# Patient Record
Sex: FEMALE | Race: BLACK OR AFRICAN AMERICAN | Employment: FULL TIME | URBAN - METROPOLITAN AREA
[De-identification: names, ages, dates, MRNs, and addresses within clinical notes are randomized per-mention and may not be internally consistent; named-entity substitution may affect disease eponyms.]

---

## 2017-02-21 ENCOUNTER — TELEPHONE (OUTPATIENT)
Dept: FAMILY MEDICINE CLINIC | Age: 27
End: 2017-02-21

## 2017-02-21 NOTE — TELEPHONE ENCOUNTER
----- Message from Yash Jordan sent at 2/21/2017 10:50 AM EST -----  Regarding: Dr. Adriana Alfaro  H/C [phone]    Pt would like a call back with an appt for CPE and f/up Vitamin D with only Dr. Vanessa Mcfarland.

## 2017-02-21 NOTE — TELEPHONE ENCOUNTER
Returned call. Appt made and also appt tickler for April schedule of Dr. Urmila Baltazar.     Friday, March 3, 2017 04:15 PM f [facility]-MAIN OFFICE, [facility], Routine Care, 15min    f/up Vitamin D

## 2017-03-03 ENCOUNTER — OFFICE VISIT (OUTPATIENT)
Dept: FAMILY MEDICINE CLINIC | Age: 27
End: 2017-03-03

## 2017-03-03 VITALS
HEART RATE: 80 BPM | WEIGHT: 140 LBS | BODY MASS INDEX: 24.8 KG/M2 | DIASTOLIC BLOOD PRESSURE: 85 MMHG | SYSTOLIC BLOOD PRESSURE: 140 MMHG | RESPIRATION RATE: 16 BRPM | HEIGHT: 63 IN | OXYGEN SATURATION: 100 % | TEMPERATURE: 98.5 F

## 2017-03-03 DIAGNOSIS — E55.9 VITAMIN D DEFICIENCY: Primary | ICD-10-CM

## 2017-03-03 NOTE — PROGRESS NOTES
Chief Complaint   Patient presents with    Vitamin D Deficiency     follow up     1. Have you been to the ER, urgent care clinic since your last visit? Hospitalized since your last visit? No    2. Have you seen or consulted any other health care providers outside of the [address] [place] [address] since your last visit? Include any pap smears or colon screening.  No

## 2017-03-03 NOTE — PATIENT INSTRUCTIONS
Vitamin D (By mouth)   Vitamin D (VYE-ta-min D)  Maintains calcium and phosphorus in your body and makes your bones strong. This medicine is a vitamin. Brand Name(s):Verdugo Vitamin D, Verdugo Vitamin D3, Delta D3, Dialyvite Vitamin D3 Max, Drisdol, Leader Vitamin D3, Francheska Dutta , Ang Natural Vitamin D, Nature's Blend Super Strength Vitamin D3, Nature's Blend Vitamin D, Nature's Blend Vitamin D3, PharmAssure Vitamin D-3, R[employer] Aid Vitamin D-3, [place] Naturals Vitamin D3, Thera-D 2000   There may be other brand names for this medicine. When This Medicine Should Not Be Used: You should not use this medicine if you have had an allergic reaction to vitamin D. How to Use This Medicine:   Tablet, Liquid Filled Capsule  · Your doctor will tell you how much medicine to use. Do not use more than directed. · Follow the instructions on the medicine label if you are using this medicine without a prescription. · It is best to take this medicine with food or milk. · Carefully follow your doctor's instructions about any special diet. If a dose is missed:   · Take a dose as soon as you remember. If it is almost time for your next dose, wait until then and take a regular dose. Do not take extra medicine to make up for a missed dose. How to Store and Dispose of This Medicine:   · Store the medicine in a closed container at room temperature, away from heat, moisture, and direct light. · Ask your pharmacist, doctor, or health caregiver about the best way to dispose of any outdated medicine or medicine no longer needed. · Keep all medicine out of the reach of children. Never share your medicine with anyone. Drugs and Foods to Avoid:   Ask your doctor or pharmacist before using any other medicine, including over-the-counter medicines, vitamins, and herbal products. · Make sure your doctor knows about all other medicines you are using.   Warnings While Using This Medicine:   · Make sure your doctor knows if you are pregnant or breast feeding. · Make sure your doctor knows if you have kidney stones, sarcoidosis, or overactive parathyroid gland. · You should not use this medicine if you are under 25years of age. Possible Side Effects While Using This Medicine:   Call your doctor right away if you notice any of these side effects:  · Allergic reaction: Itching or hives, swelling in your face or hands, swelling or tingling in your mouth or throat, chest tightness, trouble breathing  · Change in how much or how often you urinate. If you notice these less serious side effects, talk with your doctor:   · Diarrhea. · Headache. · Weight loss. If you notice other side effects that you think are caused by this medicine, tell your doctor. Call your doctor for medical advice about side effects. You may report side effects to FDA at 2-028-FDA-1139  © 2016 [address] is for End User's use only and may not be sold, redistributed or otherwise used for commercial purposes. The above information is an  only. It is not intended as medical advice for individual conditions or treatments. Talk to your doctor, nurse or pharmacist before following any medical regimen to see if it is safe and effective for you. Learning About Vitamin D  Why is it important to get enough vitamin D? Your body needs vitamin D to absorb calcium. Calcium keeps your bones and muscles, including your heart, healthy and strong. If your muscles don't get enough calcium, they can cramp, hurt, or feel weak. You may have long-term (chronic) muscle aches and pains. If you don't get enough vitamin D throughout life, you have an increased chance of having thin and brittle bones (osteoporosis) in your later years. Children who don't get enough vitamin D may not grow as much as others their age. They also have a chance of getting a rare disease called rickets. It causes weak bones.   Vitamin D and calcium are added to many foods. And your body uses sunshine to make its own vitamin D. How much vitamin D do you need? The [place] of [employer] recommends that people ages 3 through 79 get 600 IU (international units) every day. Adults 71 and older need 800 IU every day. Blood tests for vitamin D can check your vitamin D level. But there is no standard normal range used by all laboratories. The [place] of [employer] recommends a blood level of 20 ng/mL of vitamin D for healthy bones. And most people in the [place] and [facility]) meet this goal.  How can you get more vitamin D? Foods that contain vitamin D include:  · [place], tuna, and mackerel. These are some of the best foods to eat when you need to get more vitamin D.  · Cheese, egg yolks, and beef liver. These foods have vitamin D in small amounts. · Milk, soy drinks, orange juice, yogurt, margarine, and some kinds of cereal have vitamin D added to them. Some people don't make vitamin D as well as others. They may have to take extra care in getting enough vitamin D. Things that reduce how much vitamin D your body makes include:  · Dark skin, such as many  Americans have. · Age, especially if you are older than 72. · Digestive problems, such as Crohn's or celiac disease. · Liver and kidney disease. Some people who do not get enough vitamin D may need supplements. Are there any risks from taking vitamin D?  · Too much vitamin D:  ¨ Can damage your kidneys. ¨ Can cause nausea and vomiting, constipation, and weakness. ¨ Raises the amount of calcium in your blood. If this happens, you can get confused or have an irregular heart rhythm. · Vitamin D may interact with other medicines. Tell your doctor about all of the medicines you take, including over-the-counter drugs, herbs, and pills. Tell your doctor about all of your current medical problems. Where can you learn more? Go to http://marilee-greta.info/.   Enter [id number] in the search box to learn more about \"Learning About Vitamin D.\"  Current as of: July 26, 2016  Content Version: 11.1  © 1844-1850 [employer]. Care instructions adapted under license by [employer] (which disclaims liability or warranty for this information). If you have questions about a medical condition or this instruction, always ask your healthcare professional. [place] [address] any warranty or liability for your use of this information.

## 2017-03-04 LAB — 25(OH)D3+25(OH)D2 SERPL-MCNC: 44.6 NG/ML (ref 30–100)

## 2017-04-03 ENCOUNTER — OFFICE VISIT (OUTPATIENT)
Dept: FAMILY MEDICINE CLINIC | Age: 27
End: 2017-04-03

## 2017-04-03 ENCOUNTER — HOSPITAL ENCOUNTER (OUTPATIENT)
Dept: LAB | Age: 27
Discharge: HOME | End: 2017-04-03
Payer: COMMERCIAL

## 2017-04-03 VITALS
OXYGEN SATURATION: 100 % | DIASTOLIC BLOOD PRESSURE: 81 MMHG | RESPIRATION RATE: 17 BRPM | BODY MASS INDEX: 25.16 KG/M2 | SYSTOLIC BLOOD PRESSURE: 137 MMHG | HEART RATE: 77 BPM | WEIGHT: 142 LBS | HEIGHT: 63 IN | TEMPERATURE: 98.4 F

## 2017-04-03 DIAGNOSIS — Z11.3 SCREENING FOR STD (SEXUALLY TRANSMITTED DISEASE): ICD-10-CM

## 2017-04-03 DIAGNOSIS — Z01.419 WELL WOMAN EXAM WITH ROUTINE GYNECOLOGICAL EXAM: Primary | ICD-10-CM

## 2017-04-03 PROCEDURE — 88175 CYTOPATH C/V AUTO FLUID REDO: CPT | Performed by: FAMILY MEDICINE

## 2017-04-03 NOTE — PROGRESS NOTES
Subjective:   27 y.o. female for Well Woman Check. No LMP recorded. Patient has had an implant. implanon due to be removed in Dec.   Currently with irregular menses. She also notes some breast tenderness. Unsure of contraception. She is currently in a monogamous relationship. Considering future pregnancy. Social History: single partner, contraception - Implanon. Pertinent past medical hstory: no history of HTN, DVT, CAD, DM, liver disease, migraines or smoking. Patient Active Problem List   Diagnosis Code    Vitamin D deficiency E55.9    IM (infectious mononucleosis) B27.90     Patient Active Problem List    Diagnosis Date Noted    IM (infectious mononucleosis) 01/17/2013    Vitamin D deficiency 01/09/2013     Current Outpatient Prescriptions   Medication Sig Dispense Refill    cholecalciferol (VITAMIN D3) 1,000 unit tablet Take 1 Tab by mouth daily. 90 Tab 4     Allergies   Allergen Reactions    Sulfa (Sulfonamide Antibiotics) Hives     Past Medical History:   Diagnosis Date    Headache      Past Surgical History:   Procedure Laterality Date    HX WISDOM TEETH EXTRACTION  08/14/2015     Social History   Substance Use Topics    Smoking status: Never Smoker    Smokeless tobacco: Never Used    Alcohol use Yes      Comment: occasionally        ROS:  Feeling well. No dyspnea or chest pain on exertion. No abdominal pain, change in bowel habits, black or bloody stools. No urinary tract symptoms. GYN ROS: no breast pain or new or enlarging lumps on self exam. No neurological complaints. Objective:     Visit Vitals    /81    Pulse 77    Temp 98.4 °F (36.9 °C) (Oral)    Resp 17    Ht 5' 3\" (1.6 m)    Wt 142 lb (64.4 kg)    SpO2 100%    BMI 25.15 kg/m2     The patient appears well, alert, oriented x 3, in no distress. ENT normal.  Neck supple. No adenopathy or thyromegaly. CURT. Lungs are clear, good air entry, no wheezes, rhonchi or rales.  S1 and S2 normal, no murmurs, regular rate and rhythm. Abdomen soft without tenderness, guarding, mass or organomegaly. Extremities show no edema, normal peripheral pulses. Neurological is normal, no focal findings. BREAST EXAM: not examined    PELVIC EXAM: normal external genitalia, vulva, vagina, cervix, uterus and adnexa, PAP: Pap smear done today, exam chaperoned by LPN, scant bleeding present at the cervical os. Assessment/Plan:   well woman  no contraindication to continue hormonal therapy  pap smear  additional lab tests per orders  return annually or prn    ICD-10-CM ICD-9-CM    1. Well woman exam with routine gynecological exam Z01.419 V72.31 CBC W/O DIFF      LIPID PANEL      METABOLIC PANEL, COMPREHENSIVE      TSH 3RD GENERATION      PAP, IG, RFX HPV ASCUS ([id number])   .

## 2017-04-03 NOTE — PROGRESS NOTES
Chief Complaint   Patient presents with    Well Woman     w/pap     1. Have you been to the ER, urgent care clinic since your last visit? Hospitalized since your last visit? No    2. Have you seen or consulted any other health care providers outside of the [address] [place] [address] since your last visit? Include any pap smears or colon screening.  No

## 2017-04-03 NOTE — PATIENT INSTRUCTIONS
Well Visit, Ages 25 to 48: Care Instructions  Your Care Instructions  Physical exams can help you stay healthy. Your doctor has checked your overall health and may have suggested ways to take good care of yourself. He or she also may have recommended tests. At home, you can help prevent illness with healthy eating, regular exercise, and other steps. Follow-up care is a key part of your treatment and safety. Be sure to make and go to all appointments, and call your doctor if you are having problems. It's also a good idea to know your test results and keep a list of the medicines you take. How can you care for yourself at home? · Reach and stay at a healthy weight. This will lower your risk for many problems, such as obesity, diabetes, heart disease, and high blood pressure. · Get at least 30 minutes of physical activity on most days of the week. Walking is a good choice. You also may want to do other activities, such as running, swimming, cycling, or playing tennis or team sports. Discuss any changes in your exercise program with your doctor. · Do not smoke or allow others to smoke around you. If you need help quitting, talk to your doctor about stop-smoking programs and medicines. These can increase your chances of quitting for good. · Talk to your doctor about whether you have any risk factors for sexually transmitted infections (STIs). Having one sex partner (who does not have STIs and does not have sex with anyone else) is a good way to avoid these infections. · Use birth control if you do not want to have children at this time. Talk with your doctor about the choices available and what might be best for you. · Protect your skin from too much sun. When you're outdoors from 10 a.m. to 4 p.m., stay in the shade or cover up with clothing and a hat with a wide brim. Wear sunglasses that block UV rays. Even when it's cloudy, put broad-spectrum sunscreen (SPF 30 or higher) on any exposed skin.   · See a dentist one or two times a year for checkups and to have your teeth cleaned. · Wear a seat belt in the car. · Drink alcohol in moderation, if at all. That means no more than 2 drinks a day for men and 1 drink a day for women. Follow your doctor's advice about when to have certain tests. These tests can spot problems early. For everyone  · Cholesterol. Have the fat (cholesterol) in your blood tested after age 21. Your doctor will tell you how often to have this done based on your age, family history, or other things that can increase your risk for heart disease. · Blood pressure. Have your blood pressure checked during a routine doctor visit. Your doctor will tell you how often to check your blood pressure based on your age, your blood pressure results, and other factors. · Vision. Talk with your doctor about how often to have a glaucoma test.  · Diabetes. Ask your doctor whether you should have tests for diabetes. · Colon cancer. Have a test for colon cancer at age 48. You may have one of several tests. If you are younger than 48, you may need a test earlier if you have any risk factors. Risk factors include whether you already had a precancerous polyp removed from your colon or whether your parent, brother, sister, or child has had colon cancer. For women  · Breast exam and mammogram. Talk to your doctor about when you should have a clinical breast exam and a mammogram. Medical experts differ on whether and how often women under 50 should have these tests. Your doctor can help you decide what is right for you. · Pap test and pelvic exam. Begin Pap tests at age 24. A Pap test is the best way to find cervical cancer. The test often is part of a pelvic exam. Ask how often to have this test.  · Tests for sexually transmitted infections (STIs). Ask whether you should have tests for STIs. You may be at risk if you have sex with more than one person, especially if your partners do not wear condoms.   For men  · Tests for sexually transmitted infections (STIs). Ask whether you should have tests for STIs. You may be at risk if you have sex with more than one person, especially if you do not wear a condom. · Testicular cancer exam. Ask your doctor whether you should check your testicles regularly. · Prostate exam. Talk to your doctor about whether you should have a blood test (called a PSA test) for prostate cancer. Experts differ on whether and when men should have this test. Some experts suggest it if you are older than 39 and are -American or have a father or brother who got prostate cancer when he was younger than 72. When should you call for help? Watch closely for changes in your health, and be sure to contact your doctor if you have any problems or symptoms that concern you. Where can you learn more? Go to http://marilee-greta.info/. Enter P072 in the search box to learn more about \"Well Visit, Ages 25 to 48: Care Instructions. \"  Current as of: July 19, 2016  Content Version: 11.2  © 8056-1190 [employer] Care instructions adapted under license by [employer] (which disclaims liability or warranty for this information). If you have questions about a medical condition or this instruction, always ask your healthcare professional. [place] [address] any warranty or liability for your use of this information. Learning About Natural Family Planning  What is natural family planning? Natural family planning is a way to find out which days of the month you are most likely to get pregnant. To prevent pregnancy, you do not have sex on those days. If you want to get pregnant, you have sex during those days. But a woman may use natural family planning and still not get the results she wants. This method may not work well for you if your periods are not regular.  It also may not work well if you have problems keeping track of your periods or taking your temperature at the same time each day. How well does it work as birth control? Family planning takes a lot of effort. You must be very aware of your body. And you must track many things closely. It can be very hard to do \"exactly as directed. \" This type of family planning does not work better than other birth control methods. In the first year of use:  · When natural family planning is used exactly as directed, 5 women out of 100 have an unplanned pregnancy. · When it is not used exactly as directed, 24 women out of 100 have an unplanned pregnancy. How do you find out when you are likely to get pregnant? A woman who has regular periods has about 5 to 9 fertile days each month. These are the days when she can get pregnant. To find out when you are fertile, you must know when you release an egg (ovulate). There are several ways to find out when you are fertile. To get the result you want, you may need to use some of these methods at the same time. You should check your body changes using these methods for several months before you use them to avoid pregnancy. · In the calendar, or rhythm method, you write down when you start your period. This tells you how long your cycle is. It also tells you how regular it is. With this information, you can guess which days of the month you are most likely to be fertile. This is between 9 and 17 days before your next period. This method works best if you have regular cycles. · In the basal body temperature (BBT) method, you take your temperature first thing in the morning every day. This gives you your BBT. This is your lowest temperature during the day. Your BBT goes down 1 to 2 days before ovulation. Then it goes back up 1 to 2 days after you ovulate. If you use care to track your BBT, you may be able to guess when you are fertile. · In the cervical mucus (Angus) method, you check the mucus in your vagina every day.  You write down the amount, stickiness, and color of the mucus. The mucus changes during your menstrual cycle. If you track it, you may be able to guess when you are fertile. · In hormonal monitoring, you buy a kit that lets you check the amount of a hormone in your urine. The amount of the hormone tells you if you may be ovulating. · In the combined (symptothermal) method, you use your basal body temperature, changes in your cervical mucus, and a hormone test to guess when you ovulate. You also watch for signs of ovulation. These include tender breasts, belly pain, and mood changes. Be sure to tell your doctor about any health problems you have or medicines you take. He or she can help you choose the birth control method that is right for you. What are the advantages of natural family planning? · It may fit well with your Roman Catholic or personal beliefs about birth control. · You are able to use a \"natural\" method of birth control. It doesn't use medicines, surgery, or other devices. · You are aware of your cycle and the changes it causes in your body. What are the disadvantages of natural family planning? · It does not always work very well. · It doesn't protect against sexually transmitted infections (STIs), such as herpes or HIV. If you're not sure if your sex partner might have an STI, use a condom to protect against disease. · It may not work well when you have a lot of stress, have just had a baby, or stop taking birth control pills. It also may not work well just before menopause. · You must pay attention to body changes and record all details. · You can't use the method until you've tracked body changes for a few months. Where can you learn more? Go to http://marilee-greta.info/. Enter A452 in the search box to learn more about \"Learning About [facility]. \"  Current as of: May 30, 2016  Content Version: 11.2  © 8328-6543 [employer].  Care instructions adapted under license by [employer] (which disclaims liability or warranty for this information). If you have questions about a medical condition or this instruction, always ask your healthcare professional. [address] any warranty or liability for your use of this information.

## 2017-04-03 NOTE — MR AVS SNAPSHOT
Visit Information Date & Time Provider Department Dept. Phone Encounter #  
 4/3/2017  9:00 AM Felipa Potter, [facility] [phone] [id number] Follow-up Instructions Return in about 1 year (around 4/3/2018), or if symptoms worsen or fail to improve. Upcoming Health Maintenance Date Due  
 PAP AKA CERVICAL CYTOLOGY 7/16/2016 DTaP/Tdap/Td series (6 - Td) 6/27/2022 Allergies as of 4/3/2017  Review Complete On: 4/3/2017 By: Felipa Potter MD  
  
 Severity Noted Reaction Type Reactions Sulfa (Sulfonamide Antibiotics)  07/09/2012    Hives Current Immunizations  Reviewed on 12/15/2015 Name Date DTAP Vaccine 9/13/1992, 3/28/1990, [birth year] HPV (Quad) 6/6/2014, 8/19/2013 Hep A Vaccine (Adult) 6/6/2014, 8/19/2013 Hepatitis B Vaccine 1/17/2013, 11/29/2012, 6/27/2012 IPV 9/13/1992, 3/28/1990, 3/8/1989, 1990, 1990 Influenza Vaccine 10/3/2016, 10/12/2013, 1/8/2013 MMR Vaccine 9/13/1992, 11/30/1989 TB Skin Test (PPD) Intradermal 12/15/2015 11:59 AM, 6/19/2013 11:33 AM  
 TDAP Vaccine 6/27/2012 Not reviewed this visit You Were Diagnosed With   
  
 Codes Comments Well woman exam with routine gynecological exam    -  Primary ICD-10-CM: X01.617 ICD-9-CM: V72.31 Screening for STD (sexually transmitted disease)     ICD-10-CM: Z11.3 ICD-9-CM: V74.5 Vitals BP Pulse Temp Resp Height(growth percentile) Weight(growth percentile) 137/81 77 98.4 °F (36.9 °C) (Oral) 17 5' 3\" (1.6 m) 142 lb (64.4 kg) SpO2 BMI OB Status Smoking Status 100% 25.15 kg/m2 Implant Never Smoker BMI and BSA Data Body Mass Index Body Surface Area  
 25.15 kg/m 2 1.69 m 2 Preferred Pharmacy Pharmacy Name Phone [facility] [address] [facility] [place] [phone] Your Updated Medication List  
  
   
 This list is accurate as of: 4/3/17  9:36 AM.  Always use your most recent med list.  
  
  
  
  
 cholecalciferol 1,000 unit tablet Commonly known as:  VITAMIN D3 Take 1 Tab by mouth daily. We Performed the Following CBC W/O DIFF [09850 CPT(R)] LIPID PANEL [34519 CPT(R)] METABOLIC PANEL, COMPREHENSIVE [66563 CPT(R)] [address] [id number] Custom] PAP, IG, RFX HPV ASCUS (654020) [05288 CPT(R)] TSH 3RD GENERATION [45268 CPT(R)] Follow-up Instructions Return in about 1 year (around 4/3/2018), or if symptoms worsen or fail to improve. Patient Instructions Well Visit, Ages 25 to 48: Care Instructions Your Care Instructions Physical exams can help you stay healthy. Your doctor has checked your overall health and may have suggested ways to take good care of yourself. He or she also may have recommended tests. At home, you can help prevent illness with healthy eating, regular exercise, and other steps. Follow-up care is a key part of your treatment and safety. Be sure to make and go to all appointments, and call your doctor if you are having problems. It's also a good idea to know your test results and keep a list of the medicines you take. How can you care for yourself at home? · Reach and stay at a healthy weight. This will lower your risk for many problems, such as obesity, diabetes, heart disease, and high blood pressure. · Get at least 30 minutes of physical activity on most days of the week. Walking is a good choice. You also may want to do other activities, such as running, swimming, cycling, or playing tennis or team sports. Discuss any changes in your exercise program with your doctor. · Do not smoke or allow others to smoke around you. If you need help quitting, talk to your doctor about stop-smoking programs and medicines. These can increase your chances of quitting for good. · Talk to your doctor about whether you have any risk factors for sexually transmitted infections (STIs). Having one sex partner (who does not have STIs and does not have sex with anyone else) is a good way to avoid these infections. · Use birth control if you do not want to have children at this time. Talk with your doctor about the choices available and what might be best for you. · Protect your skin from too much sun. When you're outdoors from 10 a.m. to 4 p.m., stay in the shade or cover up with clothing and a hat with a wide brim. Wear sunglasses that block UV rays. Even when it's cloudy, put broad-spectrum sunscreen (SPF 30 or higher) on any exposed skin. · See a dentist one or two times a year for checkups and to have your teeth cleaned. · Wear a seat belt in the car. · Drink alcohol in moderation, if at all. That means no more than 2 drinks a day for men and 1 drink a day for women. Follow your doctor's advice about when to have certain tests. These tests can spot problems early. For everyone · Cholesterol. Have the fat (cholesterol) in your blood tested after age 21. Your doctor will tell you how often to have this done based on your age, family history, or other things that can increase your risk for heart disease. · Blood pressure. Have your blood pressure checked during a routine doctor visit. Your doctor will tell you how often to check your blood pressure based on your age, your blood pressure results, and other factors. · Vision. Talk with your doctor about how often to have a glaucoma test. 
· Diabetes. Ask your doctor whether you should have tests for diabetes. · Colon cancer. Have a test for colon cancer at age 48. You may have one of several tests. If you are younger than 48, you may need a test earlier if you have any risk factors.  Risk factors include whether you already had a precancerous polyp removed from your colon or whether your parent, brother, sister, or child has had colon cancer. For women · Breast exam and mammogram. Talk to your doctor about when you should have a clinical breast exam and a mammogram. Medical experts differ on whether and how often women under 50 should have these tests. Your doctor can help you decide what is right for you. · Pap test and pelvic exam. Begin Pap tests at age 24. A Pap test is the best way to find cervical cancer. The test often is part of a pelvic exam. Ask how often to have this test. 
· Tests for sexually transmitted infections (STIs). Ask whether you should have tests for STIs. You may be at risk if you have sex with more than one person, especially if your partners do not wear condoms. For men · Tests for sexually transmitted infections (STIs). Ask whether you should have tests for STIs. You may be at risk if you have sex with more than one person, especially if you do not wear a condom. · Testicular cancer exam. Ask your doctor whether you should check your testicles regularly. · Prostate exam. Talk to your doctor about whether you should have a blood test (called a PSA test) for prostate cancer. Experts differ on whether and when men should have this test. Some experts suggest it if you are older than 39 and are -American or have a father or brother who got prostate cancer when he was younger than 72. When should you call for help? Watch closely for changes in your health, and be sure to contact your doctor if you have any problems or symptoms that concern you. Where can you learn more? Go to http://marilee-greta.info/. Enter P072 in the search box to learn more about \"Well Visit, Ages 25 to 48: Care Instructions. \" Current as of: July 19, 2016 Content Version: 11.2 © 1672-2069 [employer].  Care instructions adapted under license by [employer] (which disclaims liability or warranty for this information). If you have questions about a medical condition or this instruction, always ask your healthcare professional. [address] any warranty or liability for your use of this information. Learning About [facility] What is natural family planning? Natural family planning is a way to find out which days of the month you are most likely to get pregnant. To prevent pregnancy, you do not have sex on those days. If you want to get pregnant, you have sex during those days. But a woman may use natural family planning and still not get the results she wants. This method may not work well for you if your periods are not regular. It also may not work well if you have problems keeping track of your periods or taking your temperature at the same time each day. How well does it work as birth control? Family planning takes a lot of effort. You must be very aware of your body. And you must track many things closely. It can be very hard to do \"exactly as directed. \" This type of family planning does not work better than other birth control methods. In the first year of use: · When natural family planning is used exactly as directed, 5 women out of 100 have an unplanned pregnancy. · When it is not used exactly as directed, 24 women out of 100 have an unplanned pregnancy. How do you find out when you are likely to get pregnant? A woman who has regular periods has about 5 to 9 fertile days each month. These are the days when she can get pregnant. To find out when you are fertile, you must know when you release an egg (ovulate). There are several ways to find out when you are fertile. To get the result you want, you may need to use some of these methods at the same time. You should check your body changes using these methods for several months before you use them to avoid pregnancy.  
· In the calendar, or rhythm method, you write down when you start your period. This tells you how long your cycle is. It also tells you how regular it is. With this information, you can guess which days of the month you are most likely to be fertile. This is between 9 and 17 days before your next period. This method works best if you have regular cycles. · In the basal body temperature (BBT) method, you take your temperature first thing in the morning every day. This gives you your BBT. This is your lowest temperature during the day. Your BBT goes down 1 to 2 days before ovulation. Then it goes back up 1 to 2 days after you ovulate. If you use care to track your BBT, you may be able to guess when you are fertile. · In the cervical mucus (Angus) method, you check the mucus in your vagina every day. You write down the amount, stickiness, and color of the mucus. The mucus changes during your menstrual cycle. If you track it, you may be able to guess when you are fertile. · In hormonal monitoring, you buy a kit that lets you check the amount of a hormone in your urine. The amount of the hormone tells you if you may be ovulating. · In the combined (symptothermal) method, you use your basal body temperature, changes in your cervical mucus, and a hormone test to guess when you ovulate. You also watch for signs of ovulation. These include tender breasts, belly pain, and mood changes. Be sure to tell your doctor about any health problems you have or medicines you take. He or she can help you choose the birth control method that is right for you. What are the advantages of natural family planning? · It may fit well with your Yarsani or personal beliefs about birth control. · You are able to use a \"natural\" method of birth control. It doesn't use medicines, surgery, or other devices. · You are aware of your cycle and the changes it causes in your body. What are the disadvantages of natural family planning? · It does not always work very well. · It doesn't protect against sexually transmitted infections (STIs), such as herpes or HIV. If you're not sure if your sex partner might have an STI, use a condom to protect against disease. · It may not work well when you have a lot of stress, have just had a baby, or stop taking birth control pills. It also may not work well just before menopause. · You must pay attention to body changes and record all details. · You can't use the method until you've tracked body changes for a few months. Where can you learn more? Go to http://marilee-greta.info/. Enter A452 in the search box to learn more about \"Learning About [facility]. \" Current as of: May 30, 2016 Content Version: 11.2 © 3217-9140 [employer]. Care instructions adapted under license by [employer] (which disclaims liability or warranty for this information). If you have questions about a medical condition or this instruction, always ask your healthcare professional. [address] any warranty or liability for your use of this information. Introducing \[facility]\"" & HEALTH SERVICES! Dear Pratik Chang: Thank you for requesting a Tyba account. Our records indicate that you already have an active Tyba account. You can access your account anytime at [email or web address] Did you know that you can access your hospital and ER discharge instructions at any time in Tyba? You can also review all of your test results from your hospital stay or ER visit. Additional Information If you have questions, please visit the Frequently Asked Questions section of the Tyba website at [email or web address] Remember, Tyba is NOT to be used for urgent needs. For medical emergencies, dial 911. Now available from your iPhone and Android! Please provide this summary of care documentation to your next provider. Your primary care clinician is listed as Bess Alexander. If you have any questions after today's visit, please call [phone].

## 2017-04-03 NOTE — LETTER
4/7/2017 8:13 AM 
 
Ms. Arturo Rivera [id number] Dear Jimmy Keita: Please find your most recent results below. Resulted Orders CX-VAG CYTOLOGY Narrative Anette [address]  [facility]      [address]         [address] [facility], [facility] [address]      [place], [address]     [place]a [place], [address] [facility] 
[phone] Fax# [phone]    Fax# ([phone]      Fax# [phone] 
 
========================================================================== 
                       * * * CYTOPATHOLOGY REPORT* * *   
========================================================================== 
GYNECOLOGICAL Patient:  She Colin             Specimen #:  [id number] Age:  1990 (Age: 27)                Date of Procedure: 4/3/2017 Sex:  F                                  Date of Receipt:  4/4/2017 Hospital#:  [id number]               Date of Report: 4/5/2017 Med. Record #:  [id number] Location:  [facility]) Physician(s):  Aden Witt MD ([id number]) SOURCE: 
A: Cervical/Endocervical Imaged Processed Thin Prep 
 
 
============================================================================ 
                                * * * FINAL INTERPRETATION* * * Cervical/Endocervical Imaged Processed Thin Prep Satisfactory for evaluation. NEGATIVE FOR INTRAEPITHELIAL LESION OR MALIGNANCY. Mendy Urias ([facility]) ICD10 Codes: 
 Z00.564 The Pap test is a screening procedure to aid in the detection of cervical 
cancer and its precursors.   It should not be used as the sole means of 
 detecting cervical cancer. As with any laboratory test, false negative 
and false positive results are known to occur. RECOMMENDATIONS: 
Your pap smear is normal. Please return in one year for your annual exam.  
Your next pap smear is due in 3 yrs. Please call me if you have any questions: [phone] Sincerely, [facility] OUTREACH INSURANCE

## 2017-04-04 LAB
ALBUMIN SERPL-MCNC: 4.5 G/DL (ref 3.5–5.5)
ALBUMIN/GLOB SERPL: 1.7 {RATIO} (ref 1.2–2.2)
ALP SERPL-CCNC: 53 IU/L (ref 39–117)
ALT SERPL-CCNC: 15 IU/L (ref 0–32)
AST SERPL-CCNC: 15 IU/L (ref 0–40)
BILIRUB SERPL-MCNC: 0.7 MG/DL (ref 0–1.2)
BUN SERPL-MCNC: 9 MG/DL (ref 6–20)
BUN/CREAT SERPL: 13 (ref 9–23)
CALCIUM SERPL-MCNC: 9.2 MG/DL (ref 8.7–10.2)
CHLORIDE SERPL-SCNC: 105 MMOL/L (ref 96–106)
CHOLEST SERPL-MCNC: 167 MG/DL (ref 100–199)
CO2 SERPL-SCNC: 23 MMOL/L (ref 18–29)
CREAT SERPL-MCNC: 0.72 MG/DL (ref 0.57–1)
ERYTHROCYTE [DISTWIDTH] IN BLOOD BY AUTOMATED COUNT: 13.5 % (ref 12.3–15.4)
GLOBULIN SER CALC-MCNC: 2.7 G/DL (ref 1.5–4.5)
GLUCOSE SERPL-MCNC: 93 MG/DL (ref 65–99)
HCT VFR BLD AUTO: 43.6 % (ref 34–46.6)
HDLC SERPL-MCNC: 56 MG/DL
HGB BLD-MCNC: 14.6 G/DL (ref 11.1–15.9)
INTERPRETATION, 910389: NORMAL
LDLC SERPL CALC-MCNC: 103 MG/DL (ref 0–99)
MCH RBC QN AUTO: 29.6 PG (ref 26.6–33)
MCHC RBC AUTO-ENTMCNC: 33.5 G/DL (ref 31.5–35.7)
MCV RBC AUTO: 88 FL (ref 79–97)
PLATELET # BLD AUTO: 389 X10E3/UL (ref 150–379)
POTASSIUM SERPL-SCNC: 4.2 MMOL/L (ref 3.5–5.2)
PROT SERPL-MCNC: 7.2 G/DL (ref 6–8.5)
RBC # BLD AUTO: 4.94 X10E6/UL (ref 3.77–5.28)
SODIUM SERPL-SCNC: 144 MMOL/L (ref 134–144)
TRIGL SERPL-MCNC: 39 MG/DL (ref 0–149)
TSH SERPL DL<=0.005 MIU/L-ACNC: 0.36 UIU/ML (ref 0.45–4.5)
VLDLC SERPL CALC-MCNC: 8 MG/DL (ref 5–40)
WBC # BLD AUTO: 5.2 X10E3/UL (ref 3.4–10.8)

## 2017-04-06 LAB
A VAGINAE DNA VAG QL NAA+PROBE: NORMAL SCORE
BVAB2 DNA VAG QL NAA+PROBE: NORMAL SCORE
C ALBICANS DNA VAG QL NAA+PROBE: NEGATIVE
C GLABRATA DNA VAG QL NAA+PROBE: NEGATIVE
C TRACH RRNA SPEC QL NAA+PROBE: NEGATIVE
MEGA1 DNA VAG QL NAA+PROBE: NORMAL SCORE
N GONORRHOEA RRNA SPEC QL NAA+PROBE: NEGATIVE
T VAGINALIS RRNA SPEC QL NAA+PROBE: NEGATIVE

## 2017-06-02 ENCOUNTER — OFFICE VISIT (OUTPATIENT)
Dept: FAMILY MEDICINE CLINIC | Age: 27
End: 2017-06-02

## 2017-06-02 VITALS
BODY MASS INDEX: 24.8 KG/M2 | RESPIRATION RATE: 16 BRPM | HEART RATE: 90 BPM | OXYGEN SATURATION: 97 % | SYSTOLIC BLOOD PRESSURE: 132 MMHG | HEIGHT: 63 IN | TEMPERATURE: 98.2 F | DIASTOLIC BLOOD PRESSURE: 72 MMHG | WEIGHT: 140 LBS

## 2017-06-02 DIAGNOSIS — B37.31 YEAST VAGINITIS: ICD-10-CM

## 2017-06-02 DIAGNOSIS — B96.89 BACTERIAL VAGINOSIS: ICD-10-CM

## 2017-06-02 DIAGNOSIS — N76.0 BACTERIAL VAGINOSIS: ICD-10-CM

## 2017-06-02 DIAGNOSIS — R10.30 LOWER ABDOMINAL PAIN: Primary | ICD-10-CM

## 2017-06-02 LAB
BILIRUB UR QL STRIP: NEGATIVE
GLUCOSE UR-MCNC: NEGATIVE MG/DL
HCG URINE, QL. (POC): NEGATIVE
KETONES P FAST UR STRIP-MCNC: NEGATIVE MG/DL
PH UR STRIP: 6.5 [PH] (ref 4.6–8)
PROT UR QL STRIP: NEGATIVE MG/DL
SP GR UR STRIP: 1.01 (ref 1–1.03)
UA UROBILINOGEN AMB POC: NORMAL (ref 0.2–1)
URINALYSIS CLARITY POC: CLEAR
URINALYSIS COLOR POC: YELLOW
URINE BLOOD POC: NORMAL
URINE LEUKOCYTES POC: NEGATIVE
URINE NITRITES POC: NEGATIVE
VALID INTERNAL CONTROL?: YES

## 2017-06-02 RX ORDER — METRONIDAZOLE 500 MG/1
500 TABLET ORAL 2 TIMES DAILY
Qty: 14 TAB | Refills: 0 | Status: SHIPPED | OUTPATIENT
Start: 2017-06-02 | End: 2017-06-09

## 2017-06-02 RX ORDER — IBUPROFEN 800 MG/1
800 TABLET ORAL
Qty: 30 TAB | Refills: 0 | Status: SHIPPED | OUTPATIENT
Start: 2017-06-02

## 2017-06-02 RX ORDER — FLUCONAZOLE 150 MG/1
150 TABLET ORAL DAILY
Qty: 1 TAB | Refills: 0 | Status: SHIPPED | OUTPATIENT
Start: 2017-06-02 | End: 2017-06-03

## 2017-06-02 NOTE — PROGRESS NOTES
Chief Complaint   Patient presents with    Abdominal Pain     lower abdominal pain . ..started Monday throbbing,achy pain. . pt states constant lower abdominal pain. pt rates pain as a 7 on pain scale     1. Have you been to the ER, urgent care clinic since your last visit? Hospitalized since your last visit? No    2. Have you seen or consulted any other health care providers outside of the [address] [place] [address] since your last visit? Include any pap smears or colon screening.  No

## 2017-06-02 NOTE — MR AVS SNAPSHOT
Visit Information Date & Time Provider Department Dept. Phone Encounter #  
 6/2/2017 10:45 AM Cornelio Amezcua MD [address] [place] [address] [phone] [id number] Follow-up Instructions Return if symptoms worsen or fail to improve. Upcoming Health Maintenance Date Due INFLUENZA AGE 9 TO ADULT 8/1/2017 PAP AKA CERVICAL CYTOLOGY 4/3/2020 DTaP/Tdap/Td series (6 - Td) 6/27/2022 Allergies as of 6/2/2017  Review Complete On: 6/2/2017 By: Cornelio Amezcua MD  
  
 Severity Noted Reaction Type Reactions Sulfa (Sulfonamide Antibiotics)  07/09/2012    Hives Current Immunizations  Reviewed on 12/15/2015 Name Date DTAP Vaccine 9/13/1992, 3/28/1990, 1990, 1990 HPV (Quad) 6/6/2014, 8/19/2013 Hep A Vaccine (Adult) 6/6/2014, 8/19/2013 Hepatitis B Vaccine 1/17/2013, 11/29/2012, 6/27/2012 IPV 9/13/1992, 3/28/1990, 3/8/1989, 1988, 1988 Influenza Vaccine 10/3/2016, 10/12/2013, 1/8/2013 MMR Vaccine 9/13/1992, 11/30/1989 TB Skin Test (PPD) Intradermal 12/15/2015 11:59 AM, 6/19/2013 11:33 AM  
 TDAP Vaccine 6/27/2012 Not reviewed this visit You Were Diagnosed With   
  
 Codes Comments Lower abdominal pain    -  Primary ICD-10-CM: R10.30 ICD-9-CM: 789.09 Bacterial vaginosis     ICD-10-CM: N76.0, B96.89 
ICD-9-CM: 616.10, 041.9 Yeast vaginitis     ICD-10-CM: B37.3 ICD-9-CM: 112.1 Vitals BP Pulse Temp Resp Height(growth percentile) Weight(growth percentile) 132/72 90 98.2 °F (36.8 °C) (Oral) 16 5' 3\" (1.6 m) 140 lb (63.5 kg) SpO2 BMI OB Status Smoking Status 97% 24.8 kg/m2 Implant Never Smoker Vitals History BMI and BSA Data Body Mass Index Body Surface Area  
 24.8 kg/m 2 1.68 m 2 Preferred Pharmacy Pharmacy Name Phone Nkechi Rocha [address], [place] [address] [facility] [phone] Your Updated Medication List  
  
   
 This list is accurate as of: 6/2/17 11:59 AM.  Always use your most recent med list.  
  
  
  
  
 cholecalciferol 1,000 unit tablet Commonly known as:  VITAMIN D3 Take 1 Tab by mouth daily. fluconazole 150 mg tablet Commonly known as:  DIFLUCAN Take 1 Tab by mouth daily for 1 day. FDA advises cautious prescribing of oral fluconazole in pregnancy. ibuprofen 800 mg tablet Commonly known as:  MOTRIN Take 1 Tab by mouth every eight (8) hours as needed for Pain. metroNIDAZOLE 500 mg tablet Commonly known as:  FLAGYL Take 1 Tab by mouth two (2) times a day for 7 days. Prescriptions Sent to Pharmacy Refills  
 metroNIDAZOLE (FLAGYL) 500 mg tablet 0 Sig: Take 1 Tab by mouth two (2) times a day for 7 days. Class: Normal  
 Pharmacy: [address] #: [phone] Route: Oral  
 fluconazole (DIFLUCAN) 150 mg tablet 0 Sig: Take 1 Tab by mouth daily for 1 day. FDA advises cautious prescribing of oral fluconazole in pregnancy. Class: Normal  
 Pharmacy: [address] #: [phone] Route: Oral  
 ibuprofen (MOTRIN) 800 mg tablet 0 Sig: Take 1 Tab by mouth every eight (8) hours as needed for Pain. Class: Normal  
 Pharmacy: [address] #: [phone] Route: Oral  
  
We Performed the Following AMB POC URINALYSIS DIP STICK AUTO W/O MICRO [58281 CPT(R)] AMB POC URINE PREGNANCY TEST, VISUAL COLOR COMPARISON [20696 CPT(R)] Follow-up Instructions Return if symptoms worsen or fail to improve. Patient Instructions Candidiasis: Care Instructions Your Care Instructions Candidiasis (say \"mti-gys-CR-uh-margarita\") is a yeast infection. Yeast normally lives in your body. But it can cause problems if your body's defenses don't work as they should. Some medicines can increase your chance of getting a yeast infection. These include antibiotics, steroids, and cancer drugs. And some diseases like AIDS and diabetes can make you more likely to get yeast infections. There are different types of yeast infections. Karma Epley is a yeast infection in the mouth. It usually occurs in people with weak immune systems. It causes white patches inside the mouth and throat. Yeast infections of the skin usually occur in skin folds where the skin stays moist. They cause red, oozing patches on your skin. Babies can get these infections under the diaper. People who often wear gloves can get them on their hands. Many women get vaginal yeast infections. They are most common when women take antibiotics. These infections can cause the vagina to itch and burn. They also cause white discharge that looks like cottage cheese. In rare cases, yeast infects the blood. This can cause serious disease. This kind of infection is treated with medicine given through a needle into a vein (IV). After you start treatment, a yeast infection usually goes away quickly. But if your immune system is weak, the infection may come back. Tell your doctor if you get yeast infections often. Follow-up care is a key part of your treatment and safety. Be sure to make and go to all appointments, and call your doctor if you are having problems. It's also a good idea to know your test results and keep a list of the medicines you take. How can you care for yourself at home? · Take your medicines exactly as prescribed. Call your doctor if you think you are having a problem with your medicine. · Use antibiotics only as directed by your doctor. · Eat yogurt with live cultures. It has bacteria called lactobacillus. It may help prevent some types of yeast infections. · Keep your skin clean and dry. Put powder on moist places.  
· If you are using a cream or suppository to treat a vaginal yeast infection, don't use condoms or a diaphragm. Use a different type of birth control. · Eat a healthy diet and get regular exercise. This will help keep your immune system strong. When should you call for help? Call your doctor now or seek immediate medical care if: 
· You have a fever. · You are pregnant and have signs of a vaginal or urinary tract infection such as: ¨ Severe itching in your vagina. ¨ Pain during sex or when you urinate. ¨ Unusual discharge from your vagina. ¨ A frequent urge to urinate. ¨ Urine that is cloudy or smells bad. Watch closely for changes in your health, and be sure to contact your doctor if: 
· You do not get better as expected. Where can you learn more? Go to http://marilee-greta.info/. Enter D674 in the search box to learn more about \"Candidiasis: Care Instructions. \" Current as of: October 13, 2016 Content Version: 11.2 © 1407-6307 [employer]. Care instructions adapted under license by [employer] (which disclaims liability or warranty for this information). If you have questions about a medical condition or this instruction, always ask your healthcare professional. [place] [address] any warranty or liability for your use of this information. Bacterial Vaginosis: Care Instructions Your Care Instructions Bacterial vaginosis is a type of vaginal infection. It is caused by excess growth of certain bacteria that are normally found in the vagina. Symptoms can include itching, swelling, pain when you urinate or have sex, and a gray or yellow discharge with a \"fishy\" odor. It is not considered an infection that is spread through sexual contact. Although symptoms can be annoying and uncomfortable, bacterial vaginosis does not usually cause other health problems. However, if you have it while you are pregnant, it can cause complications. While the infection may go away on its own, most doctors use antibiotics to treat it. You may have been prescribed pills or vaginal cream. With treatment, bacterial vaginosis usually clears up in 5 to 7 days. Follow-up care is a key part of your treatment and safety. Be sure to make and go to all appointments, and call your doctor if you are having problems. It's also a good idea to know your test results and keep a list of the medicines you take. How can you care for yourself at home? · Take your antibiotics as directed. Do not stop taking them just because you feel better. You need to take the full course of antibiotics. · Do not eat or drink anything that contains alcohol if you are taking metronidazole (Flagyl). · Keep using your medicine if you start your period. Use pads instead of tampons while using a vaginal cream or suppository. Tampons can absorb the medicine. · Wear loose cotton clothing. Do not wear nylon and other materials that hold body heat and moisture close to the skin. · Do not scratch. Relieve itching with a cold pack or a cool bath. · Do not wash your vaginal area more than once a day. Use plain water or a mild, unscented soap. Do not douche. When should you call for help? Watch closely for changes in your health, and be sure to contact your doctor if: 
· You have unexpected vaginal bleeding. · You have a fever. · You have new or increased pain in your vagina or pelvis. · You are not getting better after 1 week. · Your symptoms return after you finish the course of your medicine. Where can you learn more? Go to http://marilee-greta.info/. Virginia Nowak in the search box to learn more about \"Bacterial Vaginosis: Care Instructions. \" Current as of: October 13, 2016 Content Version: 11.2 © 6551-1052 [employer].  Care instructions adapted under license by [employer] (which disclaims liability or warranty for this information). If you have questions about a medical condition or this instruction, always ask your healthcare professional. [address] any warranty or liability for your use of this information. Abdominal Pain: Care Instructions Your Care Instructions Abdominal pain has many possible causes. Some aren't serious and get better on their own in a few days. Others need more testing and treatment. If your pain continues or gets worse, you need to be rechecked and may need more tests to find out what is wrong. You may need surgery to correct the problem. Don't ignore new symptoms, such as fever, nausea and vomiting, urination problems, pain that gets worse, and dizziness. These may be signs of a more serious problem. Your doctor may have recommended a follow-up visit in the next 8 to 12 hours. If you are not getting better, you may need more tests or treatment. The doctor has checked you carefully, but problems can develop later. If you notice any problems or new symptoms, get medical treatment right away. Follow-up care is a key part of your treatment and safety. Be sure to make and go to all appointments, and call your doctor if you are having problems. It's also a good idea to know your test results and keep a list of the medicines you take. How can you care for yourself at home? · Rest until you feel better. · To prevent dehydration, drink plenty of fluids, enough so that your urine is light yellow or clear like water. Choose water and other caffeine-free clear liquids until you feel better. If you have kidney, heart, or liver disease and have to limit fluids, talk with your doctor before you increase the amount of fluids you drink. · If your stomach is upset, eat mild foods, such as rice, dry toast or crackers, bananas, and applesauce. Try eating several small meals instead of two or three large ones. · Wait until 48 hours after all symptoms have gone away before you have spicy foods, alcohol, and drinks that contain caffeine. · Do not eat foods that are high in fat. · Avoid anti-inflammatory medicines such as aspirin, ibuprofen (Advil, Motrin), and naproxen (Aleve). These can cause stomach upset. Talk to your doctor if you take daily aspirin for another health problem. When should you call for help? Call 911 anytime you think you may need emergency care. For example, call if: 
· You passed out (lost consciousness). · You pass maroon or very bloody stools. · You vomit blood or what looks like coffee grounds. · You have new, severe belly pain. Call your doctor now or seek immediate medical care if: 
· Your pain gets worse, especially if it becomes focused in one area of your belly. · You have a new or higher fever. · Your stools are black and look like tar, or they have streaks of blood. · You have unexpected vaginal bleeding. · You have symptoms of a urinary tract infection. These may include: 
¨ Pain when you urinate. ¨ Urinating more often than usual. 
¨ Blood in your urine. · You are dizzy or lightheaded, or you feel like you may faint. Watch closely for changes in your health, and be sure to contact your doctor if: 
· You are not getting better after 1 day (24 hours). Where can you learn more? Go to http://marilee-greta.info/. Enter V709 in the search box to learn more about \"Abdominal Pain: Care Instructions. \" Current as of: May 27, 2016 Content Version: 11.2 © 7605-3382 [employer] Quan. Care instructions adapted under license by [employer] (which disclaims liability or warranty for this information). If you have questions about a medical condition or this instruction, always ask your healthcare professional. [address] any warranty or liability for your use of this information. Introducing hospitals & HEALTH SERVICES! Dear Vazquez Ghosh: Thank you for requesting a [facility] account. Our records indicate that you already have an active [facility] account. You can access your account anytime at [email or web address] Did you know that you can access your hospital and ER discharge instructions at any time in [facility]? You can also review all of your test results from your hospital stay or ER visit. Additional Information If you have questions, please visit the Frequently Asked Questions section of the [facility] website at [email or web address] Remember, [facility] is NOT to be used for urgent needs. For medical emergencies, dial 911. Now available from your iPhone and Android! Please provide this summary of care documentation to your next provider. Your primary care clinician is listed as Amrit Castillo. If you have any questions after today's visit, please call [phone].

## 2017-06-02 NOTE — PROGRESS NOTES
Jimmy Keita  27 y.o. female  1990  [address] [place] [address]  [id number]   [address]: Progress Note  Otho Meigs, MD       Encounter Date: 6/2/2017    Chief Complaint   Patient presents with    Abdominal Pain     lower abdominal pain . ..started Monday throbbing,achy pain. . pt states constant lower abdominal pain. pt rates pain as a 7 on pain scale     History of Present Illness   Jimmy Keita is a 27 y.o. female who presents to clinic today for abdominal pain described as cramping. Denies any abnormal discharge but does have a concern for malodorous discharge. She is currently in a monogamous relationship and had recent intercourse on Saturday. Duration: 5 days. Lower abdominal in location. White vaginal discharge. LMP unsure on implanon-irregular. Occurred since intercourse with her new partner. Intermittent present. Worse with pressure. +associated with mild intermittent breast tenderness. Did not use condoms. She states she shared their status prior to intercourse. Tried motrin for her back which did help her lower back. Denies any urinary symptoms. Denies any fever. Review of Systems   ROS  Negative except as mentioned in HPI. Vitals/Objective:     Vitals:    06/02/17 1042   BP: 132/72   Pulse: 90   Resp: 16   Temp: 98.2 °F (36.8 °C)   TempSrc: Oral   SpO2: 97%   Weight: 140 lb (63.5 kg)   Height: 5' 3\" (1.6 m)     Body mass index is 24.8 kg/(m^2). Physical Exam  Gen: NAD, NCAT  CTAB no w/r/r  RRR no m/r/g  +BS, soft ND. No rebound or guarding. Diffuse TTP specifically lower abd.      Recent Results (from the past 24 hour(s))   AMB POC URINALYSIS DIP STICK AUTO W/O MICRO    Collection Time: 06/02/17 11:00 AM   Result Value Ref Range    Color (UA POC) Yellow     Clarity (UA POC) Clear     Glucose (UA POC) Negative Negative    Bilirubin (UA POC) Negative Negative    Ketones (UA POC) Negative Negative    Specific gravity (UA POC) 1.010 1.001 - 1.035    Blood (UA POC) Trace Negative    pH (UA POC) 6.5 4.6 - 8.0    Protein (UA POC) Negative Negative mg/dL    Urobilinogen (UA POC) 0.2 mg/dL 0.2 - 1    Nitrites (UA POC) Negative Negative    Leukocyte esterase (UA POC) Negative Negative     Assessment and Plan:   1. Lower abdominal pain  UPT negative. Will empirically treat with flagyl and diflucan based on hx. Pt advised to return if tx ineffective for additional testing. She does have a new partner. Empiric motrin in the interim. - AMB POC URINALYSIS DIP STICK AUTO W/O MICRO      I have discussed the diagnosis with the patient and the intended plan as seen in the above orders. she has expressed understanding. The patient has received an after-visit summary and questions were answered concerning future plans. I have discussed medication side effects and warnings with the patient as well. Follow-up Disposition: Not on File    Electronically Signed: Luisa Cortes MD     History   Patients past medical, surgical and family histories were reviewed and updated. Past Medical History:   Diagnosis Date    Headache      Past Surgical History:   Procedure Laterality Date    HX WISDOM TEETH EXTRACTION  08/14/2015     Family History   Problem Relation Age of Onset    Hypertension Mother      Social History     Social History    Marital status: SINGLE     Spouse name: N/A    Number of children: N/A    Years of education: N/A     Occupational History    Not on file.      Social History Main Topics    Smoking status: Never Smoker    Smokeless tobacco: Never Used    Alcohol use Yes      Comment: occasionally    Drug use: No    Sexual activity: Yes     Partners: Male     Birth control/ protection: Implant     Other Topics Concern    Not on file     Social History Narrative            Current Medications/Allergies     Current Outpatient Prescriptions   Medication Sig Dispense Refill    cholecalciferol (VITAMIN D3) 1,000 unit tablet Take 1 Tab by mouth daily.  90 Tab 4     Allergies   Allergen Reactions    Sulfa (Sulfonamide Antibiotics) Hives

## 2017-06-02 NOTE — PATIENT INSTRUCTIONS
Candidiasis: Care Instructions  Your Care Instructions  Candidiasis (say \"umt-mer-TV-uh-margarita\") is a yeast infection. Yeast normally lives in your body. But it can cause problems if your body's defenses don't work as they should. Some medicines can increase your chance of getting a yeast infection. These include antibiotics, steroids, and cancer drugs. And some diseases like AIDS and diabetes can make you more likely to get yeast infections. There are different types of yeast infections. Merline Dixon is a yeast infection in the mouth. It usually occurs in people with weak immune systems. It causes white patches inside the mouth and throat. Yeast infections of the skin usually occur in skin folds where the skin stays moist. They cause red, oozing patches on your skin. Babies can get these infections under the diaper. People who often wear gloves can get them on their hands. Many women get vaginal yeast infections. They are most common when women take antibiotics. These infections can cause the vagina to itch and burn. They also cause white discharge that looks like cottage cheese. In rare cases, yeast infects the blood. This can cause serious disease. This kind of infection is treated with medicine given through a needle into a vein (IV). After you start treatment, a yeast infection usually goes away quickly. But if your immune system is weak, the infection may come back. Tell your doctor if you get yeast infections often. Follow-up care is a key part of your treatment and safety. Be sure to make and go to all appointments, and call your doctor if you are having problems. It's also a good idea to know your test results and keep a list of the medicines you take. How can you care for yourself at home? · Take your medicines exactly as prescribed. Call your doctor if you think you are having a problem with your medicine. · Use antibiotics only as directed by your doctor. · Eat yogurt with live cultures.  It has bacteria called lactobacillus. It may help prevent some types of yeast infections. · Keep your skin clean and dry. Put powder on moist places. · If you are using a cream or suppository to treat a vaginal yeast infection, don't use condoms or a diaphragm. Use a different type of birth control. · Eat a healthy diet and get regular exercise. This will help keep your immune system strong. When should you call for help? Call your doctor now or seek immediate medical care if:  · You have a fever. · You are pregnant and have signs of a vaginal or urinary tract infection such as:  ¨ Severe itching in your vagina. ¨ Pain during sex or when you urinate. ¨ Unusual discharge from your vagina. ¨ A frequent urge to urinate. ¨ Urine that is cloudy or smells bad. Watch closely for changes in your health, and be sure to contact your doctor if:  · You do not get better as expected. Where can you learn more? Go to http://marileeQuadrille IngÃƒÂ©nieriegreta.info/. Enter I062 in the search box to learn more about \"Candidiasis: Care Instructions. \"  Current as of: October 13, 2016  Content Version: 11.2  © 3603-4069 [employer]. Care instructions adapted under license by [employer] (which disclaims liability or warranty for this information). If you have questions about a medical condition or this instruction, always ask your healthcare professional. [address] any warranty or liability for your use of this information. Bacterial Vaginosis: Care Instructions  Your Care Instructions    Bacterial vaginosis is a type of vaginal infection. It is caused by excess growth of certain bacteria that are normally found in the vagina. Symptoms can include itching, swelling, pain when you urinate or have sex, and a gray or yellow discharge with a \"fishy\" odor. It is not considered an infection that is spread through sexual contact.   Although symptoms can be annoying and uncomfortable, bacterial vaginosis does not usually cause other health problems. However, if you have it while you are pregnant, it can cause complications. While the infection may go away on its own, most doctors use antibiotics to treat it. You may have been prescribed pills or vaginal cream. With treatment, bacterial vaginosis usually clears up in 5 to 7 days. Follow-up care is a key part of your treatment and safety. Be sure to make and go to all appointments, and call your doctor if you are having problems. It's also a good idea to know your test results and keep a list of the medicines you take. How can you care for yourself at home? · Take your antibiotics as directed. Do not stop taking them just because you feel better. You need to take the full course of antibiotics. · Do not eat or drink anything that contains alcohol if you are taking metronidazole (Flagyl). · Keep using your medicine if you start your period. Use pads instead of tampons while using a vaginal cream or suppository. Tampons can absorb the medicine. · Wear loose cotton clothing. Do not wear nylon and other materials that hold body heat and moisture close to the skin. · Do not scratch. Relieve itching with a cold pack or a cool bath. · Do not wash your vaginal area more than once a day. Use plain water or a mild, unscented soap. Do not douche. When should you call for help? Watch closely for changes in your health, and be sure to contact your doctor if:  · You have unexpected vaginal bleeding. · You have a fever. · You have new or increased pain in your vagina or pelvis. · You are not getting better after 1 week. · Your symptoms return after you finish the course of your medicine. Where can you learn more? Go to http://marilee-greta.info/. Sundeep Pulling in the search box to learn more about \"Bacterial Vaginosis: Care Instructions. \"  Current as of: October 13, 2016  Content Version: 11.2  © 2218-7880 [employer]. Care instructions adapted under license by [employer] (which disclaims liability or warranty for this information). If you have questions about a medical condition or this instruction, always ask your healthcare professional. [address] any warranty or liability for your use of this information. Abdominal Pain: Care Instructions  Your Care Instructions    Abdominal pain has many possible causes. Some aren't serious and get better on their own in a few days. Others need more testing and treatment. If your pain continues or gets worse, you need to be rechecked and may need more tests to find out what is wrong. You may need surgery to correct the problem. Don't ignore new symptoms, such as fever, nausea and vomiting, urination problems, pain that gets worse, and dizziness. These may be signs of a more serious problem. Your doctor may have recommended a follow-up visit in the next 8 to 12 hours. If you are not getting better, you may need more tests or treatment. The doctor has checked you carefully, but problems can develop later. If you notice any problems or new symptoms, get medical treatment right away. Follow-up care is a key part of your treatment and safety. Be sure to make and go to all appointments, and call your doctor if you are having problems. It's also a good idea to know your test results and keep a list of the medicines you take. How can you care for yourself at home? · Rest until you feel better. · To prevent dehydration, drink plenty of fluids, enough so that your urine is light yellow or clear like water. Choose water and other caffeine-free clear liquids until you feel better. If you have kidney, heart, or liver disease and have to limit fluids, talk with your doctor before you increase the amount of fluids you drink. · If your stomach is upset, eat mild foods, such as rice, dry toast or crackers, bananas, and applesauce.  Try eating several small meals instead of two or three large ones. · Wait until 48 hours after all symptoms have gone away before you have spicy foods, alcohol, and drinks that contain caffeine. · Do not eat foods that are high in fat. · Avoid anti-inflammatory medicines such as aspirin, ibuprofen (Advil, Motrin), and naproxen (Aleve). These can cause stomach upset. Talk to your doctor if you take daily aspirin for another health problem. When should you call for help? Call 911 anytime you think you may need emergency care. For example, call if:  · You passed out (lost consciousness). · You pass maroon or very bloody stools. · You vomit blood or what looks like coffee grounds. · You have new, severe belly pain. Call your doctor now or seek immediate medical care if:  · Your pain gets worse, especially if it becomes focused in one area of your belly. · You have a new or higher fever. · Your stools are black and look like tar, or they have streaks of blood. · You have unexpected vaginal bleeding. · You have symptoms of a urinary tract infection. These may include:  ¨ Pain when you urinate. ¨ Urinating more often than usual.  ¨ Blood in your urine. · You are dizzy or lightheaded, or you feel like you may faint. Watch closely for changes in your health, and be sure to contact your doctor if:  · You are not getting better after 1 day (24 hours). Where can you learn more? Go to http://marilee-greta.info/. Enter T535 in the search box to learn more about \"Abdominal Pain: Care Instructions. \"  Current as of: May 27, 2016  Content Version: 11.2  © 5908-0887 [employer]. Care instructions adapted under license by [employer] (which disclaims liability or warranty for this information).  If you have questions about a medical condition or this instruction, always ask your healthcare professional. [address]  any warranty or liability for your use of this information.

## 2017-09-26 ENCOUNTER — TELEPHONE (OUTPATIENT)
Dept: FAMILY MEDICINE CLINIC | Age: 27
End: 2017-09-26

## 2017-09-26 NOTE — TELEPHONE ENCOUNTER
Spoke with patient to schedule an appointment with Dr. Gilma Hazel. Patient stating she wanted to be checked for a possible bacterial infection due to having symptoms of whitish discharge and odor . Patient stated she only wants to see Dr. Gilma Hazel.  Patient was scheduled on 9/26/17 at 6:15pm . (appointment okayed per Dr. Gilma Hazel)

## 2017-09-26 NOTE — TELEPHONE ENCOUNTER
---[phone]    Patient called the office to make an appointment for today for a checkup stating personal.    No appointment available for today and patient asked to speak with physician's nurse. Nurse aware and took the call.

## 2017-09-29 ENCOUNTER — OFFICE VISIT (OUTPATIENT)
Dept: FAMILY MEDICINE CLINIC | Age: 27
End: 2017-09-29

## 2017-09-29 VITALS
BODY MASS INDEX: 27.11 KG/M2 | SYSTOLIC BLOOD PRESSURE: 144 MMHG | OXYGEN SATURATION: 96 % | TEMPERATURE: 97.9 F | HEART RATE: 97 BPM | DIASTOLIC BLOOD PRESSURE: 84 MMHG | WEIGHT: 153 LBS | HEIGHT: 63 IN

## 2017-09-29 DIAGNOSIS — N76.0 BACTERIAL VAGINOSIS: Primary | ICD-10-CM

## 2017-09-29 DIAGNOSIS — Z23 ENCOUNTER FOR IMMUNIZATION: ICD-10-CM

## 2017-09-29 DIAGNOSIS — B96.89 BACTERIAL VAGINOSIS: Primary | ICD-10-CM

## 2017-09-29 DIAGNOSIS — B37.31 YEAST VAGINITIS: ICD-10-CM

## 2017-09-29 RX ORDER — METRONIDAZOLE 500 MG/1
500 TABLET ORAL 2 TIMES DAILY
Qty: 14 TAB | Refills: 0 | Status: SHIPPED | OUTPATIENT
Start: 2017-09-29 | End: 2017-10-06

## 2017-09-29 RX ORDER — DICYCLOMINE HYDROCHLORIDE 10 MG/1
CAPSULE ORAL
COMMUNITY
Start: 2017-08-02 | End: 2017-10-09 | Stop reason: SDUPTHER

## 2017-09-29 RX ORDER — RANITIDINE 150 MG/1
150 TABLET, FILM COATED ORAL AS NEEDED
COMMUNITY
End: 2019-03-13 | Stop reason: SDUPTHER

## 2017-09-29 RX ORDER — FLUCONAZOLE 150 MG/1
150 TABLET ORAL DAILY
Qty: 1 TAB | Refills: 0 | Status: SHIPPED | OUTPATIENT
Start: 2017-09-29 | End: 2017-09-30

## 2017-09-29 NOTE — PROGRESS NOTES
Pleasant Litten  32 y.o. female  1990  [address] [place] [address]  [id number]   [address]: Progress Note  Isaac Stiles MD       Encounter Date: 9/29/2017    Chief Complaint   Patient presents with    Vaginal Discharge     History of Present Illness   Pleasant Litten is a 32 y.o. female who presents to clinic today for vaginal discharge. Currently sexually active. Concerned that she has recurrent BV. Duration: x few days. Associated with scant white discharge with odor. Denies any concern for STIs. implanon for contraception. No condoms. She has been with him for 6 months. Denies any abdominal pain or dysuria. Review of Systems   Review of Systems   Constitutional: Negative. Vitals/Objective:     Vitals:    09/29/17 1807   BP: 144/84   Pulse: 97   Temp: 97.9 °F (36.6 °C)   TempSrc: Oral   SpO2: 96%   Weight: 153 lb (69.4 kg)   Height: 5' 3\" (1.6 m)     Body mass index is 27.1 kg/(m^2). Physical Exam   Constitutional: She appears well-developed and well-nourished. No distress. Abdominal: Soft. Bowel sounds are normal. She exhibits no distension. There is no tenderness. There is no rebound and no guarding. Genitourinary: Vagina normal. Cervix exhibits discharge (white frothy discharge. scant odor. nuswab sent. exam chaperoned by RODNEY Brooks). Skin: She is not diaphoretic. No results found for this or any previous visit (from the past 24 hour(s)). Assessment and Plan:   1. Bacterial vaginosis  Empiric treatment based on exam and hx. Awaiting final results. - metroNIDAZOLE (FLAGYL) 500 mg tablet; Take 1 Tab by mouth two (2) times a day for 7 days. Dispense: 14 Tab; Refill: 0  - NUSWAB VAGINITIS PLUS    2. Yeast vaginitis    - fluconazole (DIFLUCAN) 150 mg tablet; Take 1 Tab by mouth daily for 1 day. FDA advises cautious prescribing of oral fluconazole in pregnancy. Dispense: 1 Tab; Refill: 0  - NUSWAB VAGINITIS PLUS    3. Encounter for immunization    - INFLUENZA VIRUS VACCINE QUADRIVALENT, PRESERVATIVE FREE SYRINGE (18365)    I have discussed the diagnosis with the patient and the intended plan as seen in the above orders. she has expressed understanding. The patient has received an after-visit summary and questions were answered concerning future plans. I have discussed medication side effects and warnings with the patient as well. Follow-up Disposition:  Return if symptoms worsen or fail to improve. Electronically Signed: Sara White MD     History   Patients past medical, surgical and family histories were reviewed and updated. Past Medical History:   Diagnosis Date    Headache      Past Surgical History:   Procedure Laterality Date    HX WISDOM TEETH EXTRACTION  08/14/2015     Family History   Problem Relation Age of Onset    Hypertension Mother      Social History     Social History    Marital status: SINGLE     Spouse name: N/A    Number of children: N/A    Years of education: N/A     Occupational History    Not on file. Social History Main Topics    Smoking status: Never Smoker    Smokeless tobacco: Never Used    Alcohol use Yes      Comment: occasionally    Drug use: No    Sexual activity: Yes     Partners: Male     Birth control/ protection: Implant     Other Topics Concern    Not on file     Social History Narrative            Current Medications/Allergies     Current Outpatient Prescriptions   Medication Sig Dispense Refill    dicyclomine (BENTYL) 10 mg capsule       raNITIdine (ZANTAC) 150 mg tablet Take 150 mg by mouth daily.  metroNIDAZOLE (FLAGYL) 500 mg tablet Take 1 Tab by mouth two (2) times a day for 7 days. 14 Tab 0    fluconazole (DIFLUCAN) 150 mg tablet Take 1 Tab by mouth daily for 1 day. FDA advises cautious prescribing of oral fluconazole in pregnancy. 1 Tab 0    ibuprofen (MOTRIN) 800 mg tablet Take 1 Tab by mouth every eight (8) hours as needed for Pain.  27 Tab 0    cholecalciferol (VITAMIN D3) 1,000 unit tablet Take 1 Tab by mouth daily.  90 Tab 4     Allergies   Allergen Reactions    Sulfa (Sulfonamide Antibiotics) Hives

## 2017-09-29 NOTE — PATIENT INSTRUCTIONS
Vaginal Yeast Infection: Care Instructions  Your Care Instructions  A vaginal yeast infection is caused by too many yeast cells in the vagina. This is common in women of all ages. Itching, vaginal discharge and irritation, and other symptoms can bother you. But yeast infections don't often cause other health problems. Some medicines can increase your risk of getting a yeast infection. These include antibiotics, birth control pills, hormones, and steroids. You may also be more likely to get a yeast infection if you are pregnant, have diabetes, douche, or wear tight clothes. With treatment, most yeast infections get better in 2 to 3 days. Follow-up care is a key part of your treatment and safety. Be sure to make and go to all appointments, and call your doctor if you are having problems. It's also a good idea to know your test results and keep a list of the medicines you take. How can you care for yourself at home? · Take your medicines exactly as prescribed. Call your doctor if you think you are having a problem with your medicine. · Ask your doctor about over-the-counter (OTC) medicines for yeast infections. They may cost less than prescription medicines. If you use an OTC treatment, read and follow all instructions on the label. · Do not use tampons while using a vaginal cream or suppository. The tampons can absorb the medicine. Use pads instead. · Wear loose cotton clothing. Do not wear nylon or other fabric that holds body heat and moisture close to the skin. · Try sleeping without underwear. · Do not scratch. Relieve itching with a cold pack or a cool bath. · Do not wash your vaginal area more than once a day. Use plain water or a mild, unscented soap. Air-dry the vaginal area. · Change out of wet swimsuits after swimming. · Do not have sex until you have finished your treatment. · Do not douche. When should you call for help?   Call your doctor now or seek immediate medical care if:  · You have unexpected vaginal bleeding. · You have new or increased pain in your vagina or pelvis. Watch closely for changes in your health, and be sure to contact your doctor if:  · You have a fever. · You are not getting better after 2 days. · Your symptoms come back after you finish your medicines. Where can you learn more? Go to http://marilee-greta.info/. Enter F869 in the search box to learn more about \"Vaginal Yeast Infection: Care Instructions. \"  Current as of: October 13, 2016  Content Version: 11.3  © 8670-4561 [employer]. Care instructions adapted under license by [employer] (which disclaims liability or warranty for this information). If you have questions about a medical condition or this instruction, always ask your healthcare professional. [address] any warranty or liability for your use of this information. Bacterial Vaginosis: Care Instructions  Your Care Instructions    Bacterial vaginosis is a type of vaginal infection. It is caused by excess growth of certain bacteria that are normally found in the vagina. Symptoms can include itching, swelling, pain when you urinate or have sex, and a gray or yellow discharge with a \"fishy\" odor. It is not considered an infection that is spread through sexual contact. Although symptoms can be annoying and uncomfortable, bacterial vaginosis does not usually cause other health problems. However, if you have it while you are pregnant, it can cause complications. While the infection may go away on its own, most doctors use antibiotics to treat it. You may have been prescribed pills or vaginal cream. With treatment, bacterial vaginosis usually clears up in 5 to 7 days. Follow-up care is a key part of your treatment and safety. Be sure to make and go to all appointments, and call your doctor if you are having problems.  It's also a good idea to know your test results and keep a list of the medicines you take. How can you care for yourself at home? · Take your antibiotics as directed. Do not stop taking them just because you feel better. You need to take the full course of antibiotics. · Do not eat or drink anything that contains alcohol if you are taking metronidazole (Flagyl). · Keep using your medicine if you start your period. Use pads instead of tampons while using a vaginal cream or suppository. Tampons can absorb the medicine. · Wear loose cotton clothing. Do not wear nylon and other materials that hold body heat and moisture close to the skin. · Do not scratch. Relieve itching with a cold pack or a cool bath. · Do not wash your vaginal area more than once a day. Use plain water or a mild, unscented soap. Do not douche. When should you call for help? Watch closely for changes in your health, and be sure to contact your doctor if:  · You have unexpected vaginal bleeding. · You have a fever. · You have new or increased pain in your vagina or pelvis. · You are not getting better after 1 week. · Your symptoms return after you finish the course of your medicine. Where can you learn more? Go to http://marilee-greta.info/. Jayesh Nicholson in the search box to learn more about \"Bacterial Vaginosis: Care Instructions. \"  Current as of: October 13, 2016  Content Version: 11.3  © 9932-1481 [employer]. Care instructions adapted under license by [employer] (which disclaims liability or warranty for this information). If you have questions about a medical condition or this instruction, always ask your healthcare professional. [place] [address] any warranty or liability for your use of this information.

## 2017-09-29 NOTE — MR AVS SNAPSHOT
Visit Information Date & Time Provider Department Dept. Phone Encounter #  
 9/29/2017  6:15 PM Bri Metcalf, [address] [facility] [phone] [id number] Follow-up Instructions Return if symptoms worsen or fail to improve. Upcoming Health Maintenance Date Due INFLUENZA AGE 9 TO ADULT 8/1/2017 PAP AKA CERVICAL CYTOLOGY 4/3/2020 DTaP/Tdap/Td series (6 - Td) 6/27/2022 Allergies as of 9/29/2017  Review Complete On: 9/29/2017 By: Bri Metcalf MD  
  
 Severity Noted Reaction Type Reactions Sulfa (Sulfonamide Antibiotics)  07/09/2012    Hives Current Immunizations  Reviewed on 12/15/2015 Name Date DTAP Vaccine 9/13/1992, 3/28/1990, 1988, 1988 HPV (Quad) 6/6/2014, 8/19/2013 Hep A Vaccine (Adult) 6/6/2014, 8/19/2013 Hepatitis B Vaccine 1/17/2013, 11/29/2012, 6/27/2012 IPV 9/13/1992, 3/28/1990, 3/8/1989, 1988, 1988 Influenza Vaccine 10/3/2016, 10/12/2013, 1/8/2013 MMR Vaccine 9/13/1992, 11/30/1989 TB Skin Test (PPD) Intradermal 12/15/2015 11:59 AM, 6/19/2013 11:33 AM  
 TDAP Vaccine 6/27/2012 Not reviewed this visit You Were Diagnosed With   
  
 Codes Comments Bacterial vaginosis    -  Primary ICD-10-CM: N76.0, B96.89 
ICD-9-CM: 616.10, 041.9 Yeast vaginitis     ICD-10-CM: B37.3 ICD-9-CM: 112.1 Vitals BP Pulse Temp Height(growth percentile) Weight(growth percentile) LMP  
 144/84 (BP 1 Location: Left arm) 97 97.9 °F (36.6 °C) (Oral) 5' 3\" (1.6 m) 153 lb (69.4 kg) (LMP Unknown) SpO2 BMI OB Status Smoking Status 96% 27.1 kg/m2 Implant Never Smoker BMI and BSA Data Body Mass Index Body Surface Area  
 27.1 kg/m 2 1.76 m 2 Preferred Pharmacy Pharmacy Name Phone Sandeep [facility]sonk[facility] [address] [facility] [phone] Your Updated Medication List  
  
   
 This list is accurate as of: 9/29/17  6:32 PM.  Always use your most recent med list.  
  
  
  
  
 cholecalciferol 1,000 unit tablet Commonly known as:  VITAMIN D3 Take 1 Tab by mouth daily. dicyclomine 10 mg capsule Commonly known as:  BENTYL  
  
 fluconazole 150 mg tablet Commonly known as:  DIFLUCAN Take 1 Tab by mouth daily for 1 day. FDA advises cautious prescribing of oral fluconazole in pregnancy. ibuprofen 800 mg tablet Commonly known as:  MOTRIN Take 1 Tab by mouth every eight (8) hours as needed for Pain. metroNIDAZOLE 500 mg tablet Commonly known as:  FLAGYL Take 1 Tab by mouth two (2) times a day for 7 days. raNITIdine 150 mg tablet Commonly known as:  ZANTAC Take 150 mg by mouth daily. Prescriptions Sent to Pharmacy Refills  
 metroNIDAZOLE (FLAGYL) 500 mg tablet 0 Sig: Take 1 Tab by mouth two (2) times a day for 7 days. Class: Normal  
 [place] [address] [place]: [phone] Route: Oral  
 fluconazole (DIFLUCAN) 150 mg tablet 0 Sig: Take 1 Tab by mouth daily for 1 day. FDA advises cautious prescribing of oral fluconazole in pregnancy. Class: Normal  
 Pharm[place] [address] [place] #: [phone] Route: Oral  
  
We Performed the Following [address] [id number] Custom] Follow-up Instructions Return if symptoms worsen or fail to improve. Patient Instructions Vaginal Yeast Infection: Care Instructions Your Care Instructions A vaginal yeast infection is caused by too many yeast cells in the vagina. This is common in women of all ages. Itching, vaginal discharge and irritation, and other symptoms can bother you. But yeast infections don't often cause other health problems. Some medicines can increase your risk of getting a yeast infection.  These include antibiotics, birth control pills, hormones, and steroids. You may also be more likely to get a yeast infection if you are pregnant, have diabetes, douche, or wear tight clothes. With treatment, most yeast infections get better in 2 to 3 days. Follow-up care is a key part of your treatment and safety. Be sure to make and go to all appointments, and call your doctor if you are having problems. It's also a good idea to know your test results and keep a list of the medicines you take. How can you care for yourself at home? · Take your medicines exactly as prescribed. Call your doctor if you think you are having a problem with your medicine. · Ask your doctor about over-the-counter (OTC) medicines for yeast infections. They may cost less than prescription medicines. If you use an OTC treatment, read and follow all instructions on the label. · Do not use tampons while using a vaginal cream or suppository. The tampons can absorb the medicine. Use pads instead. · Wear loose cotton clothing. Do not wear nylon or other fabric that holds body heat and moisture close to the skin. · Try sleeping without underwear. · Do not scratch. Relieve itching with a cold pack or a cool bath. · Do not wash your vaginal area more than once a day. Use plain water or a mild, unscented soap. Air-dry the vaginal area. · Change out of wet swimsuits after swimming. · Do not have sex until you have finished your treatment. · Do not douche. When should you call for help? Call your doctor now or seek immediate medical care if: 
· You have unexpected vaginal bleeding. · You have new or increased pain in your vagina or pelvis. Watch closely for changes in your health, and be sure to contact your doctor if: 
· You have a fever. · You are not getting better after 2 days. · Your symptoms come back after you finish your medicines. Where can you learn more? Go to http://marilee-greta.info/. Enter J645 in the search box to learn more about \"Vaginal Yeast Infection: Care Instructions. \" Current as of: October 13, 2016 Content Version: 11.3 © 7937-2810 [employer]. Care instructions adapted under license by [employer] (which disclaims liability or warranty for this information). If you have questions about a medical condition or this instruction, always ask your healthcare professional. [address] any warranty or liability for your use of this information. Bacterial Vaginosis: Care Instructions Your Care Instructions Bacterial vaginosis is a type of vaginal infection. It is caused by excess growth of certain bacteria that are normally found in the vagina. Symptoms can include itching, swelling, pain when you urinate or have sex, and a gray or yellow discharge with a \"fishy\" odor. It is not considered an infection that is spread through sexual contact. Although symptoms can be annoying and uncomfortable, bacterial vaginosis does not usually cause other health problems. However, if you have it while you are pregnant, it can cause complications. While the infection may go away on its own, most doctors use antibiotics to treat it. You may have been prescribed pills or vaginal cream. With treatment, bacterial vaginosis usually clears up in 5 to 7 days. Follow-up care is a key part of your treatment and safety. Be sure to make and go to all appointments, and call your doctor if you are having problems. It's also a good idea to know your test results and keep a list of the medicines you take. How can you care for yourself at home? · Take your antibiotics as directed. Do not stop taking them just because you feel better. You need to take the full course of antibiotics. · Do not eat or drink anything that contains alcohol if you are taking metronidazole (Flagyl). · Keep using your medicine if you start your period.  Use pads instead of tampons while using a vaginal cream or suppository. Tampons can absorb the medicine. · Wear loose cotton clothing. Do not wear nylon and other materials that hold body heat and moisture close to the skin. · Do not scratch. Relieve itching with a cold pack or a cool bath. · Do not wash your vaginal area more than once a day. Use plain water or a mild, unscented soap. Do not douche. When should you call for help? Watch closely for changes in your health, and be sure to contact your doctor if: 
· You have unexpected vaginal bleeding. · You have a fever. · You have new or increased pain in your vagina or pelvis. · You are not getting better after 1 week. · Your symptoms return after you finish the course of your medicine. Where can you learn more? Go to http://marilee-greta.info/. Loly Gill in the search box to learn more about \"Bacterial Vaginosis: Care Instructions. \" Current as of: October 13, 2016 Content Version: 11.3 © 4012-3770 [employer]. Care instructions adapted under license by [employer] (which disclaims liability or warranty for this information). If you have questions about a medical condition or this instruction, always ask your healthcare professional. [address] any warranty or liability for your use of this information. Introducing [facility] & HEALTH SERVICES! Dear Magdalena Wong: Thank you for requesting a F[employer] account. Our records indicate that you already have an active Fiest[employer]rog account. You can access your account anytime at [email or web address] Did you know that you can access your hospital and ER discharge instructions at any time in Fiesta Frog? You can also review all of your test results from your hospital stay or ER visit. Additional Information If you have questions, please visit the Frequently Asked Questions section of the Fiesta Frog website at [email or web address] Remember, [employer] is NOT to be used for urgent needs. For medical emergencies, dial 911. Now available from your iPhone and Android! Please provide this summary of care documentation to your next provider. Your primary care clinician is listed as Sybil Schmidt. If you have any questions after today's visit, please call [phone].

## 2017-10-04 LAB
A VAGINAE DNA VAG QL NAA+PROBE: ABNORMAL SCORE
BVAB2 DNA VAG QL NAA+PROBE: ABNORMAL SCORE
C ALBICANS DNA VAG QL NAA+PROBE: NEGATIVE
C GLABRATA DNA VAG QL NAA+PROBE: NEGATIVE
C TRACH RRNA SPEC QL NAA+PROBE: NEGATIVE
MEGA1 DNA VAG QL NAA+PROBE: ABNORMAL SCORE
N GONORRHOEA RRNA SPEC QL NAA+PROBE: NEGATIVE
T VAGINALIS RRNA SPEC QL NAA+PROBE: NEGATIVE

## 2017-10-05 ENCOUNTER — TELEPHONE (OUTPATIENT)
Dept: FAMILY MEDICINE CLINIC | Age: 27
End: 2017-10-05

## 2017-10-05 NOTE — TELEPHONE ENCOUNTER
Patient asking that Dr. Madelyn Hutson or nurse please give her a call about a concern she have. Patient didn't provide any details.     Call, [phone]

## 2017-10-06 NOTE — TELEPHONE ENCOUNTER
Spoke with patient who stated that she has some concerns and would like to speak with Dr. Jam Reyes. Advised patient message would be sent to doctor. Patient verbalized understanding.

## 2017-10-09 RX ORDER — DICYCLOMINE HYDROCHLORIDE 10 MG/1
10 CAPSULE ORAL
Qty: 90 CAP | Refills: 0 | Status: SHIPPED | OUTPATIENT
Start: 2017-10-09 | End: 2019-03-13 | Stop reason: SDUPTHER

## 2017-10-09 NOTE — TELEPHONE ENCOUNTER
Spoke with patient regarding her personal issue. Overall concerned for exacerbation of IBS. She has stopped her bentyl and desires refill. Advised to return if this does not help as to discuss further management. She is s/p EGD and colonoscopy which has been negative.

## 2018-01-08 ENCOUNTER — TELEPHONE (OUTPATIENT)
Dept: FAMILY MEDICINE CLINIC | Age: 28
End: 2018-01-08

## 2018-01-08 DIAGNOSIS — B96.89 BACTERIAL VAGINOSIS: Primary | ICD-10-CM

## 2018-01-08 DIAGNOSIS — N76.0 BACTERIAL VAGINOSIS: Primary | ICD-10-CM

## 2018-01-08 NOTE — TELEPHONE ENCOUNTER
----- Message from Deisy Baig sent at 1/8/2018  2:41 PM EST -----  Regarding: Dr. Kiran Franco Pt is requesting to speak with nurse Princess Lock, she didn't specify what the call was regarding. The best contact is [phone].

## 2018-01-09 NOTE — TELEPHONE ENCOUNTER
Spoke with patient who stated she is having symptoms of bacterial vaginosis which she has been treated for before. Patient is requesting an antibiotic to be sent to pharmacy. Advised patient that a message will be sent to doctor about request.  Advised patient that she may have to schedule an appointment to be evaluated. Patient verbalized understanding.

## 2018-01-10 RX ORDER — METRONIDAZOLE 500 MG/1
500 TABLET ORAL 2 TIMES DAILY
Qty: 14 TAB | Refills: 0 | Status: SHIPPED | OUTPATIENT
Start: 2018-01-10 | End: 2018-01-18

## 2018-01-11 NOTE — TELEPHONE ENCOUNTER
Attempted to call patient to notify per Dr. Koby Collins medication has been sent to pharmacy. Left voicemail for patient to return call.

## 2018-01-12 NOTE — TELEPHONE ENCOUNTER
Notified patient per Tarun Chau prescription has been sent to pharmacy. Patient verbalized understanding.

## 2018-01-18 ENCOUNTER — OFFICE VISIT (OUTPATIENT)
Dept: FAMILY MEDICINE CLINIC | Age: 28
End: 2018-01-18

## 2018-01-18 VITALS
OXYGEN SATURATION: 99 % | DIASTOLIC BLOOD PRESSURE: 64 MMHG | WEIGHT: 154 LBS | BODY MASS INDEX: 27.29 KG/M2 | TEMPERATURE: 97.4 F | HEART RATE: 92 BPM | SYSTOLIC BLOOD PRESSURE: 130 MMHG | HEIGHT: 63 IN | RESPIRATION RATE: 16 BRPM

## 2018-01-18 DIAGNOSIS — R09.81 SINUS CONGESTION: Primary | ICD-10-CM

## 2018-01-18 LAB
FLUAV+FLUBV AG NOSE QL IA.RAPID: NEGATIVE POS/NEG
FLUAV+FLUBV AG NOSE QL IA.RAPID: NEGATIVE POS/NEG
VALID INTERNAL CONTROL?: YES

## 2018-01-18 RX ORDER — GUAIFENESIN AND PSEUDOEPHEDRINE HCL 1200; 120 MG/1; MG/1
1 TABLET, EXTENDED RELEASE ORAL
COMMUNITY
End: 2021-06-03

## 2018-01-18 RX ORDER — FLUTICASONE PROPIONATE 50 MCG
SPRAY, SUSPENSION (ML) NASAL
Qty: 1 BOTTLE | Refills: 1 | Status: SHIPPED | OUTPATIENT
Start: 2018-01-18 | End: 2022-09-19 | Stop reason: SDUPTHER

## 2018-01-18 NOTE — PROGRESS NOTES
Chief Complaint:  Chief Complaint   Patient presents with    Head Pain     times 2 days    Nasal Congestion     HPI  Ellan Councilman is a 32 y.o. female who presents for sinus sxs since yesterday. Noticed she was coughing after going to the gym on Tuesday. Has a lot of sinus pressure in addition to sneezing and pounding HA above her yes. Left nostril feels clogged. Temp this am was 99. Tried mucinex and emergen-C with some, but not complete, relief. Shoveling snow made her feel weak. She is trying to stay plenty hydrated. Needs a note to miss work    SH:  No tobacco, alcohol, drug use    ROS: Positive for items in bold, otherwise reviewed and negative:   Constitutional: headache, fever, fatigue     Eye: eye pain, vision changes  Ear: ear pain, hearing changes  Nose: +congestion with +discharge (clear and sometimes green)  Throat: throat soreness, problems swallowing  Cardiac: chest pain      Resp: cough supressed by mucinex, shortness of breath      GI: nausea, vomiting, diarrhea    Allergies: Allergies   Allergen Reactions    Sulfa (Sulfonamide Antibiotics) Hives     Meds:   Current Outpatient Prescriptions   Medication Sig Dispense Refill    PSEUDOEPHEDRINE-guaiFENesin (MUCINEX D MAXIMUM STRENGTH) Tb12 extended release tablet Take 1 Tab by mouth.  fluticasone (FLONASE) 50 mcg/actuation nasal spray 2 sprays per nostril once daily 1 Bottle 1    dicyclomine (BENTYL) 10 mg capsule Take 1 Cap by mouth three (3) times daily as needed. 90 Cap 0    raNITIdine (ZANTAC) 150 mg tablet Take 150 mg by mouth daily.  ibuprofen (MOTRIN) 800 mg tablet Take 1 Tab by mouth every eight (8) hours as needed for Pain. 30 Tab 0    cholecalciferol (VITAMIN D3) 1,000 unit tablet Take 1 Tab by mouth daily.  90 Tab 4     PMH:  Past Medical History:   Diagnosis Date    Headache(784.0)      Physical Exam:  Visit Vitals    /64    Pulse 92    Temp 97.4 °F (36.3 °C) (Oral)    Resp 16    Ht 5' 3\" (1.6 m)    [employer] 154 lb (69.9 kg)    SpO2 99%    BMI 27.28 kg/m2     Gen: No apparent distress. Alert and oriented and responds to all questions appropriately. Head/face: Positive mild TTP of frontal sinuses bilaterally, no maxillary sinus TTP  Eye: Conjunctiva normal, PERRL, EOMI  Ear: Hearing grossly normal bilaterally, no apparent lesion or abnormality of external ear, bilateral TM's and external ear canals normal  Nose: Nares patent, mucosa pink, inflamed, clear drainage  Throat: MMM, pharynx normal without exudate or significant swelling  Neck: Supple, no lymph nodes, masses, or thyromegaly appreciated  Lungs: Respirations unlabored, clear to auscultation bilaterally  Cardio: Regular rate and rhythm without murmurs, rubs, or gallops   Abdomen: Normoactive bowel sounds, soft, nontender, nondistended  Neuro: CN II-XII grossly intact    Assessment and Plan:       ICD-10-CM ICD-9-CM    1. Sinus congestion R09.81 478.19 AMB POC CHAVA INFLUENZA A/B TEST     Reassuring VS and physical exam.  Rapid flu negative. Likely viral.  Counseled on supportive care. Stay well hydrated. Rx for flonase to help with nasal congestion. Continue mucinex. Ibuprofen to help with pain and inflammation. Can add tylenol if severe sxs. Rtc prn if worsening, not improving. Discussed diagnoses in detail with patient. Patient expressed understanding of and agreement to above plan. All questions and concerns addressed. Patient is counseled to return to the office and/or ED if symptoms do not improve as expected. Patient discussed with Dr. Vasyl Olmos, Attending Physician.     Enriqueta Closs, MD  Family Medicine Resident, PGY-3

## 2018-01-18 NOTE — PROGRESS NOTES
Started with sinus sxs yesterday  Supportive care    I reviewed with the resident the medical history and the resident's findings on the physical examination. I discussed with the resident the patient's diagnosis and concur with the plan.

## 2018-01-18 NOTE — MR AVS SNAPSHOT
[address] 
[phone] Patient: Kassi Shi MRN: [id number] PQX:[id number] Visit Information Date & Time Provider Department Dept. Phone Encounter #  
 1/18/2018  2:45 PM Nena Kaiser [phone] [id number] Follow-up Instructions Return if symptoms worsen or fail to improve. Upcoming Health Maintenance Date Due  
 PAP AKA CERVICAL CYTOLOGY 4/3/2020 DTaP/Tdap/Td series (6 - Td) 6/27/2022 Allergies as of 1/18/2018  Review Complete On: 1/18/2018 By: Swathi Grant LPN Severity Noted Reaction Type Reactions Sulfa (Sulfonamide Antibiotics)  07/09/2012    Hives Current Immunizations  Reviewed on 9/29/2017 Name Date DTAP Vaccine 9/13/1992, 3/28/1990, 1988, 1988 HPV (Quad) 6/6/2014, 8/19/2013 Hep A Vaccine (Adult) 6/6/2014, 8/19/2013 Hepatitis B Vaccine 1/17/2013, 11/29/2012, 6/27/2012 IPV 9/13/1992, 3/28/1990, 3/8/1989, 1988, 1988 Influenza Vaccine 10/3/2016, 10/12/2013, 1/8/2013 Influenza Vaccine (Quad) PF 9/29/2017 MMR Vaccine 9/13/1992, 11/30/1989 TB Skin Test (PPD) Intradermal 12/15/2015 11:59 AM, 6/19/2013 11:33 AM  
 TDAP Vaccine 6/27/2012 Not reviewed this visit You Were Diagnosed With   
  
 Codes Comments Sinus congestion    -  Primary ICD-10-CM: R09.81 ICD-9-CM: 478.19 Vitals BP Pulse Temp Resp Height(growth percentile) Weight(growth percentile) 130/64 92 97.4 °F (36.3 °C) (Oral) 16 5' 3\" (1.6 m) 154 lb (69.9 kg) SpO2 BMI OB Status Smoking Status 99% 27.28 kg/m2 Implant Never Smoker BMI and BSA Data Body Mass Index Body Surface Area  
 27.28 kg/m 2 1.76 m 2 Preferred Pharmacy Pharmacy Name Phone Rachel Presser [address] [place] [phone] Your Updated Medication List  
  
 This list is accurate as of: 18  3:45 PM.  Always use your most recent med list.  
  
  
  
  
 cholecalciferol 1,000 unit tablet Commonly known as:  VITAMIN D3 Take 1 Tab by mouth daily. dicyclomine 10 mg capsule Commonly known as:  BENTYL Take 1 Cap by mouth three (3) times daily as needed. fluticasone 50 mcg/actuation nasal spray Commonly known as:  FLONASE  
2 sprays per nostril once daily  
  
 ibuprofen 800 mg tablet Commonly known as:  MOTRIN Take 1 Tab by mouth every eight (8) hours as needed for Pain. metroNIDAZOLE 500 mg tablet Commonly known as:  FLAGYL Take 1 Tab by mouth two (2) times a day for 7 days. MUCINEX D MAXIMUM STRENGTH Tb12 extended release tablet Generic drug:  PSEUDOEPHEDRINE-guaiFENesin Take 1 Tab by mouth. raNITIdine 150 mg tablet Commonly known as:  ZANTAC Take 150 mg by mouth daily. Prescriptions Sent to Pharmacy Refills  
 fluticasone (FLONASE) 50 mcg/actuation nasal spray 1 Si sprays per nostril once daily Class: Normal  
 Pharmacy: [facility] S[facility]iqu[facility] [address] [place] [address] [address], [place] [address] [place] [address] #: [phone] Follow-up Instructions Return if symptoms worsen or fail to improve. Patient Instructions Viral Infection Tips: 
 
- Antibiotics do not cure viral infections and also may have negative side effects - Take tylenol or ibuprofen as needed per package instructions for pain - Warm salt water gargle every few hours for throat pain - Cough drops for cough - Warm tea with honey for cough - Mucinex over the counter as needed for chest congestion (make sure it doesn't have tylenol/acetaminophen in it because you should not mix multiple products with acetaminophen) - Viral infections can take 14 to 21 days to completely clear. The cough is often the last symptom to resolve. - Return to the office if symptoms worsen or fail to improve Saline Nasal Washes: Care Instructions Your Care Instructions Saline nasal washes help keep the nasal passages open by washing out thick or dried mucus. This simple remedy can help relieve symptoms of allergies, sinusitis, and colds. It also can make the nose feel more comfortable by keeping the mucous membranes moist. You may notice a little burning sensation in your nose the first few times you use the solution, but this usually gets better in a few days. Follow-up care is a key part of your treatment and safety. Be sure to make and go to all appointments, and call your doctor if you are having problems. It's also a good idea to know your test results and keep a list of the medicines you take. How can you care for yourself at home? · You can buy premixed saline solution in a squeeze bottle or other sinus rinse products at a drugstore. Read and follow the instructions on the label. · You also can make your own saline solution by adding 1 teaspoon of salt and 1 teaspoon of baking soda to 2 cups of distilled water. · If you use a homemade solution, pour a small amount into a clean bowl. Using a rubber bulb syringe, squeeze the syringe and place the tip in the salt water. Pull a small amount of the salt water into the syringe by relaxing your hand. · Sit down with your head tilted slightly back. Do not lie down. Put the tip of the bulb syringe or the squeeze bottle a little way into one of your nostrils. Gently drip or squirt a few drops into the nostril. Repeat with the other nostril. Some sneezing and gagging are normal at first. 
· Gently blow your nose. · Wipe the syringe or bottle tip clean after each use. · Repeat this 2 or 3 times a day. · Use nasal washes gently if you have nosebleeds often. When should you call for help? Watch closely for changes in your health, and be sure to contact your doctor if: 
? · You often get nosebleeds. ? · You have problems doing the nasal washes. Where can you learn more? Go to http://marilee-greta.info/. Enter [id number] in the search box to learn more about \"Saline Nasal Washes: Care Instructions. \" Current as of: May 12, 2017 Content Version: 11.4 © 2356-0204 [employer]. Care instructions adapted under license by [employer] (which disclaims liability or warranty for this information). If you have questions about a medical condition or this instruction, always ask your healthcare professional. [address] any warranty or liability for your use of this information. Introducing [facility] & HEALTH SERVICES! Dear Leroy Lauren: Thank you for requesting a [employer] account. Our records indicate that you already have an active Link Medicine account. You can access your account anytime at [email or web address] Did you know that you can access your hospital and ER discharge instructions at any time in Link Medicine? You can also review all of your test results from your hospital stay or ER visit. Additional Information If you have questions, please visit the Frequently Asked Questions section of the Link Medicine website at [email or web address] Remember, Link [employer] is NOT to be used for urgent needs. For medical emergencies, dial 911. Now available from your iPhone and Android! Please provide this summary of care documentation to your next provider. Your primary care clinician is listed as Eugene Castillo. If you have any questions after today's visit, please call [phone].

## 2018-01-18 NOTE — PROGRESS NOTES
Chief Complaint   Patient presents with    Head Pain     times 2 days    Nasal Congestion     1. Have you been to the ER, urgent care clinic since your last visit? Hospitalized since your last visit? No    2. Have you seen or consulted any other health care providers outside of the [address] [place] [address] since your last visit? Include any pap smears or colon screening.  No

## 2018-01-18 NOTE — PATIENT INSTRUCTIONS
Viral Infection Tips:    - Antibiotics do not cure viral infections and also may have negative side effects    - Take tylenol or ibuprofen as needed per package instructions for pain    - Warm salt water gargle every few hours for throat pain     - Cough drops for cough    - Warm tea with honey for cough    - Mucinex over the counter as needed for chest congestion (make sure it doesn't have tylenol/acetaminophen in it because you should not mix multiple products with acetaminophen)    - Viral infections can take 14 to 21 days to completely clear. The cough is often the last symptom to resolve. - Return to the office if symptoms worsen or fail to improve         Saline Nasal Washes: Care Instructions  Your Care Instructions  Saline nasal washes help keep the nasal passages open by washing out thick or dried mucus. This simple remedy can help relieve symptoms of allergies, sinusitis, and colds. It also can make the nose feel more comfortable by keeping the mucous membranes moist. You may notice a little burning sensation in your nose the first few times you use the solution, but this usually gets better in a few days. Follow-up care is a key part of your treatment and safety. Be sure to make and go to all appointments, and call your doctor if you are having problems. It's also a good idea to know your test results and keep a list of the medicines you take. How can you care for yourself at home? · You can buy premixed saline solution in a squeeze bottle or other sinus rinse products at a drugstore. Read and follow the instructions on the label. · You also can make your own saline solution by adding 1 teaspoon of salt and 1 teaspoon of baking soda to 2 cups of distilled water. · If you use a homemade solution, pour a small amount into a clean bowl. Using a rubber bulb syringe, squeeze the syringe and place the tip in the salt water.  Pull a small amount of the salt water into the syringe by relaxing your hand.  · Sit down with your head tilted slightly back. Do not lie down. Put the tip of the bulb syringe or the squeeze bottle a little way into one of your nostrils. Gently drip or squirt a few drops into the nostril. Repeat with the other nostril. Some sneezing and gagging are normal at first.  · Gently blow your nose. · Wipe the syringe or bottle tip clean after each use. · Repeat this 2 or 3 times a day. · Use nasal washes gently if you have nosebleeds often. When should you call for help? Watch closely for changes in your health, and be sure to contact your doctor if:  ? · You often get nosebleeds. ? · You have problems doing the nasal washes. Where can you learn more? Go to http://marilee-greta.info/. Enter [phone] [id number] [phone] in the search box to learn more about \"Saline Nasal Washes: Care Instructions. \"  Current as of: May 12, 2017  Content Version: 11.4  © 4119-8619 Healthwise, Incorporated. Care instructions adapted under license by Gl[employer] (which disclaims liability or warranty for this information). If you have questions about a medical condition or this instruction, always ask your healthcare professional. [place] [address] any warranty or liability for your use of this information.

## 2018-01-18 NOTE — LETTER
NOTIFICATION RETURN TO WORK / SCHOOL 
 
1/18/2018 3:45 PM 
 
[facility] Arturo [facility] [address] To Whom It May Concern: 
 
Ellan Councilman is currently under the care of [address]. Please excuse her absence from 1/17/18-1/19/18. She will return to work/school on 1/22/18. If there are questions or concerns please have the patient contact our office. Sincerely, Georgie Farias MD

## 2018-03-27 ENCOUNTER — TELEPHONE (OUTPATIENT)
Dept: FAMILY MEDICINE CLINIC | Age: 28
End: 2018-03-27

## 2018-03-27 NOTE — TELEPHONE ENCOUNTER
Spoke with patient requesting to speak with Dr. Tee Parrish regarding OTC medication Hydrocut Platinum recommended by . Advised patient message will be sent to doctor. Patient verbalized understanding.

## 2018-03-27 NOTE — TELEPHONE ENCOUNTER
Attempted to call patient requesting to speak with physician about an OTC medication. Left voicemail for patient to return call.

## 2018-03-27 NOTE — TELEPHONE ENCOUNTER
[phone]  Patient called to speak with the physician about an OTC medication that she wants information on before taking. She did not give the name of it and this is all the information she would give.

## 2018-03-30 NOTE — TELEPHONE ENCOUNTER
Spoke with  Patient regarding the supplemental medication. I personally researched the ingredients. Discussed it is mostly caffeine products and to be watchful for palpitations and other symptoms. Advised to limit other caffeinated products. Pt expressed understanding and is in agreement with these recommendations.

## 2018-05-08 ENCOUNTER — TELEPHONE (OUTPATIENT)
Dept: FAMILY MEDICINE CLINIC | Age: 28
End: 2018-05-08

## 2018-05-08 NOTE — TELEPHONE ENCOUNTER
Patient would like nurse Hermilo NEWBY to call her. No details provided when asked.     Call [phone]      thanks

## 2018-05-09 NOTE — TELEPHONE ENCOUNTER
Patient stated she has noticed vaginal discharge with a slight odor for 3 days. Patient is requesting a medication refill for Flagyl and Diflucan. Patient requesting to schedule an appointment for complete physical. Appointment scheduled for 5/21/18 with Dr. Andi Drummond. Advised request for medication refill will be sent to doctor. Patient verbalized understanding.

## 2018-05-10 NOTE — TELEPHONE ENCOUNTER
Notified patient per Dr. Escamilla Drummond appointment is required. Patient verbalized understanding.  Appointment scheduled for 5/14/18 at 4:00pm.

## 2018-05-14 ENCOUNTER — OFFICE VISIT (OUTPATIENT)
Dept: FAMILY MEDICINE CLINIC | Age: 28
End: 2018-05-14

## 2018-05-14 VITALS
OXYGEN SATURATION: 96 % | TEMPERATURE: 99.8 F | WEIGHT: 145 LBS | BODY MASS INDEX: 25.69 KG/M2 | HEIGHT: 63 IN | SYSTOLIC BLOOD PRESSURE: 134 MMHG | HEART RATE: 80 BPM | DIASTOLIC BLOOD PRESSURE: 81 MMHG | RESPIRATION RATE: 16 BRPM

## 2018-05-14 DIAGNOSIS — N76.0 BACTERIAL VAGINOSIS: ICD-10-CM

## 2018-05-14 DIAGNOSIS — E55.9 VITAMIN D DEFICIENCY: ICD-10-CM

## 2018-05-14 DIAGNOSIS — N89.8 VAGINAL DISCHARGE: Primary | ICD-10-CM

## 2018-05-14 DIAGNOSIS — B96.89 BACTERIAL VAGINOSIS: ICD-10-CM

## 2018-05-14 RX ORDER — METRONIDAZOLE 500 MG/1
500 TABLET ORAL 2 TIMES DAILY
Qty: 14 TAB | Refills: 0 | Status: SHIPPED | OUTPATIENT
Start: 2018-05-14 | End: 2018-05-21

## 2018-05-14 RX ORDER — FLUCONAZOLE 150 MG/1
150 TABLET ORAL DAILY
Qty: 1 TAB | Refills: 0 | Status: SHIPPED | OUTPATIENT
Start: 2018-05-21 | End: 2018-05-22

## 2018-05-14 NOTE — PATIENT INSTRUCTIONS
Bacterial Vaginosis: Care Instructions  Your Care Instructions    Bacterial vaginosis is a type of vaginal infection. It is caused by excess growth of certain bacteria that are normally found in the vagina. Symptoms can include itching, swelling, pain when you urinate or have sex, and a gray or yellow discharge with a \"fishy\" odor. It is not considered an infection that is spread through sexual contact. Although symptoms can be annoying and uncomfortable, bacterial vaginosis does not usually cause other health problems. However, if you have it while you are pregnant, it can cause complications. While the infection may go away on its own, most doctors use antibiotics to treat it. You may have been prescribed pills or vaginal cream. With treatment, bacterial vaginosis usually clears up in 5 to 7 days. Follow-up care is a key part of your treatment and safety. Be sure to make and go to all appointments, and call your doctor if you are having problems. It's also a good idea to know your test results and keep a list of the medicines you take. How can you care for yourself at home? · Take your antibiotics as directed. Do not stop taking them just because you feel better. You need to take the full course of antibiotics. · Do not eat or drink anything that contains alcohol if you are taking metronidazole (Flagyl). · Keep using your medicine if you start your period. Use pads instead of tampons while using a vaginal cream or suppository. Tampons can absorb the medicine. · Wear loose cotton clothing. Do not wear nylon and other materials that hold body heat and moisture close to the skin. · Do not scratch. Relieve itching with a cold pack or a cool bath. · Do not wash your vaginal area more than once a day. Use plain water or a mild, unscented soap. Do not douche. When should you call for help?   Watch closely for changes in your health, and be sure to contact your doctor if:  ? · You have unexpected vaginal bleeding. ? · You have a fever. ? · You have new or increased pain in your vagina or pelvis. ? · You are not getting better after 1 week. ? · Your symptoms return after you finish the course of your medicine. Where can you learn more? Go to http://marilee-greta.info/. Tammi Brittle in the search box to learn more about \"Bacterial Vaginosis: Care Instructions. \"  Current as of: October 13, 2016  Content Version: 11.4  © 5869-8111 Any.DO. Care instructions adapted under license by Blink for iPhone and Android (which disclaims liability or warranty for this information). If you have questions about a medical condition or this instruction, always ask your healthcare professional. [address] any warranty or liability for your use of this information.

## 2018-05-14 NOTE — PROGRESS NOTES
[facility] Residency Program     Outpatient Resident Progress Note    Encounter Date: 5/14/2018    Chief Complaint   Patient presents with    Vaginal Discharge     thick white discharge with a slight odor  X 1 week      History of Present Illness    Patient is a 32 y.o. female, who presents to clinic for Vaginal Discharge (thick white discharge with a slight odor  X 1 week )  Patient presents with whitish discharge for about a week. The discharge have a moderately strong odor, with no blood. She states that the presentation is very similar to other episodes of BV. Patient is sexually active, nesplanon as contraception, no condoms, 1 male sexual partner, on a relationship for a year. Denies rash, fever, pelvic pain, dysuria, vaginal bleeding, pruritus, or any other concerns at this moment. Patient want to be reevaluated on Vitamin D levels. Review of Systems (Negative unless in bold)    A complete ROS was reviewed and only pertinent items documented. Constitutional: Negative for chills, fever, malaise, fatigue, and weight loss. Respiratory: Negative for cough, hemoptysis, sputum production, shortness of breath, wheezing and stridor. Cardiovascular: Negative for chest pain, palpitations, orthopnea, claudication, leg swelling and PND. Gastrointestinal: Negative for abdominal pain, blood in stool, constipation, diarrhea, heartburn, melena, nausea and vomiting. Genitourinary: Negative for vaginal discharge, vaginal itching or pain, dysuria, flank pain, frequency, hematuria and urgency. Skin: Negative for itching and rash. Psychiatric/Behavioral: Negative for depression, hallucinations, memory loss, substance abuse and suicidal ideas. The patient is not nervous/anxious and does not have insomnia. Allergies - reviewed:    Allergies   Allergen Reactions    Sulfa (Sulfonamide Antibiotics) Hives       Medications - reviewed:   Current Outpatient Prescriptions   Medication Sig    metroNIDAZOLE (FLAGYL) 500 mg tablet Take 1 Tab by mouth two (2) times a day for 7 days.  [START ON 5/21/2018] fluconazole (DIFLUCAN) 150 mg tablet Take 1 Tab by mouth daily for 1 day. FDA advises cautious prescribing of oral fluconazole in pregnancy.  dicyclomine (BENTYL) 10 mg capsule Take 1 Cap by mouth three (3) times daily as needed.  raNITIdine (ZANTAC) 150 mg tablet Take 150 mg by mouth daily.  ibuprofen (MOTRIN) 800 mg tablet Take 1 Tab by mouth every eight (8) hours as needed for Pain.  cholecalciferol (VITAMIN D3) 1,000 unit tablet Take 1 Tab by mouth daily.  PSEUDOEPHEDRINE-guaiFENesin (MUCINEX D MAXIMUM STRENGTH) Tb12 extended release tablet Take 1 Tab by mouth.  fluticasone (FLONASE) 50 mcg/actuation nasal spray 2 sprays per nostril once daily     No current facility-administered medications for this visit. Past Medical History - reviewed:  Past Medical History:   Diagnosis Date    Headache(784.0)        Family Medical History - reviewed:  Family History   Problem Relation Age of Onset    Hypertension Mother        Objective  Visit Vitals    /81    Pulse 80    Temp 99.8 °F (37.7 °C) (Oral)    Resp 16    Ht 5' 3\" (1.6 m)    Wt 145 lb (65.8 kg)    SpO2 96%    BMI 25.69 kg/m2     Body mass index is 25.69 kg/(m^2). Nursing note and vitals reviewed. The following vital sign(s) noted to be abnormal: None    Physical Exam  Constitutional: Well-developed, well-nourished, and in no distress. Cardiovascular: Normal rate, regular rhythm, normal heart sounds and intact distal pulses. Exam reveals no gallop and no friction rub. No murmur heard. Pulmonary/Chest: Effort normal and breath sounds normal. No respiratory distress. No wheezes, no rales, no tenderness. Abdominal: Soft. Bowel sounds are normal. No distension and no mass. There is no tenderness. There is no rebound and no guarding. Skin: Skin is warm and dry.  No rash noted. Not diaphoretic. No erythema. No pallor. Psychiatric: Mood, memory, affect and judgment normal.  Pelvic Exam: Normal external genitalia. Closed cervix. White frothy discharge from cervical canal with mild odor. Non tender. Assessment / Plan   Ms. Samuel Fajardo is a 32 y.o. female with the following medical condition(s):    1. Vaginal discharge likely due to Bacterial vaginosis  Patent with Hx of prior BV infections with similar presentation. Will start treatment and confirm with NuSwab. - fluconazole (DIFLUCAN) 150 mg tablet; Take 1 Tab by mouth daily for 1 day. FDA advises cautious prescribing of oral fluconazole in pregnancy. Dispense: 1 Tab; Refill: 0  - NUSWAB VAGINITIS PLUS  - metroNIDAZOLE (FLAGYL) 500 mg tablet; Take 1 Tab by mouth two (2) times a day for 7 days. Dispense: 14 Tab; Refill: 0    3. Vitamin D deficiency  - VITAMIN D, 25 HYDROXY; Future    Patient was advised to return to clinic in case of worsening of symptoms or fail to improve. Life threatening signs and symptoms were discussed and patient advised to go to the nearest ED for prompt evaluation and care in case of onset of any of these. Follow-up Disposition:  Return if symptoms worsen or fail to improve. · I have discussed the diagnosis with the patient and the intended plan as seen in the above orders. The patient has received an after-visit summary and questions were answered concerning future plans. I have discussed medication side effects and warnings with the patient as well.       Patient/Plan discussed with Dr. Kalvin Rubinstein (Attending Physician)      Jefe Snowden MD  PGY-2 Family Medicine Resident  Encounter Date: 5/14/2018

## 2018-05-14 NOTE — MR AVS SNAPSHOT
[facility] [address] [facility] 
[phone] Patient: Kike Lucio MRN: [id number] MUU:[id number] Visit Information Date & Time Provider Department Dept. Phone Encounter #  
 5/14/2018  4:45 PM Marlon Halsted, Nena Mendieta Polo [phone] [id number] Follow-up Instructions Return if symptoms worsen or fail to improve. Your Appointments 5/21/2018  4:00 PM  
Complete Physical with Marlon Halsted, MD  
[address]) Appt Note: Complete physical  
 [address] [facility],[place] [address] [facility]  
[phone]  
  
   
 [address] [place] [address] 3 [facility] 99 [id number] Upcoming Health Maintenance Date Due Influenza Age 5 to Adult 8/1/2018 PAP AKA CERVICAL CYTOLOGY 4/3/2020 DTaP/Tdap/Td series (6 - Td) 6/27/2022 Allergies as of 5/14/2018  Review Complete On: 5/14/2018 By: Pily Joaquin LPN Severity Noted Reaction Type Reactions Sulfa (Sulfonamide Antibiotics)  07/09/2012    Hives Current Immunizations  Reviewed on 9/29/2017 Name Date DTAP Vaccine 9/13/1992, 3/28/1990, 1988, 1988 HPV (Quad) 6/6/2014, 8/19/2013 Hep A Vaccine (Adult) 6/6/2014, 8/19/2013 Hepatitis B Vaccine 1/17/2013, 11/29/2012, 6/27/2012 IPV 9/13/1992, 3/28/1990, 3/8/1989, 1988, 1988 Influenza Vaccine 10/3/2016, 10/12/2013, 1/8/2013 Influenza Vaccine (Quad) PF 9/29/2017 MMR Vaccine 9/13/1992, 11/30/1989 TB Skin Test (PPD) Intradermal 12/15/2015 11:59 AM, 6/19/2013 11:33 AM  
 TDAP Vaccine 6/27/2012 Not reviewed this visit You Were Diagnosed With   
  
 Codes Comments Vaginal discharge    -  Primary ICD-10-CM: N89.8 ICD-9-CM: 623.5 Vitamin D deficiency     ICD-10-CM: E55.9 ICD-9-CM: 268.9 Bacterial vaginosis     ICD-10-CM: N76.0, B96.89 
ICD-9-CM: 616.10, 041.9 Vitals BP Pulse Temp Resp Height(growth percentile) Weight(growth percentile) 134/81 80 99.8 °F (37.7 °C) (Oral) 16 5' 3\" (1.6 m) 145 lb (65.8 kg) SpO2 BMI OB Status Smoking Status 96% 25.69 kg/m2 Implant Never Smoker Vitals History BMI and BSA Data Body Mass Index Body Surface Area  
 25.69 kg/m 2 1.71 m 2 [facility] [address] [facility] [phone] Your Updated Medication List  
  
   
This list is accurate as of 5/14/18  5:19 PM.  Always use your most recent med list.  
  
  
  
  
 cholecalciferol 1,000 unit tablet Commonly known as:  VITAMIN D3 Take 1 Tab by mouth daily. dicyclomine 10 mg capsule Commonly known as:  BENTYL Take 1 Cap by mouth three (3) times daily as needed. fluconazole 150 mg tablet Commonly known as:  DIFLUCAN Take 1 Tab by mouth daily for 1 day. FDA advises cautious prescribing of oral fluconazole in pregnancy. Start taking on:  5/21/2018  
  
 fluticasone 50 mcg/actuation nasal spray Commonly known as:  FLONASE  
2 sprays per nostril once daily  
  
 ibuprofen 800 mg tablet Commonly known as:  MOTRIN Take 1 Tab by mouth every eight (8) hours as needed for Pain. metroNIDAZOLE 500 mg tablet Commonly known as:  FLAGYL Take 1 Tab by mouth two (2) times a day for 7 days. MUCINEX D MAXIMUM STRENGTH Tb12 extended release tablet Generic drug:  PSEUDOEPHEDRINE-guaiFENesin Take 1 Tab by mouth. raNITIdine 150 mg tablet Commonly known as:  ZANTAC Take 150 mg by mouth daily. Prescriptions Sent to Pharmacy Refills  
 metroNIDAZOLE (FLAGYL) 500 mg tablet 0 Sig: Take 1 Tab by mouth two (2) times a day for 7 days. Class: Normal  
 [facility] [address] [facility] [facility] Ph #: [phone]  Route: Oral  
 fluconazole (DIFLUCAN) 150 mg tablet 0  
 Starting on: 5/21/2018 Sig: Take 1 Tab by mouth daily for 1 day. FDA advises cautious prescribing of oral fluconazole in pregnancy. Class: Normal  
 Pharmacy: [address] [facility] Ph #: [phone] Route: Oral  
  
We Performed the Following [address] [id number] Custom] Follow-up Instructions Return if symptoms worsen or fail to improve. To-Do List   
 05/14/2018 Lab:  VITAMIN D, 25 HYDROXY Patient Instructions Bacterial Vaginosis: Care Instructions Your Care Instructions Bacterial vaginosis is a type of vaginal infection. It is caused by excess growth of certain bacteria that are normally found in the vagina. Symptoms can include itching, swelling, pain when you urinate or have sex, and a gray or yellow discharge with a \"fishy\" odor. It is not considered an infection that is spread through sexual contact. Although symptoms can be annoying and uncomfortable, bacterial vaginosis does not usually cause other health problems. However, if you have it while you are pregnant, it can cause complications. While the infection may go away on its own, most doctors use antibiotics to treat it. You may have been prescribed pills or vaginal cream. With treatment, bacterial vaginosis usually clears up in 5 to 7 days. Follow-up care is a key part of your treatment and safety. Be sure to make and go to all appointments, and call your doctor if you are having problems. It's also a good idea to know your test results and keep a list of the medicines you take. How can you care for yourself at home? · Take your antibiotics as directed. Do not stop taking them just because you feel better. You need to take the full course of antibiotics. · Do not eat or drink anything that contains alcohol if you are taking metronidazole (Flagyl). · Keep using your medicine if you start your period.  Use pads instead of tampons while using a vaginal cream or suppository. Tampons can absorb the medicine. · Wear loose cotton clothing. Do not wear nylon and other materials that hold body heat and moisture close to the skin. · Do not scratch. Relieve itching with a cold pack or a cool bath. · Do not wash your vaginal area more than once a day. Use plain water or a mild, unscented soap. Do not douche. When should you call for help? Watch closely for changes in your health, and be sure to contact your doctor if: 
? · You have unexpected vaginal bleeding. ? · You have a fever. ? · You have new or increased pain in your vagina or pelvis. ? · You are not getting better after 1 week. ? · Your symptoms return after you finish the course of your medicine. Where can you learn more? Go to http://marilee-greta.info/. Loly Gill in the search box to learn more about \"Bacterial Vaginosis: Care Instructions. \" Current as of: October 13, 2016 Content Version: 11.4 © 5434-2716 Broadcastr. Care instructions adapted under license by AppPowerGroup (which disclaims liability or warranty for this information). If you have questions about a medical condition or this instruction, always ask your healthcare professional. [address] any warranty or liability for your use of this information. Introducing [facility]! Dear Magdalena Wong: Thank you for requesting a Playful [employer] account. Our records indicate that you already have an active Playful Data account. You can access your account anytime at [email or web address] Did you know that you can access your hospital and ER discharge instructions at any time in Playful Data? You can also review all of your test results from your hospital stay or ER visit. Additional Information If you have questions, please visit the Frequently Asked Questions section of the [email or web address] website at [email or web address] Remember, [email or web address] is NOT to be used for urgent needs. For medical emergencies, dial 911. Now available from your iPhone and Android! Please provide this summary of care documentation to your next provider. Your primary care clinician is listed as Sybil Schmidt. If you have any questions after today's visit, please call [phone].

## 2018-05-17 LAB
ATOPOBIUM VAGINAE: 7.7 LOG (CELLS/ML)
BV CATEGORY: ABNORMAL
C. ALBICANS, DNA: NOT DETECTED
C. GLABRATA, DNA: NOT DETECTED
C. PARAPSILOSIS, DNA: NOT DETECTED
C. TROPICALIS, DNA: NOT DETECTED
CHLAMYDIA TRACHOMATIS RNA, TMA: NOT DETECTED
GARDNERELLA VAGINALIS: >8 LOG (CELLS/ML)
LACTOBACILLUS SPECIES: DETECTED LOG (CELLS/ML)
MEGASPHAERA SPECIES: >8 LOG (CELLS/ML)
N.GONORRHOEAE RNA,TMA: NOT DETECTED
T VAGINALIS RNA,QL TMA: NOT DETECTED

## 2018-05-19 NOTE — PROGRESS NOTES
I saw and evaluated the patient, performing the key elements of the service. I discussed the findings, assessment and plan with the resident and agree with the resident's findings and plan as documented in the resident's note. I personally performed the pelvic examination c/w white frothy discharge. Empiric therapy with flagyl and diflucan as pt states she gets yeast vaginitis with abx.

## 2018-05-21 ENCOUNTER — OFFICE VISIT (OUTPATIENT)
Dept: FAMILY MEDICINE CLINIC | Age: 28
End: 2018-05-21

## 2018-05-21 ENCOUNTER — TELEPHONE (OUTPATIENT)
Dept: FAMILY MEDICINE CLINIC | Age: 28
End: 2018-05-21

## 2018-05-21 VITALS
RESPIRATION RATE: 16 BRPM | OXYGEN SATURATION: 96 % | TEMPERATURE: 98.8 F | DIASTOLIC BLOOD PRESSURE: 66 MMHG | SYSTOLIC BLOOD PRESSURE: 127 MMHG | HEIGHT: 63 IN | HEART RATE: 92 BPM | BODY MASS INDEX: 25.69 KG/M2 | WEIGHT: 145 LBS

## 2018-05-21 DIAGNOSIS — Z00.00 WELL WOMAN EXAM (NO GYNECOLOGICAL EXAM): Primary | ICD-10-CM

## 2018-05-21 NOTE — PROGRESS NOTES
Dante Zeng  32 y.o. female  1990  [address] [place] [address]  [id number]   [address]: Progress Note  Laina Lee MD       Encounter Date: 5/21/2018    Chief Complaint   Patient presents with    Complete Physical     History of Present Illness   Dante Zeng is a 32 y.o. female who presents to clinic today for school physical. No complaints today. Pap UTD 2017  Mammogram not indicated. Denies any menstrual complaints at this time. Nexplanon. Leysmiwj-ryvuzqerl-gng  twice weekly  Diet: improving. Has been changing her diet which is helping with the abdominal cramping. Immunizations are UTD. Relationship status is stable. Review of Systems   ROS  Negative except as mentioned in HPI    Vitals/Objective:     Vitals:    05/21/18 1551   BP: 127/66   Pulse: 92   Resp: 16   Temp: 98.8 °F (37.1 °C)   TempSrc: Oral   SpO2: 96%   Weight: 145 lb (65.8 kg)   Height: 5' 3\" (1.6 m)     Body mass index is 25.69 kg/(m^2). Physical Exam   Constitutional: She appears well-developed and well-nourished. No distress. HENT:   Head: Normocephalic and atraumatic. Right Ear: Tympanic membrane normal.   Left Ear: Tympanic membrane normal.   Nose: Nose normal.   Mouth/Throat: Uvula is midline, oropharynx is clear and moist and mucous membranes are normal.   Eyes: Pupils are equal, round, and reactive to light. Neck: Neck supple. No thyromegaly present. Cardiovascular: Normal rate, regular rhythm, normal heart sounds and intact distal pulses. Exam reveals no gallop and no friction rub. No murmur heard. Pulmonary/Chest: Effort normal and breath sounds normal. No respiratory distress. She has no wheezes. She has no rales. Right breast exhibits no inverted nipple, no mass, no nipple discharge, no skin change and no tenderness. Left breast exhibits no inverted nipple, no mass, no nipple discharge, no skin change and no tenderness.    walt fibrocystic changes present. Non tender. Abdominal: Soft. Bowel sounds are normal. She exhibits no distension. There is no tenderness. There is no rebound and no guarding. Lymphadenopathy:     She has no cervical adenopathy. Skin: She is not diaphoretic. No results found for this or any previous visit (from the past 24 hour(s)). Assessment and Plan:   1. Well woman exam (no gynecological exam)  Routine labs. Form completed for school. RTC prn. Pelvic deferred as pap UTD and pelvic performed on last visit. - CBC W/O DIFF  - TSH 3RD GENERATION  - LIPID PANEL  - METABOLIC PANEL, COMPREHENSIVE  - CBC W/O DIFF  - TSH 3RD GENERATION  - LIPID PANEL  - METABOLIC PANEL, COMPREHENSIVE      I have discussed the diagnosis with the patient and the intended plan as seen in the above orders. she has expressed understanding. The patient has received an after-visit summary and questions were answered concerning future plans. I have discussed medication side effects and warnings with the patient as well. Follow-up Disposition: Not on File    Electronically Signed: Beryl Lake MD     History   Patients past medical, surgical and family histories were reviewed and updated. Past Medical History:   Diagnosis Date    Headache(784.0)      Past Surgical History:   Procedure Laterality Date    HX WISDOM TEETH EXTRACTION  08/14/2015     Family History   Problem Relation Age of Onset    Hypertension Mother      Social History     Social History    Marital status: SINGLE     Spouse name: N/A    Number of children: N/A    Years of education: N/A     Occupational History    Not on file.      Social History Main Topics    Smoking status: Never Smoker    Smokeless tobacco: Never Used    Alcohol use Yes      Comment: occasionally    Drug use: No    Sexual activity: Yes     Partners: Male     Birth control/ protection: Implant     Other Topics Concern    Not on file     Social History Narrative Current Medications/Allergies     Current Outpatient Prescriptions   Medication Sig Dispense Refill    metroNIDAZOLE (FLAGYL) 500 mg tablet Take 1 Tab by mouth two (2) times a day for 7 days. 14 Tab 0    fluconazole (DIFLUCAN) 150 mg tablet Take 1 Tab by mouth daily for 1 day. FDA advises cautious prescribing of oral fluconazole in pregnancy. 1 Tab 0    ibuprofen (MOTRIN) 800 mg tablet Take 1 Tab by mouth every eight (8) hours as needed for Pain. 30 Tab 0    PSEUDOEPHEDRINE-guaiFENesin (MUCINEX D MAXIMUM STRENGTH) Tb12 extended release tablet Take 1 Tab by mouth.  fluticasone (FLONASE) 50 mcg/actuation nasal spray 2 sprays per nostril once daily 1 Bottle 1    dicyclomine (BENTYL) 10 mg capsule Take 1 Cap by mouth three (3) times daily as needed. 90 Cap 0    raNITIdine (ZANTAC) 150 mg tablet Take 150 mg by mouth daily.  cholecalciferol (VITAMIN D3) 1,000 unit tablet Take 1 Tab by mouth daily.  90 Tab 4     Allergies   Allergen Reactions    Sulfa (Sulfonamide Antibiotics) Hives

## 2018-05-21 NOTE — TELEPHONE ENCOUNTER
----- Message from Kristen Russo sent at 5/21/2018  7:11 AM EDT -----  Regarding: Dr. Jennifer Wheeler  The patient is requesting a call back from the nurse to see about rescheduling her physical appointment.  (c)[phone]

## 2018-05-21 NOTE — PATIENT INSTRUCTIONS

## 2018-05-21 NOTE — PROGRESS NOTES
Chief Complaint   Patient presents with    Complete Physical     1. Have you been to the ER, urgent care clinic since your last visit? Hospitalized since your last visit? No    2. Have you seen or consulted any other health care providers outside of the [address] [place] [address] since your last visit? Include any pap smears or colon screening.  No

## 2018-05-21 NOTE — MR AVS SNAPSHOT
[address] [phone] Patient: Trisha Lira MRN: [id number] DCT:[birth year] Visit Information Date & Time Provider Department Dept. Phone Encounter #  
 5/21/2018  4:00 PM Sherly Clay, [address] [place] [address] [phone] [id number] Follow-up Instructions Return if symptoms worsen or fail to improve. Upcoming Health Maintenance Date Due Influenza Age 5 to Adult 8/1/2018 PAP AKA CERVICAL CYTOLOGY 4/3/2020 DTaP/Tdap/Td series (6 - Td) 6/27/2022 Allergies as of 5/21/2018  Review Complete On: 5/21/2018 By: Sherly Clay MD  
  
 Severity Noted Reaction Type Reactions Sulfa (Sulfonamide Antibiotics)  07/09/2012    Hives Current Immunizations  Reviewed on 5/21/2018 Name Date DTAP Vaccine 9/13/1992, 3/28/1990, 1990, 1990 HPV (Quad) 6/6/2014, 8/19/2013 Hep A Vaccine (Adult) 6/6/2014, 8/19/2013 Hepatitis B Vaccine 1/17/2013, 11/29/2012, 6/27/2012 IPV 9/13/1992, 3/28/1990, 3/8/1989, 1988, 1988 Influenza Vaccine 10/3/2016, 10/12/2013, 1/8/2013 Influenza Vaccine (Quad) PF 9/29/2017 MMR Vaccine 9/13/1992, 11/30/1989 TB Skin Test (PPD) Intradermal 12/15/2015 11:59 AM, 6/19/2013 11:33 AM  
 TDAP Vaccine 6/27/2012 Reviewed by Elizabeth Cuellar LPN on 5/82/8341 at  4:43 PM  
You Were Diagnosed With   
  
 Codes Comments Well woman exam (no gynecological exam)    -  Primary ICD-10-CM: Z00.00 ICD-9-CM: V70.0 Vitals BP Pulse Temp Resp Height(growth percentile) Weight(growth percentile) 127/66 92 98.8 °F (37.1 °C) (Oral) 16 5' 3\" (1.6 m) 145 lb (65.8 kg) SpO2 BMI OB Status Smoking Status 96% 25.69 kg/m2 Implant Never Smoker Vitals History BMI and BSA Data Body Mass Index Body Surface Area  
 25.69 kg/m 2 1.71 m 2 Preferred Pharmacy Pharmacy Name Phone [facility] [phone] Your Updated Medication List  
  
   
This list is accurate as of 5/21/18  4:45 PM.  Always use your most recent med list.  
  
  
  
  
 cholecalciferol 1,000 unit tablet Commonly known as:  VITAMIN D3 Take 1 Tab by mouth daily. dicyclomine 10 mg capsule Commonly known as:  BENTYL Take 1 Cap by mouth three (3) times daily as needed. fluconazole 150 mg tablet Commonly known as:  DIFLUCAN Take 1 Tab by mouth daily for 1 day. FDA advises cautious prescribing of oral fluconazole in pregnancy. fluticasone 50 mcg/actuation nasal spray Commonly known as:  FLONASE  
2 sprays per nostril once daily  
  
 ibuprofen 800 mg tablet Commonly known as:  MOTRIN Take 1 Tab by mouth every eight (8) hours as needed for Pain. metroNIDAZOLE 500 mg tablet Commonly known as:  FLAGYL Take 1 Tab by mouth two (2) times a day for 7 days. MUCINEX D MAXIMUM STRENGTH Tb12 extended release tablet Generic drug:  PSEUDOEPHEDRINE-guaiFENesin Take 1 Tab by mouth. raNITIdine 150 mg tablet Commonly known as:  ZANTAC Take 150 mg by mouth daily. We Performed the Following CBC W/O DIFF [09733 CPT(R)] CBC W/O DIFF [55187 CPT(R)] LIPID PANEL [77014 CPT(R)] LIPID PANEL [56359 CPT(R)] METABOLIC PANEL, COMPREHENSIVE [91231 CPT(R)] METABOLIC PANEL, COMPREHENSIVE [68910 CPT(R)] TSH 3RD GENERATION [58983 CPT(R)] TSH 3RD GENERATION [83364 CPT(R)] Follow-up Instructions Return if symptoms worsen or fail to improve. Patient Instructions Well Visit, Ages 25 to 48: Care Instructions Your Care Instructions Physical exams can help you stay healthy. Your doctor has checked your overall health and may have suggested ways to take good care of yourself. He or she also may have recommended tests.  At home, you can help prevent illness with healthy eating, regular exercise, and other steps. Follow-up care is a key part of your treatment and safety. Be sure to make and go to all appointments, and call your doctor if you are having problems. It's also a good idea to know your test results and keep a list of the medicines you take. How can you care for yourself at home? · Reach and stay at a healthy weight. This will lower your risk for many problems, such as obesity, diabetes, heart disease, and high blood pressure. · Get at least 30 minutes of physical activity on most days of the week. Walking is a good choice. You also may want to do other activities, such as running, swimming, cycling, or playing tennis or team sports. Discuss any changes in your exercise program with your doctor. · Do not smoke or allow others to smoke around you. If you need help quitting, talk to your doctor about stop-smoking programs and medicines. These can increase your chances of quitting for good. · Talk to your doctor about whether you have any risk factors for sexually transmitted infections (STIs). Having one sex partner (who does not have STIs and does not have sex with anyone else) is a good way to avoid these infections. · Use birth control if you do not want to have children at this time. Talk with your doctor about the choices available and what might be best for you. · Protect your skin from too much sun. When you're outdoors from 10 a.m. to 4 p.m., stay in the shade or cover up with clothing and a hat with a wide brim. Wear sunglasses that block UV rays. Even when it's cloudy, put broad-spectrum sunscreen (SPF 30 or higher) on any exposed skin. · See a dentist one or two times a year for checkups and to have your teeth cleaned. · Wear a seat belt in the car. · Drink alcohol in moderation, if at all. That means no more than 2 drinks a day for men and 1 drink a day for women. Follow your doctor's advice about when to have certain tests. These tests can spot problems early. For everyone · Cholesterol. Have the fat (cholesterol) in your blood tested after age 21. Your doctor will tell you how often to have this done based on your age, family history, or other things that can increase your risk for heart disease. · Blood pressure. Have your blood pressure checked during a routine doctor visit. Your doctor will tell you how often to check your blood pressure based on your age, your blood pressure results, and other factors. · Vision. Talk with your doctor about how often to have a glaucoma test. 
· Diabetes. Ask your doctor whether you should have tests for diabetes. · Colon cancer. Have a test for colon cancer at age 48. You may have one of several tests. If you are younger than 48, you may need a test earlier if you have any risk factors. Risk factors include whether you already had a precancerous polyp removed from your colon or whether your parent, brother, sister, or child has had colon cancer. For women · Breast exam and mammogram. Talk to your doctor about when you should have a clinical breast exam and a mammogram. Medical experts differ on whether and how often women under 50 should have these tests. Your doctor can help you decide what is right for you. · Pap test and pelvic exam. Begin Pap tests at age 24. A Pap test is the best way to find cervical cancer. The test often is part of a pelvic exam. Ask how often to have this test. 
· Tests for sexually transmitted infections (STIs). Ask whether you should have tests for STIs. You may be at risk if you have sex with more than one person, especially if your partners do not wear condoms. For men · Tests for sexually transmitted infections (STIs). Ask whether you should have tests for STIs. You may be at risk if you have sex with more than one person, especially if you do not wear a condom. · Testicular cancer exam. Ask your doctor whether you should check your testicles regularly. · Prostate exam. Talk to your doctor about whether you should have a blood test (called a PSA test) for prostate cancer. Experts differ on whether and when men should have this test. Some experts suggest it if you are older than 39 and are -American or have a father or brother who got prostate cancer when he was younger than 72. When should you call for help? Watch closely for changes in your health, and be sure to contact your doctor if you have any problems or symptoms that concern you. Where can you learn more? Go to http://marilee-greta.info/. Enter P072 in the search box to learn more about \"Well Visit, Ages 25 to 48: Care Instructions. \" Current as of: May 12, 2017 Content Version: 11.4 © 3234-6567 [employer]. Care instructions adapted under license by [employer] (which disclaims liability or warranty for this information). If you have questions about a medical condition or this instruction, always ask your healthcare professional. [address] [address] any warranty or liability for your use of this information. Introducing [facility]! Dear Jumana Lea: Thank you for requesting a [employer] account. Our records indicate that you already have an active [employer] account. You can access your account anytime at [email or web address] Did you know that you can access your hospital and ER discharge instructions at any time in [employer]? You can also review all of your test results from your hospital stay or ER visit. Additional Information If you have questions, please visit the Frequently Asked Questions section of the [employer] website at [email or web address] Remember, [employer] is NOT to be used for urgent needs. For medical emergencies, dial 911. Now available from your iPhone and Android! Please provide this summary of care documentation to your next provider. Your primary care clinician is listed as Yovani Novoa. If you have any questions after today's visit, please call [phone].

## 2018-05-21 NOTE — LETTER
Name: Richie Fields   Sex: female   : 1990  
[address] [address] 
[phone] (home) Current Immunizations: 
Immunization History Administered Date(s) Administered  DTAP Vaccine 1988, 1988, 1990, 1992  HPV (Quad) 2013, 2014  Hep A Vaccine (Adult) 2013, 2014  Hepatitis B Vaccine 2012, 2012, 2013  IPV 1988, 1988, 1989, 1990, 1992  Influenza Vaccine 2013, 10/12/2013, 10/03/2016  Influenza Vaccine (Quad) PF 2017  MMR Vaccine 1989, 1992  TB Skin Test (PPD) Intradermal 2013, 12/15/2015  TDAP Vaccine 2012 Allergies: Allergies as of 2018 - Review Complete 2018 Allergen Reaction Noted  Sulfa (sulfonamide antibiotics) Hives 2012

## 2018-05-22 DIAGNOSIS — R79.89 LOW TSH LEVEL: ICD-10-CM

## 2018-05-22 DIAGNOSIS — R73.01 IFG (IMPAIRED FASTING GLUCOSE): Primary | ICD-10-CM

## 2018-05-22 LAB
25(OH)D3 SERPL-MCNC: 24 NG/ML (ref 30–100)
ALB/GLOBRATIO, 58C: 1.8 (CALC) (ref 1–2.5)
ALBUMIN SERPL-MCNC: 4.4 G/DL (ref 3.6–5.1)
ALP SERPL-CCNC: 60 U/L (ref 33–115)
ALT SERPL-CCNC: 10 U/L (ref 6–29)
AST SERPL W P-5'-P-CCNC: 15 U/L (ref 10–30)
BILIRUB SERPL-MCNC: 0.5 MG/DL (ref 0.2–1.2)
BUN SERPL-MCNC: 13 MG/DL (ref 7–25)
BUN/CREATININE RATIO,BUCR: ABNORMAL (CALC) (ref 6–22)
CALCIUM SERPL-MCNC: 9.4 MG/DL (ref 8.6–10.2)
CHLORIDE SERPL-SCNC: 106 MMOL/L (ref 98–110)
CHOL/HDL RATIO,CHHDX: 3 (CALC)
CHOLEST SERPL-MCNC: 121 MG/DL
CO2 SERPL-SCNC: 25 MMOL/L (ref 20–31)
CREAT SERPL-MCNC: 0.81 MG/DL (ref 0.5–1.1)
ERYTHROCYTE [DISTWIDTH] IN BLOOD BY AUTOMATED COUNT: 12.2 % (ref 11–15)
GLOBULIN,GLOB: 2.5 G/DL (CALC) (ref 1.9–3.7)
GLUCOSE SERPL-MCNC: 124 MG/DL (ref 65–99)
HCT VFR BLD AUTO: 40.9 % (ref 35–45)
HDLC SERPL-MCNC: 40 MG/DL
HGB BLD-MCNC: 13.8 G/DL (ref 11.7–15.5)
LDL-CHOLESTEROL: 69 MG/DL (CALC)
MCH RBC QN AUTO: 28.6 PG (ref 27–33)
MCHC RBC AUTO-ENTMCNC: 33.7 G/DL (ref 32–36)
MCV RBC AUTO: 84.9 FL (ref 80–100)
NON-HDL CHOLESTEROL, 011976: 81 MG/DL (CALC)
PLATELET # BLD AUTO: 378 THOUSAND/UL (ref 140–400)
PMV BLD AUTO: 10 FL (ref 7.5–12.5)
POTASSIUM SERPL-SCNC: 3.7 MMOL/L (ref 3.5–5.3)
PROT SERPL-MCNC: 6.9 G/DL (ref 6.1–8.1)
RBC # BLD AUTO: 4.82 MILLION/UL (ref 3.8–5.1)
SODIUM SERPL-SCNC: 140 MMOL/L (ref 135–146)
TRIGL SERPL-MCNC: 42 MG/DL (ref ?–150)
TSH SERPL DL<=0.005 MIU/L-ACNC: 0.21 MIU/L
WBC # BLD AUTO: 6.6 THOUSAND/UL (ref 3.8–10.8)

## 2018-05-22 NOTE — PROGRESS NOTES
Call: all of your labs are normal except your thyroid and your glucose. Please return for some additional labs. You will need to be fasting and one test takes 2 hours. Follow up will be determined from these results.

## 2018-05-23 ENCOUNTER — TELEPHONE (OUTPATIENT)
Dept: FAMILY MEDICINE CLINIC | Age: 28
End: 2018-05-23

## 2018-05-23 NOTE — TELEPHONE ENCOUNTER
Attempted to call patient per Dr. Antonio Kumari about lab results. Left voicemail for patient to return call.

## 2018-05-23 NOTE — TELEPHONE ENCOUNTER
Dr. Abdirizak Blas  Received: Today       Tyler Parrish [facility] Front Office                     Pt returning a missed call.  Best contact [phone]

## 2018-05-23 NOTE — TELEPHONE ENCOUNTER
Notified patient of lab results per Dr. Mandie Coleman. Notified patient per Dr. Mandie Coleman lab results are normal except thyroid and glucose. Notified patient per Dr. Mandie Coleman additional labs have been ordered. Notified patient per Dr. Mandie Coleman she will need to be fasting. Advised patient to schedule a lab appointment. Lab appointment scheduled for 6/5/18 at 8:00am.Patient verbalized understanding.

## 2018-05-23 NOTE — TELEPHONE ENCOUNTER
Spoke with patient who had questions about most recent labs she had done. Answered any questions or concerns regarding lab results. Patient verbalized understanding.

## 2018-06-04 DIAGNOSIS — E55.9 VITAMIN D DEFICIENCY: ICD-10-CM

## 2018-06-04 DIAGNOSIS — R79.89 LOW TSH LEVEL: ICD-10-CM

## 2018-06-04 DIAGNOSIS — R73.01 IFG (IMPAIRED FASTING GLUCOSE): ICD-10-CM

## 2018-06-05 ENCOUNTER — DOCUMENTATION ONLY (OUTPATIENT)
Dept: FAMILY MEDICINE CLINIC | Age: 28
End: 2018-06-05

## 2018-06-07 LAB
25(OH)D3 SERPL-MCNC: 32 NG/ML (ref 30–100)
DEPRECATED FTI SERPL-MCNC: 2 UG/DL (ref 1.4–3.8)
EAG (MG/DL),9916804: 94 (CALC)
EAG (MMOL/L),9916805: 5.2 (CALC)
GLUCOSE P FAST SERPL-MCNC: 85 MG/DL (ref 65–99)
HBA1C MFR BLD HPLC: 4.9 % OF TOTAL HGB
Lab: 79 MG/DL (ref 65–139)
Lab: <6 %
Lab: NORMAL
T3 UPTAKE,T3U: 28 % (ref 22–35)
T4 SERPL-MCNC: 7.2 MCG/DL (ref 4.5–12)
THYROID PEROXIDASE (TPO) AB, 006677: 1 IU/ML
TSH SERPL DL<=0.005 MIU/L-ACNC: 0.53 MIU/L

## 2018-06-08 ENCOUNTER — TELEPHONE (OUTPATIENT)
Dept: FAMILY MEDICINE CLINIC | Age: 28
End: 2018-06-08

## 2018-06-08 NOTE — PROGRESS NOTES
Call and letter: all of your labs are normal.  Your thyroid level was repeated and was also normal!!. Keep up the great work  Return in one year! Have a great start to the summer.

## 2018-06-08 NOTE — TELEPHONE ENCOUNTER
Spoke with patient. Discussed that her labs were normal. She was concerned for her fatigue. Vitamin D resumed and has returned normal. Discussed that she may take over the counter vitamin B12 if she wants it to help. Return prn. All questions asked and answered.

## 2018-07-16 ENCOUNTER — TELEPHONE (OUTPATIENT)
Dept: FAMILY MEDICINE CLINIC | Age: 28
End: 2018-07-16

## 2018-07-16 NOTE — TELEPHONE ENCOUNTER
[phone]  Patient called to speak with nurse and would not leave any message as stating only that the nurse would already know what it is about.

## 2018-07-17 RX ORDER — METRONIDAZOLE 500 MG/1
500 TABLET ORAL 2 TIMES DAILY
Qty: 14 TAB | Refills: 0 | Status: SHIPPED | OUTPATIENT
Start: 2018-07-17 | End: 2018-07-24

## 2018-07-17 NOTE — TELEPHONE ENCOUNTER
Pt with hx of recurrent BV. Recently checked for secondary infections all of which are negative. Empiric therapy sent to patient. Will be informed by RN.

## 2018-07-17 NOTE — TELEPHONE ENCOUNTER
Spoke with patient who states she is having vaginal discharge with odor for a week. Patient states she has been using a new soap which she thinks is causing her symptoms. Patient is requesting to have a antibiotic sent to pharmacy. Patient is requesting to speak with physician.

## 2019-02-17 NOTE — PROGRESS NOTES
Chief Complaint   Patient presents with    Vaginal Discharge     thick white discharge with a slight odor  X 1 week      1. Have you been to the ER, urgent care clinic since your last visit? Hospitalized since your last visit? No    2. Have you seen or consulted any other health care providers outside of the [address] [place] [address] since your last visit? Include any pap smears or colon screening.  No The patient is a 11y Female complaining of syncope.

## 2019-02-21 ENCOUNTER — OFFICE VISIT (OUTPATIENT)
Dept: FAMILY MEDICINE CLINIC | Age: 29
End: 2019-02-21

## 2019-02-21 VITALS
TEMPERATURE: 98.2 F | RESPIRATION RATE: 16 BRPM | SYSTOLIC BLOOD PRESSURE: 136 MMHG | HEART RATE: 78 BPM | DIASTOLIC BLOOD PRESSURE: 76 MMHG | WEIGHT: 131 LBS | BODY MASS INDEX: 23.21 KG/M2 | OXYGEN SATURATION: 99 % | HEIGHT: 63 IN

## 2019-02-21 DIAGNOSIS — N76.0 ACUTE VAGINITIS: Primary | ICD-10-CM

## 2019-02-21 LAB — WET MOUNT POCT, WMPOCT: NORMAL

## 2019-02-21 RX ORDER — METRONIDAZOLE 500 MG/1
500 TABLET ORAL 2 TIMES DAILY
Qty: 14 TAB | Refills: 0 | Status: SHIPPED | OUTPATIENT
Start: 2019-02-21 | End: 2019-02-28

## 2019-02-21 RX ORDER — FLUCONAZOLE 150 MG/1
150 TABLET ORAL DAILY
Qty: 2 TAB | Refills: 0 | Status: SHIPPED | OUTPATIENT
Start: 2019-02-21 | End: 2019-02-23

## 2019-02-21 NOTE — PROGRESS NOTES
CC: vaginal pruritus  Subjective   Shelli Powell is an 25 y.o. female presents for  Vaginal pruritus. Patient presented with vaginal pruritus and irritation for the past four days which she attributes to using tampons during her most recent menstrual cycle. Patient is currently on nexplanon,  and since insertion menstrual periods have become scant and irregular. Her LMP was on 2/15, which started off light then got heavy so decided to use a tampon (which she used prior to nexplanon insertion). At this time she started presenting with vaginal itching, irritation and foul smelling discharge, so used monostat for a couple of days with no improvement. Patient denies any recent antibiotic use. Patient is currently in a monogamous relationship. She was diagnosed with chlamydia when she was 23 y.o but has never had an STD since. She is a [id number]    Review of Systems   Review of Systems   Constitutional: Negative for fever. Genitourinary: Negative for dysuria, frequency and urgency. Skin: Positive for itching. Negative for rash. Allergies   Allergies   Allergen Reactions    Sulfa (Sulfonamide Antibiotics) Hives       Medications  Current Outpatient Medications   Medication Sig    dicyclomine (BENTYL) 10 mg capsule Take 1 Cap by mouth three (3) times daily as needed.  raNITIdine (ZANTAC) 150 mg tablet Take 150 mg by mouth as needed.  ibuprofen (MOTRIN) 800 mg tablet Take 1 Tab by mouth every eight (8) hours as needed for Pain.  PSEUDOEPHEDRINE-guaiFENesin (MUCINEX D MAXIMUM STRENGTH) Tb12 extended release tablet Take 1 Tab by mouth.  fluticasone (FLONASE) 50 mcg/actuation nasal spray 2 sprays per nostril once daily    cholecalciferol (VITAMIN D3) 1,000 unit tablet Take 1 Tab by mouth daily. No current facility-administered medications for this visit.         Medical History  Past Medical History:   Diagnosis Date    Headache(784.0)        Immunizations   Immunization History   Administered Date(s) Administered    DTAP Vaccine 1990, 1990, 03/28/1990, 09/13/1992    HPV (Quad) 08/19/2013, 06/06/2014    Hep A Vaccine (Adult) 08/19/2013, 06/06/2014    Hepatitis B Vaccine 06/27/2012, 11/29/2012, 01/17/2013    IPV 1988, 1988, 03/08/1989, 03/28/1990, 09/13/1992    Influenza Vaccine 01/08/2013, 10/12/2013, 10/03/2016, 11/30/2018    Influenza Vaccine (Quad) PF 09/29/2017    MMR Vaccine 11/30/1989, 09/13/1992    TB Skin Test (PPD) Intradermal 06/19/2013, 12/15/2015    TDAP Vaccine 06/27/2012       Objective   Vital Signs  Visit Vitals  /76 (BP 1 Location: Right arm, BP Patient Position: Sitting)   Pulse 78   Temp 98.2 °F (36.8 °C) (Oral)   Resp 16   Ht 5' 3\" (1.6 m)   Wt 131 lb (59.4 kg)   SpO2 99%   BMI 23.21 kg/m²       Physical Exam  General appearance - Alert, NAD. Respiratory - LCTAB. No wheeze/rale/rhonchi  Heart - Normal rate, regular rhythm. No m/r/r  Abdomen - Soft, non tender. Non distended. Extremities - No LE edema. Distal pulses intact  Pelvic exam: VAGINA: normal appearing vagina with normal color and discharge, no lesions, CERVIX: normal appearing cervix. Whitish thin discharge evident discharge. WET MOUNT done - results: clue cells, exam chaperoned by RN April. Skin - normal coloration and normal turgor. No cyanosis, no rash. Zaria Dickens is a 25 y.o. who presents for vaginal pruritus likely secondary to bacterial vaginosis given white discharge, clue cells present and recent menstrual period with shift in vaginal coleen. Plan     1. Vaginitis likely secondary to Bacterial Vaginosis given thin discharge, clue cells on wet mount and recent menstrual period. Unlikely secondary to candida given no pseudohyphae or yeast buds were present and did not present with the characteristic discharge.   -Metronidazole 500 mg twice daily for seven days .     Follow-up Disposition: Not on File      I have discussed the aforementioned diagnoses and plan with the patient in detail. I have provided information in person and/or in AVS. All questions answered prior to discharge.       I discussed this patient with Dr. Yoselyn Gill (Attending Physician)   Signed By:  Stephanie Colin MD    Family Medicine Resident

## 2019-02-21 NOTE — PROGRESS NOTES
1. Have you been to the ER, urgent care clinic since your last visit? Hospitalized since your last visit? No    2. Have you seen or consulted any other health care providers outside of the [address] [place] [address] since your last visit? Include any pap smears or colon screening. No    Chief Complaint   Patient presents with    Vaginal Discharge    Vaginal Itching       Blood pressure 136/76, pulse 78, temperature 98.2 °F (36.8 °C), temperature source Oral, resp. rate 16, height 5' 3\" (1.6 m), weight 131 lb (59.4 kg), SpO2 99 %.

## 2019-02-21 NOTE — PATIENT INSTRUCTIONS
Bacterial Vaginosis: Care Instructions  Your Care Instructions    Bacterial vaginosis is a type of vaginal infection. It is caused by excess growth of certain bacteria that are normally found in the vagina. Symptoms can include itching, swelling, pain when you urinate or have sex, and a gray or yellow discharge with a \"fishy\" odor. It is not considered an infection that is spread through sexual contact. Although symptoms can be annoying and uncomfortable, bacterial vaginosis does not usually cause other health problems. However, if you have it while you are pregnant, it can cause complications. While the infection may go away on its own, most doctors use antibiotics to treat it. You may have been prescribed pills or vaginal cream. With treatment, bacterial vaginosis usually clears up in 5 to 7 days. Follow-up care is a key part of your treatment and safety. Be sure to make and go to all appointments, and call your doctor if you are having problems. It's also a good idea to know your test results and keep a list of the medicines you take. How can you care for yourself at home? · Take your antibiotics as directed. Do not stop taking them just because you feel better. You need to take the full course of antibiotics. · Do not eat or drink anything that contains alcohol if you are taking metronidazole (Flagyl). · Keep using your medicine if you start your period. Use pads instead of tampons while using a vaginal cream or suppository. Tampons can absorb the medicine. · Wear loose cotton clothing. Do not wear nylon and other materials that hold body heat and moisture close to the skin. · Do not scratch. Relieve itching with a cold pack or a cool bath. · Do not wash your vaginal area more than once a day. Use plain water or a mild, unscented soap. Do not douche. When should you call for help?   Watch closely for changes in your health, and be sure to contact your doctor if:    · You have unexpected vaginal bleeding.     · You have a fever.     · You have new or increased pain in your vagina or pelvis.     · You are not getting better after 1 week.     · Your symptoms return after you finish the course of your medicine. Where can you learn more? Go to http://marilee-greta.info/. Robert Castillo in the search box to learn more about \"Bacterial Vaginosis: Care Instructions. \"  Current as of: May 14, 2018  Content Version: 11.9  © 6308-7789 [employer]. Care instructions adapted under license by [employer] (which disclaims liability or warranty for this information). If you have questions about a medical condition or this instruction, always ask your healthcare professional. [address] any warranty or liability for your use of this information.

## 2019-03-13 ENCOUNTER — OFFICE VISIT (OUTPATIENT)
Dept: FAMILY MEDICINE CLINIC | Age: 29
End: 2019-03-13

## 2019-03-13 VITALS
SYSTOLIC BLOOD PRESSURE: 139 MMHG | DIASTOLIC BLOOD PRESSURE: 83 MMHG | BODY MASS INDEX: 22.79 KG/M2 | TEMPERATURE: 98.1 F | HEIGHT: 63 IN | RESPIRATION RATE: 16 BRPM | WEIGHT: 128.6 LBS | HEART RATE: 71 BPM | OXYGEN SATURATION: 98 %

## 2019-03-13 DIAGNOSIS — K21.9 GASTROESOPHAGEAL REFLUX DISEASE WITHOUT ESOPHAGITIS: ICD-10-CM

## 2019-03-13 DIAGNOSIS — K52.9 GASTROENTERITIS: ICD-10-CM

## 2019-03-13 DIAGNOSIS — K58.0 IRRITABLE BOWEL SYNDROME WITH DIARRHEA: ICD-10-CM

## 2019-03-13 DIAGNOSIS — R10.2 SUPRAPUBIC ABDOMINAL PAIN: ICD-10-CM

## 2019-03-13 DIAGNOSIS — R10.9 ABDOMINAL CRAMPING: Primary | ICD-10-CM

## 2019-03-13 PROBLEM — K58.9 IRRITABLE BOWEL: Status: ACTIVE | Noted: 2019-03-13

## 2019-03-13 LAB
BILIRUB UR QL STRIP: NEGATIVE
GLUCOSE UR-MCNC: NEGATIVE MG/DL
HCG URINE, QL. (POC): NEGATIVE
KETONES P FAST UR STRIP-MCNC: NEGATIVE MG/DL
PH UR STRIP: 6 [PH] (ref 4.6–8)
PROT UR QL STRIP: NEGATIVE
SP GR UR STRIP: 1.01 (ref 1–1.03)
UA UROBILINOGEN AMB POC: NORMAL (ref 0.2–1)
URINALYSIS CLARITY POC: NORMAL
URINALYSIS COLOR POC: YELLOW
URINE BLOOD POC: NORMAL
URINE LEUKOCYTES POC: NEGATIVE
URINE NITRITES POC: NEGATIVE
VALID INTERNAL CONTROL?: YES

## 2019-03-13 RX ORDER — RANITIDINE 150 MG/1
150 TABLET, FILM COATED ORAL AS NEEDED
Qty: 30 TAB | Refills: 3 | Status: SHIPPED | OUTPATIENT
Start: 2019-03-13 | End: 2021-06-03

## 2019-03-13 RX ORDER — ONDANSETRON 4 MG/1
4 TABLET, ORALLY DISINTEGRATING ORAL
Qty: 30 TAB | Refills: 0 | Status: SHIPPED | OUTPATIENT
Start: 2019-03-13 | End: 2019-12-09 | Stop reason: SDUPTHER

## 2019-03-13 RX ORDER — DICYCLOMINE HYDROCHLORIDE 10 MG/1
10 CAPSULE ORAL
Qty: 90 CAP | Refills: 0 | Status: SHIPPED | OUTPATIENT
Start: 2019-03-13 | End: 2022-08-31 | Stop reason: SDUPTHER

## 2019-03-13 NOTE — PROGRESS NOTES
[id number] [facility],    [address]   Affiliated with [facility] and Ven[facility] Note   Subjective:   Kamran Chappell is a 25 y.o. female presents for evaluation of abdominal pain  CC:\" I think I have an IBS flare\"  History provided by patient     HPI:  Pt  complains of acute sharp abdominal pain with 2 episodes of NBNB diarrhea with one episode of NBNBvomiting starting this morning. She has had bentyl in the past for similar symptoms in the past. She states she ate from a food truck and recently ate cheese burger from [employer] late last night . She noticed her IBS triggers in the past were beef, spicy/saucy foods. She also complains of symptoms of heartburn also starting this morning after vomiting. She used to have IBS flares 2 years ago, but resolved with changing her diet and exercise. Patient has had an implant,she does not have regular periods. Current Outpatient Medications on File Prior to Visit   Medication Sig Dispense Refill    ibuprofen (MOTRIN) 800 mg tablet Take 1 Tab by mouth every eight (8) hours as needed for Pain. 30 Tab 0    cholecalciferol (VITAMIN D3) 1,000 unit tablet Take 1 Tab by mouth daily. 90 Tab 4    PSEUDOEPHEDRINE-guaiFENesin (MUCINEX D MAXIMUM STRENGTH) Tb12 extended release tablet Take 1 Tab by mouth.  fluticasone (FLONASE) 50 mcg/actuation nasal spray 2 sprays per nostril once daily 1 Bottle 1     No current facility-administered medications on file prior to visit.         Past Medical History:   Diagnosis Date    Headache(784.0)        Social History     Socioeconomic History    Marital status: SINGLE     Spouse name: Not on file    Number of children: Not on file    Years of education: Not on file    Highest education level: Not on file   Social Needs    Financial resource strain: Not on file    Food insecurity - worry: Not on file    Food insecurity - inability: Not on file   [address] [place] [address] Transportation needs - medical: Not on file   [employer] needs - non-medical: Not on file   Occupational History    Not on file   Tobacco Use    Smoking status: Never Smoker    Smokeless tobacco: Never Used   Substance and Sexual Activity    Alcohol use: Yes     Comment: occasionally    Drug use: No    Sexual activity: Yes     Partners: Male     Birth control/protection: Implant   Other Topics Concern    Not on file   Social History Narrative    Not on file       Review of Systems   Constitutional: Negative for chills and fever. Respiratory: Negative for cough and hemoptysis. Cardiovascular: Negative for chest pain. Gastrointestinal: Positive for abdominal pain, diarrhea, heartburn, nausea and vomiting. Negative for blood in stool and melena. Genitourinary: Negative for dysuria. Musculoskeletal: Negative for myalgias. Skin: Negative for itching and rash. Neurological: Negative for dizziness, tingling, sensory change, focal weakness and headaches. Objective:     Visit Vitals  /83   Pulse 71   Temp 98.1 °F (36.7 °C) (Oral)   Resp 16   Ht 5' 3\" (1.6 m)   Wt 128 lb 9.6 oz (58.3 kg)   SpO2 98%   BMI 22.78 kg/m²      Physical Exam:  Physical Exam   Constitutional: She is oriented to person, place, and time. She appears well-developed and well-nourished. Neck: Neck supple. Cardiovascular: Normal rate and regular rhythm. Pulmonary/Chest: Effort normal. No respiratory distress. She has no wheezes. Abdominal: Soft. She exhibits no distension and no mass. There is tenderness. There is no rebound and no guarding. Suprapubic tenderness,No CVA tenderness    Musculoskeletal: She exhibits no edema. Neurological: She is alert and oriented to person, place, and time. Skin: Skin is warm. Nursing note and vitals reviewed. Assessment and orders:       ICD-10-CM ICD-9-CM    1. Abdominal cramping R10.9 789.00 AMB POC URINE PREGNANCY TEST, VISUAL COLOR COMPARISON   2. Gastroenteritis K52.9 558.9 AMB POC URINE PREGNANCY TEST, VISUAL COLOR COMPARISON      ondansetron (ZOFRAN ODT) 4 mg disintegrating tablet   3. Suprapubic abdominal pain R10.2 789.09 AMB POC URINALYSIS DIP STICK AUTO W/O MICRO   4. Irritable bowel syndrome with diarrhea K58.0 564.1 dicyclomine (BENTYL) 10 mg capsule   5. Gastroesophageal reflux disease without esophagitis K21.9 530.81 raNITIdine (ZANTAC) 150 mg tablet     Diagnoses and all orders for this visit:    1. Abdominal cramping  -     AMB POC URINE PREGNANCY TEST, VISUAL COLOR COMPARISON    2. Gastroenteritis  -     AMB POC URINE PREGNANCY TEST, VISUAL COLOR COMPARISON  -     ondansetron (ZOFRAN ODT) 4 mg disintegrating tablet; Take 1 Tab by mouth every eight (8) hours as needed for Nausea. 3. Suprapubic abdominal pain  -     AMB POC URINALYSIS DIP STICK AUTO W/O MICRO    4. Irritable bowel syndrome with diarrhea  -     dicyclomine (BENTYL) 10 mg capsule; Take 1 Cap by mouth three (3) times daily as needed for Pain. 5. Gastroesophageal reflux disease without esophagitis  -     raNITIdine (ZANTAC) 150 mg tablet; Take 1 Tab by mouth as needed for Indigestion. Counseled pt to stick to the [employer], do not use Bentyl for the next week, as Gastroenteritis vs food poisoning is high on the differential given acute symptoms and food association. Follow-up Disposition:  Return in about 1 week (around 3/20/2019) for if symptoms have not resolved, for abdominal pain . I have reviewed patient medical and social history and medications. I have reviewed pertinent labs results and other data. I have discussed the diagnosis with the patient and the intended plan as seen in the above orders. The patient has received an after-visit summary and questions were answered concerning future plans. I have discussed medication side effects and warnings with the patient as well.     Jessica Hansen MD  Resident [facility]  03/18/19

## 2019-03-13 NOTE — LETTER
NOTIFICATION RETURN TO WORK / SCHOOL 
 
3/13/2019 10:35 AM 
 
Ms. [address] [facility] [id number] To Whom It May Concern: 
 
Lana Xiong is currently under the care of [facility] [facility]. She will return to work/school on: Friday 3/15/2019 If there are questions or concerns please have the patient contact our office.  
 
 
 
Sincerely, 
 
 
Manjeet Sagastume MD

## 2019-03-13 NOTE — PROGRESS NOTES
Chief Complaint   Patient presents with    Abdominal Pain     x 2 days  patient states middle abdominal pain feels like cramping     Diarrhea     . Blood pressure 139/83, pulse 71, temperature 98.1 °F (36.7 °C), temperature source Oral, resp. rate 16, height 5' 3\" (1.6 m), weight 128 lb 9.6 oz (58.3 kg), SpO2 98 %. 1. Have you been to the ER, urgent care clinic since your last visit? Hospitalized since your last visit? No    2. Have you seen or consulted any other health care providers outside of the [address] [place] [address] since your last visit? Include any pap smears or colon screening.  No

## 2019-03-13 NOTE — PATIENT INSTRUCTIONS
she is advised to sip Gatorade mixed with 50% water frequently until she nausea, vomiting, and diarrhea have significantly improved. Immodium and if prescribed, antiemetics should be used for diarrhea and nausea respectively as directed. When symptoms have improved, the 'BRAT' diet is suggested (bananas, plain steamed rice, apple sauce and toast with jelly). she is advised to avoid all dairy products until all symptoms resolve. Call if bloody stools, persistent diarrhea, vomiting, fever or abdominal pain. she can progress to diet as tolerated as symptoms reed. Gastroenteritis: Care Instructions  Your Care Instructions    Gastroenteritis is an illness that may cause nausea, vomiting, and diarrhea. It is sometimes called \"stomach flu. \" It can be caused by bacteria or a virus. You will probably begin to feel better in 1 to 2 days. In the meantime, get plenty of rest and make sure you do not become dehydrated. Dehydration occurs when your body loses too much fluid. Follow-up care is a key part of your treatment and safety. Be sure to make and go to all appointments, and call your doctor if you are having problems. It's also a good idea to know your test results and keep a list of the medicines you take. How can you care for yourself at home? · If your doctor prescribed antibiotics, take them as directed. Do not stop taking them just because you feel better. You need to take the full course of antibiotics. · Drink plenty of fluids to prevent dehydration, enough so that your urine is light yellow or clear like water. Choose water and other caffeine-free clear liquids until you feel better. If you have kidney, heart, or liver disease and have to limit fluids, talk with your doctor before you increase your fluid intake. · Drink fluids slowly, in frequent, small amounts, because drinking too much too fast can cause vomiting.   · Begin eating mild foods, such as dry toast, yogurt, applesauce, bananas, and rice. Avoid spicy, hot, or high-fat foods, and do not drink alcohol or caffeine for a day or two. Do not drink milk or eat ice cream until you are feeling better. How to prevent gastroenteritis  · Keep hot foods hot and cold foods cold. · Do not eat meats, dressings, salads, or other foods that have been kept at room temperature for more than 2 hours. · Use a thermometer to check your refrigerator. It should be between 34°F and 40°F.  · Defrost meats in the refrigerator or microwave, not on the kitchen counter. · Keep your hands and your kitchen clean. Wash your hands, cutting boards, and countertops with hot soapy water frequently. · Cook meat until it is well done. · Do not eat raw eggs or uncooked sauces made with raw eggs. · Do not take chances. If food looks or tastes spoiled, throw it out. When should you call for help? Call 911 anytime you think you may need emergency care. For example, call if:    · You vomit blood or what looks like coffee grounds.     · You passed out (lost consciousness).     · You pass maroon or very bloody stools.    Call your doctor now or seek immediate medical care if:    · You have severe belly pain.     · You have signs of needing more fluids. You have sunken eyes, a dry mouth, and pass only a little dark urine.     · You feel like you are going to faint.     · You have increased belly pain that does not go away in 1 to 2 days.     · You have new or increased nausea, or you are vomiting.     · You have a new or higher fever.     · Your stools are black and tarlike or have streaks of blood.    Watch closely for changes in your health, and be sure to contact your doctor if:    · You are dizzy or lightheaded.     · You urinate less than usual, or your urine is dark yellow or brown.     · You do not feel better with each day that goes by. Where can you learn more? Go to http://kym.info/.   Enter N142 in the search box to learn more about \"Gastroenteritis: Care Instructions. \"  Current as of: July 30, 2018  Content Version: 11.9  © 8179-3015 [employer]. Care instructions adapted under license by [employer] (which disclaims liability or warranty for this information). If you have questions about a medical condition or this instruction, always ask your healthcare professional. [address] any warranty or liability for your use of this information. Abdominal Pain: Care Instructions  Your Care Instructions    Abdominal pain has many possible causes. Some aren't serious and get better on their own in a few days. Others need more testing and treatment. If your pain continues or gets worse, you need to be rechecked and may need more tests to find out what is wrong. You may need surgery to correct the problem. Don't ignore new symptoms, such as fever, nausea and vomiting, urination problems, pain that gets worse, and dizziness. These may be signs of a more serious problem. Your doctor may have recommended a follow-up visit in the next 8 to 12 hours. If you are not getting better, you may need more tests or treatment. The doctor has checked you carefully, but problems can develop later. If you notice any problems or new symptoms, get medical treatment right away. Follow-up care is a key part of your treatment and safety. Be sure to make and go to all appointments, and call your doctor if you are having problems. It's also a good idea to know your test results and keep a list of the medicines you take. How can you care for yourself at home? · Rest until you feel better. · To prevent dehydration, drink plenty of fluids, enough so that your urine is light yellow or clear like water. Choose water and other caffeine-free clear liquids until you feel better.  If you have kidney, heart, or liver disease and have to limit fluids, talk with your doctor before you increase the amount of fluids you drink.  · If your stomach is upset, eat mild foods, such as rice, dry toast or crackers, bananas, and applesauce. Try eating several small meals instead of two or three large ones. · Wait until 48 hours after all symptoms have gone away before you have spicy foods, alcohol, and drinks that contain caffeine. · Do not eat foods that are high in fat. · Avoid anti-inflammatory medicines such as aspirin, ibuprofen (Advil, Motrin), and naproxen (Aleve). These can cause stomach upset. Talk to your doctor if you take daily aspirin for another health problem. When should you call for help? Call 911 anytime you think you may need emergency care. For example, call if:    · You passed out (lost consciousness).     · You pass maroon or very bloody stools.     · You vomit blood or what looks like coffee grounds.     · You have new, severe belly pain.    Call your doctor now or seek immediate medical care if:    · Your pain gets worse, especially if it becomes focused in one area of your belly.     · You have a new or higher fever.     · Your stools are black and look like tar, or they have streaks of blood.     · You have unexpected vaginal bleeding.     · You have symptoms of a urinary tract infection. These may include:  ? Pain when you urinate. ? Urinating more often than usual.  ? Blood in your urine.     · You are dizzy or lightheaded, or you feel like you may faint.    Watch closely for changes in your health, and be sure to contact your doctor if:    · You are not getting better after 1 day (24 hours). Where can you learn more? Go to http://marilee-greta.info/. Enter T505 in the search box to learn more about \"Abdominal Pain: Care Instructions. \"  Current as of: September 23, 2018  Content Version: 11.9  © 8200-4718 [employer]. Care instructions adapted under license by [employer] (which disclaims liability or warranty for this information).  If you have questions about a medical condition or this instruction, always ask your healthcare professional. [place] [address] any warranty or liability for your use of this information.

## 2019-06-24 ENCOUNTER — OFFICE VISIT (OUTPATIENT)
Dept: FAMILY MEDICINE CLINIC | Age: 29
End: 2019-06-24

## 2019-06-24 VITALS
BODY MASS INDEX: 21.44 KG/M2 | TEMPERATURE: 98.5 F | RESPIRATION RATE: 16 BRPM | SYSTOLIC BLOOD PRESSURE: 131 MMHG | HEIGHT: 63 IN | DIASTOLIC BLOOD PRESSURE: 82 MMHG | HEART RATE: 77 BPM | WEIGHT: 121 LBS | OXYGEN SATURATION: 98 %

## 2019-06-24 DIAGNOSIS — M54.5 CHRONIC BILATERAL LOW BACK PAIN, WITH SCIATICA PRESENCE UNSPECIFIED: Primary | ICD-10-CM

## 2019-06-24 DIAGNOSIS — G89.29 CHRONIC BILATERAL LOW BACK PAIN, WITH SCIATICA PRESENCE UNSPECIFIED: Primary | ICD-10-CM

## 2019-06-24 RX ORDER — MELOXICAM 7.5 MG/1
7.5 TABLET ORAL DAILY
Qty: 14 TAB | Refills: 0 | Status: SHIPPED | OUTPATIENT
Start: 2019-06-24 | End: 2019-07-11 | Stop reason: SDUPTHER

## 2019-06-24 RX ORDER — METHOCARBAMOL 500 MG/1
500 TABLET, FILM COATED ORAL
Qty: 14 TAB | Refills: 0 | Status: SHIPPED | OUTPATIENT
Start: 2019-06-24 | End: 2019-07-11 | Stop reason: SDUPTHER
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
## 2019-06-26 ENCOUNTER — TELEPHONE (OUTPATIENT)
Dept: FAMILY MEDICINE CLINIC | Age: 29
End: 2019-06-26

## 2019-07-09 ENCOUNTER — HOSPITAL ENCOUNTER (OUTPATIENT)
Dept: PHYSICAL THERAPY | Age: 29
Discharge: HOME | End: 2019-07-09
Payer: OTHER GOVERNMENT

## 2019-07-09 PROCEDURE — 97162 PT EVAL MOD COMPLEX 30 MIN: CPT | Performed by: PHYSICAL THERAPIST

## 2019-07-09 PROCEDURE — 97112 NEUROMUSCULAR REEDUCATION: CPT | Performed by: PHYSICAL THERAPIST

## 2019-07-09 PROCEDURE — 97014 ELECTRIC STIMULATION THERAPY: CPT | Performed by: PHYSICAL THERAPIST

## 2019-07-09 NOTE — PROGRESS NOTES
[address] [facility] Flako Hemphill 55  Phone: [phone]  Fax: [phone]    Plan of Care/ Statement of Necessity for Physical Therapy Services 2-15    Patient name: aZhra Shearer  : 1990  Provider#: [id number]  Referral source: Pepe Epperson MD      Medical/Treatment Diagnosis: Low back pain [M54.5]     Prior Hospitalization: see medical history     Comorbidities: None  Prior Level of Function: see initial eval  Medications: Verified on Patient Summary List    Start of Care: 17      Onset Date:        The Plan of Care and following information is based on the information from the initial evaluation. Assessment/ key information: Patient presents with right-sided low back pain with an acute exacerbation two weeks ago. 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 Evaluation Complexity History MEDIUM  Complexity : 1-2 comorbidities / personal factors will impact the outcome/ POC ; Examination MEDIUM Complexity : 3 Standardized tests and measures addressing body structure, function, activity limitation and / or participation in recreation  ;Presentation MEDIUM Complexity : Evolving with changing characteristics  ; Clinical Decision Making MEDIUM Complexity : FOTO score of 26-74  Overall Complexity Rating: MEDIUM    Problem List: pain affecting function, decrease ROM, decrease strength, impaired gait/ balance, decrease ADL/ functional abilitiies, decrease activity tolerance, decrease flexibility/ joint mobility and decrease transfer abilities   Treatment Plan may include any combination of the following: Therapeutic exercise, Therapeutic activities, Neuromuscular re-education, Physical agent/modality, Gait/balance training, Manual therapy, Patient education, Self Care training, Functional mobility training, Home safety training and Stair training  Patient / Family readiness to learn indicated by: asking questions, trying to perform skills and interest  Persons(s) to be included in education: patient (P)  Barriers to Learning/Limitations: None  Patient Goal (s): I want to be able to work with less pain.   Patient Self Reported Health Status: excellent  Rehabilitation Potential: excellent    Short Term Goals: To be accomplished in 8 treatments:  1. Patient will be able to perform all bed mobility tasks, including sit->supine or rolling in either direction, with no pain or limitation. 2. Patient will be able to stand for 20 minutes with no pain or limitation. 3. Patient will be able to bend forward and tie her shoes with no pain or limitation. Long Term Goals: To be accomplished in 16 treatments:  1. Patient will be able to  40# from the floor with no pain or limitation. 2. Patient will be able to walk two blocks with no pain or limitation. 3. Patient will work an entire weeks worth of shifts with no pain or limitation. Frequency / Duration: Patient to be seen 2 times per week for 8 weeks. Patient/ Caregiver education and instruction: self care, activity modification and exercises    [x]  Plan of care has been reviewed with ARTEMIO Ma, PT , DPT, OCS, Cert. DN   7/9/2019     ________________________________________________________________________    I certify that the above Therapy Services are being furnished while the patient is under my care. I agree with the treatment plan and certify that this therapy is necessary.     [de-identified] Signature:____________________  Date:____________Time: _________
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
## 2019-07-11 ENCOUNTER — OFFICE VISIT (OUTPATIENT)
Dept: FAMILY MEDICINE CLINIC | Age: 29
End: 2019-07-11

## 2019-07-11 VITALS
DIASTOLIC BLOOD PRESSURE: 81 MMHG | OXYGEN SATURATION: 97 % | RESPIRATION RATE: 18 BRPM | BODY MASS INDEX: 23.21 KG/M2 | HEART RATE: 82 BPM | HEIGHT: 63 IN | SYSTOLIC BLOOD PRESSURE: 123 MMHG | TEMPERATURE: 98.6 F | WEIGHT: 131 LBS

## 2019-07-11 DIAGNOSIS — G89.29 CHRONIC BILATERAL LOW BACK PAIN WITHOUT SCIATICA: Primary | ICD-10-CM

## 2019-07-11 DIAGNOSIS — M25.511 ACUTE PAIN OF RIGHT SHOULDER: ICD-10-CM

## 2019-07-11 DIAGNOSIS — M54.50 CHRONIC BILATERAL LOW BACK PAIN WITHOUT SCIATICA: Primary | ICD-10-CM

## 2019-07-11 RX ORDER — METHOCARBAMOL 500 MG/1
500 TABLET, FILM COATED ORAL
Qty: 14 TAB | Refills: 0 | Status: SHIPPED | OUTPATIENT
Start: 2019-07-11 | End: 2021-06-03

## 2019-07-11 RX ORDER — MELOXICAM 7.5 MG/1
7.5 TABLET ORAL DAILY
Qty: 14 TAB | Refills: 0 | Status: SHIPPED | OUTPATIENT
Start: 2019-07-11 | End: 2021-06-03

## 2019-07-11 NOTE — LETTER
NOTIFICATION RETURN TO WORK / SCHOOL 
 
7/11/2019 10:13 AM 
 
[address] [facility] [id number] To Whom It May Concern: 
 
Della John is currently under the care of [facility]. The patient should be on light duty work until 7/29/19 as she recovers from a low back injury. No lifting greater than 15 lbs. 2 weeks for light duty If there are questions or concerns please have the patient contact our office. Sincerely, Max Nguyen MD
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
## 2019-07-11 NOTE — PATIENT INSTRUCTIONS
Shoulder Pain: Care Instructions  Your Care Instructions    You can hurt your shoulder by using it too much during an activity, such as fishing or baseball. It can also happen as part of the everyday wear and tear of getting older. Shoulder injuries can be slow to heal, but your shoulder should get better with time. Your doctor may recommend a sling to rest your shoulder. If you have injured your shoulder, you may need testing and treatment. Follow-up care is a key part of your treatment and safety. Be sure to make and go to all appointments, and call your doctor if you are having problems. It's also a good idea to know your test results and keep a list of the medicines you take. How can you care for yourself at home? · Take pain medicines exactly as directed. ? If the doctor gave you a prescription medicine for pain, take it as prescribed. ? If you are not taking a prescription pain medicine, ask your doctor if you can take an over-the-counter medicine. ? Do not take two or more pain medicines at the same time unless the doctor told you to. Many pain medicines contain acetaminophen, which is Tylenol. Too much acetaminophen (Tylenol) can be harmful. · If your doctor recommends that you wear a sling, use it as directed. Do not take it off before your doctor tells you to. · Put ice or a cold pack on the sore area for 10 to 20 minutes at a time. Put a thin cloth between the ice and your skin. · If there is no swelling, you can put moist heat, a heating pad, or a warm cloth on your shoulder. Some doctors suggest alternating between hot and cold. · Rest your shoulder for a few days. If your doctor recommends it, you can then begin gentle exercise of the shoulder, but do not lift anything heavy. When should you call for help? Call 911 anytime you think you may need emergency care. For example, call if:    · You have chest pain or pressure. This may occur with:  ? Sweating. ?  Shortness of breath. ? Nausea or vomiting. ? Pain that spreads from the chest to the neck, jaw, or one or both shoulders or arms. ? Dizziness or lightheadedness. ? A fast or uneven pulse. After calling 911, chew 1 adult-strength aspirin. Wait for an ambulance. Do not try to drive yourself.     · Your arm or hand is cool or pale or changes color.    Call your doctor now or seek immediate medical care if:    · You have signs of infection, such as:  ? Increased pain, swelling, warmth, or redness in your shoulder. ? Red streaks leading from a place on your shoulder. ? Pus draining from an area of your shoulder. ? Swollen lymph nodes in your neck, armpits, or groin. ? A fever.    Watch closely for changes in your health, and be sure to contact your doctor if:    · You cannot use your shoulder.     · Your shoulder does not get better as expected. Where can you learn more? Go to http://marilee-greta.info/. Enter B994 in the search box to learn more about \"Shoulder Pain: Care Instructions. \"  Current as of: September 20, 2018  Content Version: 11.9  © 0830-1785 [employer]. Care instructions adapted under license by "[employer]" (which disclaims liability or warranty for this information). If you have questions about a medical condition or this instruction, always ask your healthcare professional. [place] [address] any warranty or liability for your use of this information.

## 2019-07-11 NOTE — PROGRESS NOTES
1. Have you been to the ER, urgent care clinic since your last visit? Hospitalized since your last visit? No    2. Have you seen or consulted any other health care providers outside of the [address] [place] [address] since your last visit? Include any pap smears or colon screening. No    Chief Complaint   Patient presents with    Back Pain     follow up    Letter for School/Work     Patient requests extension of work note. Patient currently in PT. Blood pressure 123/81, pulse 82, temperature 98.6 °F (37 °C), temperature source Oral, resp. rate 18, height 5' 3\" (1.6 m), weight 131 lb (59.4 kg), SpO2 97 %.

## 2019-07-11 NOTE — LETTER
NOTIFICATION RETURN TO WORK / SCHOOL 
 
7/11/2019 9:49 AM 
 
[address] [facility] [id number] To Whom It May Concern: 
 
Abdulkadir Lorenzana is currently under the care of [facility]. 
  
The patient should be on light duty work until 7/22/19 as she recovers from a low back injury. 
  
No lifting greater than 15 lbs. 2 weeks for light duty 
  
If there are questions or concerns please have the patient contact our office. Sincerely, Lillie Aguilar MD
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
## 2019-07-14 PROBLEM — M54.50 CHRONIC BILATERAL LOW BACK PAIN WITHOUT SCIATICA: Status: ACTIVE | Noted: 2019-07-14

## 2019-07-14 PROBLEM — G89.29 CHRONIC BILATERAL LOW BACK PAIN WITHOUT SCIATICA: Status: ACTIVE | Noted: 2019-07-14

## 2019-07-18 ENCOUNTER — HOSPITAL ENCOUNTER (OUTPATIENT)
Dept: PHYSICAL THERAPY | Age: 29
Discharge: HOME | End: 2019-07-18
Payer: OTHER GOVERNMENT

## 2019-07-18 PROCEDURE — 97014 ELECTRIC STIMULATION THERAPY: CPT | Performed by: PHYSICAL THERAPIST

## 2019-07-18 PROCEDURE — 97112 NEUROMUSCULAR REEDUCATION: CPT | Performed by: PHYSICAL THERAPIST

## 2019-07-18 PROCEDURE — 97110 THERAPEUTIC EXERCISES: CPT | Performed by: PHYSICAL THERAPIST
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
## 2019-07-25 ENCOUNTER — APPOINTMENT (OUTPATIENT)
Dept: PHYSICAL THERAPY | Age: 29
End: 2019-07-25
Payer: OTHER GOVERNMENT

## 2019-08-08 ENCOUNTER — APPOINTMENT (OUTPATIENT)
Dept: PHYSICAL THERAPY | Age: 29
End: 2019-08-08
Payer: OTHER GOVERNMENT

## 2019-08-12 ENCOUNTER — HOSPITAL ENCOUNTER (OUTPATIENT)
Dept: PHYSICAL THERAPY | Age: 29
End: 2019-08-12
Payer: OTHER GOVERNMENT

## 2019-09-04 ENCOUNTER — HOSPITAL ENCOUNTER (OUTPATIENT)
Dept: PHYSICAL THERAPY | Age: 29
Discharge: HOME | End: 2019-09-04
Payer: OTHER GOVERNMENT

## 2019-09-04 PROCEDURE — 97014 ELECTRIC STIMULATION THERAPY: CPT | Performed by: PHYSICAL THERAPIST

## 2019-09-04 PROCEDURE — 97140 MANUAL THERAPY 1/> REGIONS: CPT | Performed by: PHYSICAL THERAPIST

## 2019-09-04 PROCEDURE — 97110 THERAPEUTIC EXERCISES: CPT | Performed by: PHYSICAL THERAPIST

## 2019-09-04 PROCEDURE — 97112 NEUROMUSCULAR REEDUCATION: CPT | Performed by: PHYSICAL THERAPIST

## 2019-09-04 NOTE — PROGRESS NOTES
PT DAILY TREATMENT NOTE 2-15    Patient Name: Xochilt Rubi  Date:2019  : 1990  [x]  Patient  Verified  Payor: Nicole Rendon / Plan: Fernanda Ramirez / Product Type: Commerical /    In time:10:00 AM  Out time:10:55 AM  Total Treatment Time (min): 55  Visit #:  3    Treatment Area: Low back pain [M54.5]    SUBJECTIVE  Pain Level (0-10 scale): 5/10  Any medication changes, allergies to medications, adverse drug reactions, diagnosis change, or new procedure performed?: [x] No    [] Yes (see summary sheet for update)  Subjective functional status/changes:   [] No changes reported  Patient returns to the clinic following a 6 week absence due to a schedule conflict. Her low back pain has improved significantly, with only occasional twinges of pain while at work. Her R shoulder continues to bother her significantly, with difficulty reaching across her body and behind her back.     OBJECTIVE           Modality rationale: decrease pain and increase tissue extensibility to improve the patients ability to bend forward without pain    Min Type Additional Details   15 [x] Estim: []Att   [x]Unatt        []TENS instruct                  [x]IFC  []Premod   []NMES                     []Other:  []w/US   [x]w/ice   []w/heat  Position: supine  Location: low back     []  Traction: [] Cervical       []Lumbar                       [] Prone          []Supine                       []Intermittent   []Continuous Lbs:  [] before manual  [] after manual  []w/heat     []  Ultrasound: []Continuous   [] Pulsed at:                            []1MHz   []3MHz Location:  W/cm2:     []  Paraffin         Location:  []w/heat     []  Ice     []  Heat  []  Ice massage Position:  Location:     []  Laser  []  Other: Position:  Location:     []  Vasopneumatic Device Pressure:       [] lo [] med [] hi   Temperature:    [x] Skin assessment post-treatment:  [x]intact []redness- no adverse reaction    []redness - adverse reaction:      15 min Neuromuscular Re-education:  [x]  See flow sheet :   Rationale: increase ROM, increase strength and improve coordination  to improve the patients ability to lift patients without pain    10 min Therapeutic exercises:  [x]  See flow sheet :   Rationale: increase ROM, increase strength and improve coordination  to improve the patients ability to lift patients without pain                                                                 15 min Manual Therapy:  Passive stretching into flexion, abduction, and ER  Manual resistance PNF D2 flexion/extension   Rationale: increase ROM, increase strength and improve coordination  to improve the patients ability to lift patients without pain                                                                                                                                     With   [] TE   [] TA   [] neuro   [] other: Patient Education: [x] Review HEP    [] Progressed/Changed HEP based on:   [] positioning   [] body mechanics   [] transfers   [] heat/ice application    [] other:      Other Objective/Functional Measures: Add Drop: (+) B  R HG IR: 45 degrees, no pain    Add Drop: (-) B at the end of session     Pain Level (0-10 scale) post treatment: 2    ASSESSMENT/Changes in Function:   Patient is showing improved tolerance to work tasks, such as transferring patients. Patient will continue to benefit from skilled PT services to modify and progress therapeutic interventions, address functional mobility deficits, address ROM deficits, address strength deficits, analyze and address soft tissue restrictions, analyze and cue movement patterns, analyze and modify body mechanics/ergonomics and assess and modify postural abnormalities to attain remaining goals.      []  See Plan of Care  []  See progress note/recertification  []  See Discharge Summary         Progress towards goals / Updated goals:  Patient is beginning to show steady progress towards long term goals for her low back and will transition PT to focus on the R shoulder and RITA concepts. PLAN  [x]  Upgrade activities as tolerated     [x]  Continue plan of care  []  Update interventions per flow sheet       []  Discharge due to:_  []  Other:_      Trevor Agustin, PT , DPT, OCS, Cert.  DN   9/4/2019

## 2019-09-12 ENCOUNTER — HOSPITAL ENCOUNTER (OUTPATIENT)
Dept: PHYSICAL THERAPY | Age: 29
Discharge: HOME | End: 2019-09-12
Payer: OTHER GOVERNMENT

## 2019-09-12 PROCEDURE — 97014 ELECTRIC STIMULATION THERAPY: CPT | Performed by: PHYSICAL THERAPIST

## 2019-09-12 PROCEDURE — 97140 MANUAL THERAPY 1/> REGIONS: CPT | Performed by: PHYSICAL THERAPIST

## 2019-09-12 PROCEDURE — 97112 NEUROMUSCULAR REEDUCATION: CPT | Performed by: PHYSICAL THERAPIST

## 2019-09-12 PROCEDURE — 97110 THERAPEUTIC EXERCISES: CPT | Performed by: PHYSICAL THERAPIST

## 2019-09-12 NOTE — PROGRESS NOTES
PT DAILY TREATMENT NOTE 2-15    Patient Name: Axel Hamilton  Date:2019  : 1990  [x]  Patient  Verified  Payor: Ashely Presley / Plan: Yohan Robins / Product Type: Commerical /    In time:10:00 AM  Out time:10:55 AM  Total Treatment Time (min): 55  Visit #:  4    Treatment Area: Low back pain [M54.5]    SUBJECTIVE  Pain Level (0-10 scale): 7/10  Any medication changes, allergies to medications, adverse drug reactions, diagnosis change, or new procedure performed?: [x] No    [] Yes (see summary sheet for update)  Subjective functional status/changes:   [] No changes reported  Patient reports no significant change in her overall pain since her last visit and continues to struggle completing work tasks.     OBJECTIVE           Modality rationale: decrease pain and increase tissue extensibility to improve the patients ability to bend forward without pain    Min Type Additional Details   15 [x] Estim: []Att   [x]Unatt        []TENS instruct                  [x]IFC  []Premod   []NMES                     []Other:  []w/US   [x]w/ice   []w/heat  Position: supine  Location: low back     []  Traction: [] Cervical       []Lumbar                       [] Prone          []Supine                       []Intermittent   []Continuous Lbs:  [] before manual  [] after manual  []w/heat     []  Ultrasound: []Continuous   [] Pulsed at:                            []1MHz   []3MHz Location:  W/cm2:     []  Paraffin         Location:  []w/heat     []  Ice     []  Heat  []  Ice massage Position:  Location:     []  Laser  []  Other: Position:  Location:     []  Vasopneumatic Device Pressure:       [] lo [] med [] hi   Temperature:    [x] Skin assessment post-treatment:  [x]intact []redness- no adverse reaction    []redness - adverse reaction:      15 min Neuromuscular Re-education:  [x]  See flow sheet :   Rationale: increase ROM, increase strength and improve coordination  to improve the patients ability to lift patients without pain    10 min Therapeutic exercises:  [x]  See flow sheet :   Rationale: increase ROM, increase strength and improve coordination  to improve the patients ability to lift patients without pain                                                                 15 min Manual Therapy:  Passive stretching into flexion, abduction, and ER  Manual resistance PNF D2 flexion/extension   Rationale: increase ROM, increase strength and improve coordination  to improve the patients ability to lift patients without pain                                                                                                                                     With   [] TE   [] TA   [] neuro   [] other: Patient Education: [x] Review HEP    [] Progressed/Changed HEP based on:   [] positioning   [] body mechanics   [] transfers   [] heat/ice application    [] other:      Other Objective/Functional Measures:   Significant muscle guarding observed during manual therapy. Add Drop: (-) B at the end of session     Pain Level (0-10 scale) post treatment: 6    ASSESSMENT/Changes in Function:   No change in ROM  Patient will continue to benefit from skilled PT services to modify and progress therapeutic interventions, address functional mobility deficits, address ROM deficits, address strength deficits, analyze and address soft tissue restrictions, analyze and cue movement patterns, analyze and modify body mechanics/ergonomics and assess and modify postural abnormalities to attain remaining goals. []  See Plan of Care  []  See progress note/recertification  []  See Discharge Summary         Progress towards goals / Updated goals:  Patient has not shown significant improvement in her shoulder and will potentially refer out should she not show any additional progress by next week.     PLAN  [x]  Upgrade activities as tolerated     [x]  Continue plan of care  []  Update interventions per flow sheet       []  Discharge due to:_  []  Other:_ Darlene Call, PT , DPT, OCS, Cert.  DN   9/12/2019

## 2019-09-19 ENCOUNTER — HOSPITAL ENCOUNTER (OUTPATIENT)
Dept: PHYSICAL THERAPY | Age: 29
End: 2019-09-19
Payer: OTHER GOVERNMENT

## 2019-12-09 ENCOUNTER — OFFICE VISIT (OUTPATIENT)
Dept: FAMILY MEDICINE CLINIC | Age: 29
End: 2019-12-09

## 2019-12-09 VITALS
TEMPERATURE: 98.1 F | HEIGHT: 63 IN | WEIGHT: 144 LBS | HEART RATE: 92 BPM | OXYGEN SATURATION: 99 % | RESPIRATION RATE: 16 BRPM | BODY MASS INDEX: 25.52 KG/M2 | DIASTOLIC BLOOD PRESSURE: 80 MMHG | SYSTOLIC BLOOD PRESSURE: 143 MMHG

## 2019-12-09 DIAGNOSIS — A05.9 FOOD POISONING: ICD-10-CM

## 2019-12-09 DIAGNOSIS — R11.2 NAUSEA AND VOMITING, INTRACTABILITY OF VOMITING NOT SPECIFIED, UNSPECIFIED VOMITING TYPE: ICD-10-CM

## 2019-12-09 DIAGNOSIS — K52.9 GASTROENTERITIS: Primary | ICD-10-CM

## 2019-12-09 DIAGNOSIS — R19.7 DIARRHEA, UNSPECIFIED TYPE: ICD-10-CM

## 2019-12-09 RX ORDER — ONDANSETRON 4 MG/1
4 TABLET, ORALLY DISINTEGRATING ORAL
Status: CANCELLED | OUTPATIENT
Start: 2019-12-09

## 2019-12-09 RX ORDER — DICYCLOMINE HYDROCHLORIDE 10 MG/1
10 CAPSULE ORAL 3 TIMES DAILY
Qty: 30 CAP | Refills: 0 | Status: SHIPPED | OUTPATIENT
Start: 2019-12-09 | End: 2021-06-03

## 2019-12-09 RX ORDER — ONDANSETRON 4 MG/1
4 TABLET, ORALLY DISINTEGRATING ORAL
Qty: 30 TAB | Refills: 0 | Status: SHIPPED | OUTPATIENT
Start: 2019-12-09

## 2019-12-09 NOTE — PROGRESS NOTES
Chief Complaint   Patient presents with    Vomiting     started yesterday   :    DELLA  January Dahl is a 30 y.o. female who presents for an acute onset of vomiting and diarrhea, 1 days ago. the context:has some lasagna and salad yesterday at 5pm; and symptoms started about couple hours later. Daughter also ate it, but she is fine. She also has IBS, but symptoms are different  Vomiting x couple times, diarrhea x3, watery, no blood. No mucus seen. No diarrhea today. Associated symptoms are abdominal cramping that resolved with BMs, fatigue. No abdominal pain today Patient has tried nothing. Diarrhea resolved this morning. Patient denies fever, chills, dizziness. LMP: can't remember. She is on Nexplanon      Allergies - reviewed: Allergies   Allergen Reactions    Sulfa (Sulfonamide Antibiotics) Hives       Meds - reviewed:   Current Outpatient Medications   Medication Sig Dispense Refill    dicyclomine (BENTYL) 10 mg capsule Take 1 Cap by mouth three (3) times daily. 30 Cap 0    ondansetron (ZOFRAN ODT) 4 mg disintegrating tablet Take 1 Tab by mouth every eight (8) hours as needed for Nausea. 30 Tab 0    meloxicam (MOBIC) 7.5 mg tablet Take 1 Tab by mouth daily. 14 Tab 0    methocarbamol (ROBAXIN) 500 mg tablet Take 1 Tab by mouth nightly. 14 Tab 0    dicyclomine (BENTYL) 10 mg capsule Take 1 Cap by mouth three (3) times daily as needed for Pain. 90 Cap 0    fluticasone (FLONASE) 50 mcg/actuation nasal spray 2 sprays per nostril once daily 1 Bottle 1    ibuprofen (MOTRIN) 800 mg tablet Take 1 Tab by mouth every eight (8) hours as needed for Pain. 30 Tab 0    cholecalciferol (VITAMIN D3) 1,000 unit tablet Take 1 Tab by mouth daily. 90 Tab 4    raNITIdine (ZANTAC) 150 mg tablet Take 1 Tab by mouth as needed for Indigestion. 30 Tab 3    PSEUDOEPHEDRINE-guaiFENesin (MUCINEX D MAXIMUM STRENGTH) Tb12 extended release tablet Take 1 Tab by mouth.          PMH- reviewed:  Past Medical History:   Diagnosis Date    Headache(784.0)     IBS (irritable bowel syndrome)        SH- reviewed:  Smoker:  Social History     Tobacco Use   Smoking Status Never Smoker   Smokeless Tobacco Never Used       ETOH- reviewed:   Social History     Substance and Sexual Activity   Alcohol Use Yes    Comment: occasionally       FH- reviewed:   Family History   Problem Relation Age of Onset    Hypertension Mother          ROS: Positive for Items in bold:   Constitutional: headache, fever, fatigue, weight loss or weight gain      Resp: cough or shortness of breath      GI: nausea, vomiting, constipation, diarrhea, blood in stool    Physical Exam:    Visit Vitals  /80   Pulse 92   Temp 98.1 °F (36.7 °C) (Oral)   Resp 16   Ht 5' 3\" (1.6 m)   Wt 144 lb (65.3 kg)   SpO2 99%   BMI 25.51 kg/m²        Gen: No apparent distress. Alert and oriented and responds to all questions appropriately. Eyes: Conjunctiva clear, extraocular movements are intact. Tear film present  Oropharynx: No oral lesions or exudates. Neck: Supple; no masses; no thyromegaly appreciated. No cervical LAD  Cardio: Regular, rate, and rhythm without murmurs, gallops or rubs   Abdomen: Soft; nontender; nondistended; normoactive bowel sounds; no masses or organomegaly  Neurologic: No focal neurologic deficits. Strength and sensation grossly intact. Coordination and gait grossly intact. Ext: Well perfused. No edema. Skin: No obvious rashes. Normal skin turgor            I have personally reviewed the labs results    No results found for this or any previous visit (from the past 12 hour(s)). Assessment and Plan:   Sulma Mendoza is a 30 y.o. female who presents for gastroenteritis symptoms, possible food intoxication. Symptoms are already improving. Advised to stay well hydrated. Will refill patient's Bentyl and zofran to help with symptoms. Strict ED precautions given. RTC for BP check. ICD-10-CM ICD-9-CM    1.  Gastroenteritis K52.9 558.9 dicyclomine (BENTYL) 10 mg capsule      ondansetron (ZOFRAN ODT) 4 mg disintegrating tablet   2. Food poisoning A05.9 005.9    3. Nausea and vomiting, intractability of vomiting not specified, unspecified vomiting type R11.2 787.01    4. Diarrhea, unspecified type R19.7 787.91          Discussed diagnoses in detail with patient. Patient expressed understanding of and agreement to above plan. All questions and concerns addressed. Medication risks/benefits/side effects discussed with patient. Patient is counseled to return to the office and/or ED if symptoms do not improve as expected. Patient discussed with Dr. Darwin Cohen, Attending Physician. Jalen Camargo MD  12/09/19    Family Medicine Resident

## 2019-12-09 NOTE — PATIENT INSTRUCTIONS
Food Poisoning: Care Instructions  Your Care Instructions    Food poisoning occurs when you eat foods that contain harmful germs. Food can be contaminated while it is growing, during processing, or when it is prepared. Fresh fruits and vegetables also can be contaminated if they are washed in contaminated water. You may have become ill after eating undercooked meat or eggs or other unsafe foods. Cooking foods thoroughly and storing them properly can help prevent food poisoning. There are many types of food poisoning. Your symptoms depend on the type of food poisoning you have. You will probably begin to feel better in 1 or 2 days. In the meantime, get plenty of rest and make sure that you do not become dehydrated. Follow-up care is a key part of your treatment and safety. Be sure to make and go to all appointments, and call your doctor if you are having problems. It's also a good idea to know your test results and keep a list of the medicines you take. How can you care for yourself at home? · To prevent dehydration, drink plenty of fluids. Choose water and other caffeine-free clear liquids until you feel better. If you have kidney, heart, or liver disease and have to limit fluids, talk with your doctor before you increase the amount of fluids you drink. · Begin eating small amounts of mild, low-fat foods, depending on how you feel. Try foods like rice, dry crackers, bananas, and applesauce. ? Avoid spicy foods, alcohol, and coffee until 48 hours after all symptoms have disappeared. ? Avoid chewing gum that contains sorbitol. ? Avoid dairy products for 3 days after symptoms disappear. · Take your medicines as prescribed. Call your doctor if you think you are having a problem with your medicine. You will get more details on the specific medicines your doctor prescribes. To prevent food poisoning  · Keep hot foods hot and cold foods cold.   · Do not eat meats, dressings, salads, or other foods that have been kept at room temperature for more than 2 hours. · Use a thermometer to check your refrigerator. It should be between 34°F and 40°F.  · Defrost meats in the refrigerator or microwave, not on the kitchen counter. · Keep your hands and your kitchen clean. Wash your hands, cutting boards, and countertops with hot, soapy water. If you use the same cutting board for chopping vegetables and preparing raw meat, be sure to wash the cutting board with hot, soapy water between each use. · Cook meat until it is well done. · Do not eat raw eggs or uncooked dough or sauces made with raw eggs. · Do not take chances. If you think food looks or tastes spoiled, throw it out. When should you call for help? Call 911 anytime you think you may need emergency care. For example, call if:    · You passed out (lost consciousness).    Call your doctor now or seek immediate medical care if:    · You have new or worse belly pain.     · You have a new or higher fever.     · You are dizzy or lightheaded, or you feel like you may faint.     · You have symptoms of dehydration, such as:  ? Dry eyes and a dry mouth. ? Passing only a little urine. ? Feeling thirstier than normal.     · You cannot keep down medicine or fluids.     · You have new or more blood in stools.     · You have new or worse vomiting or diarrhea.    Watch closely for changes in your health, and be sure to contact your doctor if:    · You do not get better as expected. Where can you learn more? Go to http://marilee-greta.info/. Enter S371 in the search box to learn more about \"Food Poisoning: Care Instructions. \"  Current as of: June 9, 2019  Content Version: 12.2  © 1441-6639 [employer]. Care instructions adapted under license by [employer] (which disclaims liability or warranty for this information).  If you have questions about a medical condition or this instruction, always ask your healthcare professional. [employer] disclaims any warranty or liability for your use of this information.

## 2019-12-09 NOTE — LETTER
NOTIFICATION RETURN TO WORK / SCHOOL 
 
12/9/2019 9:40 AM 
 
[address] [facility] [id number] To Whom It May Concern: 
 
Idalia Abdullahi is currently under the care of [address] [facility]. She will return to work/school on: 12/10/2019 If there are questions or concerns please have the patient contact our office. Sincerely, 
 
 
Jalen Camargo MD

## 2019-12-09 NOTE — PROGRESS NOTES
Chief Complaint   Patient presents with    Vomiting     started yesterday     1. Have you been to the ER, urgent care clinic since your last visit? Hospitalized since your last visit? no    2. Have you seen or consulted any other health care providers outside of the [address] [place] [address] since your last visit? Include any pap smears or colon screening.  no      Had lasagna and salad--started feeling sick soon after    Diarrhea--started yesterday--staying hydrated    Bentyl/zofran refill

## 2020-01-03 ENCOUNTER — HOSPITAL ENCOUNTER (OUTPATIENT)
Dept: GENERAL RADIOLOGY | Age: 30
Discharge: HOME | End: 2020-01-03
Attending: ORTHOPAEDIC SURGERY
Payer: OTHER GOVERNMENT

## 2020-01-03 ENCOUNTER — HOSPITAL ENCOUNTER (OUTPATIENT)
Dept: MRI IMAGING | Age: 30
Discharge: HOME | End: 2020-01-03
Attending: ORTHOPAEDIC SURGERY
Payer: OTHER GOVERNMENT

## 2020-01-03 DIAGNOSIS — S46.011D TRAUMATIC COMPLETE TEAR OF RIGHT ROTATOR CUFF, SUBSEQUENT ENCOUNTER: ICD-10-CM

## 2020-01-03 PROCEDURE — 74011250636 HC RX REV CODE- 250/636: Performed by: RADIOLOGY

## 2020-01-03 PROCEDURE — A9575 INJ GADOTERATE MEGLUMI 0.1ML: HCPCS | Performed by: RADIOLOGY

## 2020-01-03 PROCEDURE — 23350 INJECTION FOR SHOULDER X-RAY: CPT

## 2020-01-03 PROCEDURE — 74011000250 HC RX REV CODE- 250: Performed by: RADIOLOGY

## 2020-01-03 PROCEDURE — 73222 MRI JOINT UPR EXTREM W/DYE: CPT

## 2020-01-03 PROCEDURE — 74011636320 HC RX REV CODE- 636/320: Performed by: RADIOLOGY

## 2020-01-03 RX ORDER — GADOTERATE MEGLUMINE 376.9 MG/ML
2 INJECTION INTRAVENOUS
Status: COMPLETED | OUTPATIENT
Start: 2020-01-03 | End: 2020-01-03

## 2020-01-03 RX ORDER — LIDOCAINE HYDROCHLORIDE 10 MG/ML
10 INJECTION, SOLUTION EPIDURAL; INFILTRATION; INTRACAUDAL; PERINEURAL
Status: COMPLETED | OUTPATIENT
Start: 2020-01-03 | End: 2020-01-03

## 2020-01-03 RX ADMIN — IOHEXOL 10 ML: 300 INJECTION, SOLUTION INTRAVENOUS at 15:20

## 2020-01-03 RX ADMIN — GADOTERATE MEGLUMINE 10 ML: 376.9 INJECTION INTRAVENOUS at 15:20

## 2020-01-03 RX ADMIN — LIDOCAINE HYDROCHLORIDE 5 ML: 10 INJECTION, SOLUTION EPIDURAL; INFILTRATION; INTRACAUDAL; PERINEURAL at 15:20

## 2021-05-06 NOTE — PROGRESS NOTES
28 yo female with  has a past medical history of Headache(784.0). presenting for vitamin D supplementation. Energy improved. Completed supplement. Unable to MTV or OTC due to pallatibility and tolerability. Works in a hospital. She is also  [place] which also inhibits her vitamin d absorption. Past Medical History:   Diagnosis Date    Headache(784.0)      Past Surgical History:   Procedure Laterality Date    HX WISDOM TEETH EXTRACTION  08/14/2015     Family History   Problem Relation Age of Onset    Hypertension Mother      Social History     Social History    Marital status: SINGLE     Spouse name: N/A    Number of children: N/A    Years of education: N/A     Occupational History    Not on file. Social History Main Topics    Smoking status: Never Smoker    Smokeless tobacco: Never Used    Alcohol use Yes      Comment: occasionally    Drug use: No    Sexual activity: Yes     Partners: Male     Birth control/ protection: Implant     Other Topics Concern    Not on file     Social History Narrative       Current Outpatient Prescriptions:     cholecalciferol, vitamin D3, 50,000 unit tab, Take 50,000 Units by mouth every seven (7) days. , Disp: 8 Tab, Rfl: 1    MV-MIN/VIT C/GLU/JANETTE HCL/ ([place]NE, LYSINE HCL, PO), Take  by mouth., Disp: , Rfl:     guaiFENesin (MUCINEX) 1,200 mg Ta12 ER tablet, Take 1,200 mg by mouth two (2) times a day., Disp: , Rfl:     OTHER, Zicam, Disp: , Rfl:   Allergies   Allergen Reactions    Sulfa (Sulfonamide Antibiotics) Hives       ROS: Pertinent ROS performed and negative except as mentioned in the HPI.      Visit Vitals    /79 (BP 1 Location: Right arm, BP Patient Position: Sitting)    Pulse 89    Temp 98.5 °F (36.9 °C) (Oral)    Resp 16    Ht 5' 3\" (1.6 m)    Wt 140 lb (63.5 kg)    SpO2 100%    BMI 24.8 kg/m2     PE:   General appearance - alert, well appearing, and in no distress and normal appearing weight  Chest - clear to auscultation, no wheezes, rales or rhonchi, symmetric air entry  Heart - normal rate, regular rhythm, normal S1, S2, no murmurs, rubs, clicks or gallops  A/P:  Vitamin D deficiency. Recheck today and determine supplementation needs after that. RTC in April for WWE. I have discussed the diagnosis with the patient and the intended plan as seen in the above orders. The patient has received an after-visit summary and questions were answered concerning future plans. I have discussed medication side effects and warnings with the patient as well. Informed pt to return to the office if symptoms worsen or if new symptoms arise. no

## 2021-06-02 ENCOUNTER — TELEPHONE (OUTPATIENT)
Dept: FAMILY MEDICINE CLINIC | Age: 31
End: 2021-06-02

## 2021-06-02 NOTE — TELEPHONE ENCOUNTER
----- Message from Mohamud Kumar sent at 6/2/2021  2:31 PM EDT -----  Regarding: Dr. Radhika Garciar: [phone]  Appointment not available    Caller's first and last name and relationship to patient (if not the patient): N/A      Best contact number:[phone]      Preferred date and time: First available, anytime      Scheduled appointment date and time: N/A      Reason for appointment: CPE with pap      Details to clarify the request: Patient would like to come into the office.         Mohamud Kumar

## 2021-06-03 ENCOUNTER — OFFICE VISIT (OUTPATIENT)
Dept: FAMILY MEDICINE CLINIC | Age: 31
End: 2021-06-03
Payer: OTHER GOVERNMENT

## 2021-06-03 VITALS
BODY MASS INDEX: 24.98 KG/M2 | WEIGHT: 141 LBS | RESPIRATION RATE: 16 BRPM | OXYGEN SATURATION: 98 % | HEART RATE: 97 BPM | SYSTOLIC BLOOD PRESSURE: 121 MMHG | TEMPERATURE: 98 F | HEIGHT: 63 IN | DIASTOLIC BLOOD PRESSURE: 78 MMHG

## 2021-06-03 DIAGNOSIS — B96.89 BACTERIAL VAGINOSIS: Primary | ICD-10-CM

## 2021-06-03 DIAGNOSIS — N89.8 VAGINAL DISCHARGE: ICD-10-CM

## 2021-06-03 DIAGNOSIS — N76.0 BACTERIAL VAGINOSIS: Primary | ICD-10-CM

## 2021-06-03 DIAGNOSIS — E55.9 VITAMIN D DEFICIENCY: ICD-10-CM

## 2021-06-03 LAB — WET MOUNT POCT, WMPOCT: NORMAL

## 2021-06-03 PROCEDURE — 87210 SMEAR WET MOUNT SALINE/INK: CPT | Performed by: FAMILY MEDICINE

## 2021-06-03 PROCEDURE — 99214 OFFICE O/P EST MOD 30 MIN: CPT | Performed by: FAMILY MEDICINE

## 2021-06-03 RX ORDER — MONTELUKAST SODIUM 10 MG/1
TABLET ORAL
COMMUNITY
Start: 2021-05-03 | End: 2022-08-31 | Stop reason: SDUPTHER

## 2021-06-03 RX ORDER — METRONIDAZOLE 500 MG/1
500 TABLET ORAL 2 TIMES DAILY
Qty: 14 TABLET | Refills: 0 | Status: SHIPPED | OUTPATIENT
Start: 2021-06-03 | End: 2021-06-10

## 2021-06-03 RX ORDER — HYDROXYZINE 25 MG/1
TABLET, FILM COATED ORAL
COMMUNITY
Start: 2021-04-01 | End: 2022-08-31 | Stop reason: SINTOL

## 2021-06-03 RX ORDER — FLUCONAZOLE 150 MG/1
150 TABLET ORAL DAILY
Qty: 1 TABLET | Refills: 0 | Status: SHIPPED | OUTPATIENT
Start: 2021-06-03 | End: 2021-06-04

## 2021-06-03 RX ORDER — EPINEPHRINE 0.3 MG/.3ML
INJECTION, SOLUTION INTRAMUSCULAR
COMMUNITY
Start: 2021-04-01

## 2021-06-03 RX ORDER — FAMOTIDINE 40 MG/1
TABLET, FILM COATED ORAL
COMMUNITY
Start: 2021-05-03 | End: 2022-08-31 | Stop reason: SDUPTHER

## 2021-06-03 NOTE — PROGRESS NOTES
Chief Complaint   Patient presents with   Deja El Exam     due for pap - has been having a white, milky discharge with a smell, also has been having some pelvic pain    Vitamin D Deficiency     would like to get her vit D level checked     1. Have you been to the ER, urgent care clinic since your last visit? Hospitalized since your last visit? No    2. Have you seen or consulted any other health care providers outside of the [address] [place] [address] since your last visit? Include any pap smears or colon screening.  Yes - [phone] [facility]

## 2021-06-03 NOTE — PROGRESS NOTES
History of Present Illness:     Chief Complaint   Patient presents with    Gyn Exam     due for pap - has been having a white, milky discharge with a smell, also has been having some pelvic pain    Vitamin D Deficiency     would like to get her vit D level checked       Arthuro Delay is a 26 y.o. female     Recently had a new sexual partner over the past week. Thinks it is BV because she has had it in the past. Noticed discharge yesterday and odor. Today started having pelvic pain. Denies vaginal pain. Very mild vaginal itching. Denies urinary frequency or dysuria. One, new sexual partner. Last STD was many years ago. Recently got Nexplanon. Last pap was Jan this year with [facility]. Normal per patient. Known hx of vitamin D deficiency. Wants to have that checked. PMH (REVIEWED):  Past Medical History:   Diagnosis Date    Headache(784.0)     IBS (irritable bowel syndrome)        Current Medications/Allergies (REVIEWED):     Current Outpatient Medications on File Prior to Visit   Medication Sig Dispense Refill    famotidine (PEPCID) 40 mg tablet       montelukast (SINGULAIR) 10 mg tablet       hydrOXYzine HCL (ATARAX) 25 mg tablet       Auvi-Q 0.3 mg/0.3 mL injection INJECT IN THE MUSCLE AS DIRECTED AS NEEDED      ondansetron (ZOFRAN ODT) 4 mg disintegrating tablet Take 1 Tab by mouth every eight (8) hours as needed for Nausea. 30 Tab 0    methocarbamol (ROBAXIN) 500 mg tablet Take 1 Tab by mouth nightly. (Patient taking differently: Take 500 mg by mouth nightly as needed.) 14 Tab 0    dicyclomine (BENTYL) 10 mg capsule Take 1 Cap by mouth three (3) times daily as needed for Pain. 90 Cap 0    fluticasone (FLONASE) 50 mcg/actuation nasal spray 2 sprays per nostril once daily 1 Bottle 1    ibuprofen (MOTRIN) 800 mg tablet Take 1 Tab by mouth every eight (8) hours as needed for Pain. 30 Tab 0    cholecalciferol (VITAMIN D3) 1,000 unit tablet Take 1 Tab by mouth daily.  90 Tab 4    dicyclomine (BENTYL) 10 mg capsule Take 1 Cap by mouth three (3) times daily. (Patient not taking: Reported on 6/3/2021) 30 Cap 0    meloxicam (MOBIC) 7.5 mg tablet Take 1 Tab by mouth daily. (Patient not taking: Reported on 6/3/2021) 14 Tab 0    raNITIdine (ZANTAC) 150 mg tablet Take 1 Tab by mouth as needed for Indigestion. (Patient not taking: Reported on 6/3/2021) 30 Tab 3    PSEUDOEPHEDRINE-guaiFENesin (MUCINEX D MAXIMUM STRENGTH) Tb12 extended release tablet Take 1 Tab by mouth. (Patient not taking: Reported on 6/3/2021)       No current facility-administered medications on file prior to visit. Allergies   Allergen Reactions    Sulfa (Sulfonamide Antibiotics) Hives         Review of Systems:     Review of Systems   Constitutional: Negative for chills and fever. Genitourinary: Negative for dysuria, frequency, hematuria and urgency. Objective:     Vitals:    06/03/21 1339   BP: 121/78   Pulse: 97   Resp: 16   Temp: 98 °F (36.7 °C)   TempSrc: Oral   SpO2: 98%   Weight: 141 lb (64 kg)   Height: 5' 3\" (1.6 m)       Physical Exam:  General appearance - alert, well appearing, and in no distress  Abdomen - soft, nontender, nondistended, no masses or organomegaly  Pelvic - VULVA: normal appearing vulva with no masses, tenderness or lesions, VAGINA: normal appearing vagina with normal color and thin white discharge, no lesions, CERVIX: normal appearing cervix without discharge or lesions, UTERUS: uterus is normal size, shape, consistency and nontender, ADNEXA: normal adnexa in size, nontender and no masses. Exam chaperoned by Vipin Mayberry LPN. Recent Labs:  No results found for this or any previous visit (from the past 12 hour(s)).     Assessment and Plan:       ICD-10-CM ICD-9-CM    1. Bacterial vaginosis  N76.0 616.10 metroNIDAZOLE (FLAGYL) 500 mg tablet    B96.89 041.9 fluconazole (DIFLUCAN) 150 mg tablet      VITAMIN D, 25 HYDROXY      METABOLIC PANEL, BASIC      CBC W/O DIFF      CHLAMYDIA / GC-AMPLIFIED   2. Vitamin D deficiency  E55.9 268.9 VITAMIN D, 25 HYDROXY      METABOLIC PANEL, BASIC      CBC W/O DIFF      CANCELED: VITAMIN D, 25 HYDROXY      CANCELED: CBC W/O DIFF      CANCELED: METABOLIC PANEL, BASIC      CANCELED: VITAMIN D, 25 HYDROXY   3. Vaginal discharge  N89.8 623.5 AMB POC SMEAR, STAIN & INTERPRET, WET MOUNT      VITAMIN D, 25 HYDROXY      METABOLIC PANEL, BASIC      CBC W/O DIFF      CHLAMYDIA / GC-AMPLIFIED      CANCELED: CHLAMYDIA / GC-AMPLIFIED      CANCELED: CHLAMYDIA / GC-AMPLIFIED       BV, clue cells on wet prep  Flagyl 500mg BID x 7 days    Diflucan sent as well in case she develops yeast infection    Checking routine labs and Vitamin D level    Follow up: PRN    Amanda Vuong MD      We discussed the expected course, resolution and complications of the diagnosis(es) in detail. Medication risks, benefits, costs, interactions, and alternatives were discussed as indicated. I advised her to contact the office if her condition worsens, changes or fails to improve as anticipated. She expressed understanding with the diagnosis(es) and plan.

## 2021-06-03 NOTE — PATIENT INSTRUCTIONS
Bacterial Vaginosis: Care Instructions  Overview     Bacterial vaginosis is a type of vaginal infection. It is caused by excess growth of certain bacteria that are normally found in the vagina. Symptoms can include itching, swelling, pain when you urinate or have sex, and a gray or yellow discharge with a \"fishy\" odor. It is not considered an infection that is spread through sexual contact. Symptoms can be annoying and uncomfortable. But bacterial vaginosis does not usually cause other health problems. However, if you have it while you are pregnant, it can cause complications. While the infection may go away on its own, most doctors use antibiotics to treat it. You may have been prescribed pills or vaginal cream. With treatment, bacterial vaginosis usually clears up in 5 to 7 days. Follow-up care is a key part of your treatment and safety. Be sure to make and go to all appointments, and call your doctor if you are having problems. It's also a good idea to know your test results and keep a list of the medicines you take. How can you care for yourself at home? · Take your antibiotics as directed. Do not stop taking them just because you feel better. You need to take the full course of antibiotics. · Do not eat or drink anything that contains alcohol if you are taking metronidazole or tinidazole. · Keep using your medicine if you start your period. Use pads instead of tampons while using a vaginal cream or suppository. Tampons can absorb the medicine. · Wear loose cotton clothing. Do not wear nylon and other materials that hold body heat and moisture close to the skin. · Do not scratch. Relieve itching with a cold pack or a cool bath. · Do not wash your vaginal area more than once a day. Use plain water or a mild, unscented soap. Do not douche. When should you call for help?   Watch closely for changes in your health, and be sure to contact your doctor if:    · You have unexpected vaginal bleeding.     · You have a fever.     · You have new or increased pain in your vagina or pelvis.     · You are not getting better after 1 week.     · Your symptoms return after you finish the course of your medicine. Where can you learn more? Go to http://www.gray.com/  Enter X360 in the search box to learn more about \"Bacterial Vaginosis: Care Instructions. \"  Current as of: July 17, 2020               Content Version: 12.8  © 2006-2021 [employer]. Care instructions adapted under license by [employer] (which disclaims liability or warranty for this information). If you have questions about a medical condition or this instruction, always ask your healthcare professional. [address] any warranty or liability for your use of this information.

## 2021-06-04 LAB
25(OH)D3 SERPL-MCNC: 22 NG/ML (ref 30–100)
BUN SERPL-MCNC: 14 MG/DL (ref 7–25)
BUN/CREATININE RATIO,BUCR: NORMAL (CALC) (ref 6–22)
CALCIUM SERPL-MCNC: 9.6 MG/DL (ref 8.6–10.2)
CHLORIDE SERPL-SCNC: 104 MMOL/L (ref 98–110)
CO2 SERPL-SCNC: 29 MMOL/L (ref 20–32)
CREAT SERPL-MCNC: 0.77 MG/DL (ref 0.5–1.1)
ERYTHROCYTE [DISTWIDTH] IN BLOOD BY AUTOMATED COUNT: 12.5 % (ref 11–15)
GLUCOSE SERPL-MCNC: 82 MG/DL (ref 65–99)
HCT VFR BLD AUTO: 40.5 % (ref 35–45)
HGB BLD-MCNC: 13.9 G/DL (ref 11.7–15.5)
MCH RBC QN AUTO: 29.4 PG (ref 27–33)
MCHC RBC AUTO-ENTMCNC: 34.3 G/DL (ref 32–36)
MCV RBC AUTO: 85.8 FL (ref 80–100)
PLATELET # BLD AUTO: 375 THOUSAND/UL (ref 140–400)
PMV BLD AUTO: 9.6 FL (ref 7.5–12.5)
POTASSIUM SERPL-SCNC: 3.9 MMOL/L (ref 3.5–5.3)
RBC # BLD AUTO: 4.72 MILLION/UL (ref 3.8–5.1)
SODIUM SERPL-SCNC: 142 MMOL/L (ref 135–146)
WBC # BLD AUTO: 6 THOUSAND/UL (ref 3.8–10.8)

## 2021-06-07 LAB
CHLAMYDIA TRACHOMATIS RNA, TMA: NOT DETECTED
N.GONORRHOEAE RNA,TMA: NOT DETECTED

## 2021-06-08 ENCOUNTER — PATIENT MESSAGE (OUTPATIENT)
Dept: FAMILY MEDICINE CLINIC | Age: 31
End: 2021-06-08

## 2021-06-08 DIAGNOSIS — Z11.4 ENCOUNTER FOR SCREENING FOR HIV: Primary | ICD-10-CM

## 2021-06-09 NOTE — TELEPHONE ENCOUNTER
From: Amanda Vuong MD  To: Kaiden Marc  Sent: 6/8/2021 8:44 AM EDT  Subject: HIV test    Hi Dara,    I just missed the cut off to add on the HIV test. Will need to have it drawn again to check it. Again, so sorry about forgetting to add that. Let me know if you would like to come to the office to have it drawn or have me send you the order to have it drawn at a lab closest to you.     MERCEDEST

## 2021-06-11 LAB — HIV AG/AB, 4TH GEN: NORMAL

## 2021-06-18 ENCOUNTER — PATIENT MESSAGE (OUTPATIENT)
Dept: FAMILY MEDICINE CLINIC | Age: 31
End: 2021-06-18

## 2021-06-18 DIAGNOSIS — F40.243 ANXIETY WITH FLYING: Primary | ICD-10-CM

## 2021-06-18 DIAGNOSIS — K58.0 IRRITABLE BOWEL SYNDROME WITH DIARRHEA: ICD-10-CM

## 2021-06-18 RX ORDER — DIAZEPAM 2 MG/1
TABLET ORAL
Qty: 2 TABLET | Refills: 0 | Status: SHIPPED | OUTPATIENT
Start: 2021-06-18 | End: 2021-08-24 | Stop reason: SDUPTHER

## 2021-06-18 RX ORDER — DICYCLOMINE HYDROCHLORIDE 10 MG/1
10 CAPSULE ORAL
Qty: 90 CAPSULE | Refills: 0 | OUTPATIENT
Start: 2021-06-18

## 2021-06-18 NOTE — TELEPHONE ENCOUNTER
Anxiety with flying. Pt requesting Valium to take PRN when she flies. Reviewed chart and ; no concerns for misuse. Will fill Valium 5mg tabs. Take one tab PRN flying.      Treasure Evans MD

## 2021-08-24 DIAGNOSIS — F40.243 ANXIETY WITH FLYING: ICD-10-CM

## 2021-08-24 RX ORDER — DIAZEPAM 2 MG/1
TABLET ORAL
Qty: 2 TABLET | Refills: 0 | Status: SHIPPED | OUTPATIENT
Start: 2021-08-24 | End: 2021-10-05

## 2021-10-05 DIAGNOSIS — F40.243 ANXIETY WITH FLYING: ICD-10-CM

## 2021-10-05 RX ORDER — DIAZEPAM 2 MG/1
TABLET ORAL
Qty: 2 TABLET | Refills: 0 | Status: SHIPPED | OUTPATIENT
Start: 2021-10-05 | End: 2022-06-20 | Stop reason: SDUPTHER

## 2022-03-19 PROBLEM — K58.9 IRRITABLE BOWEL: Status: ACTIVE | Noted: 2019-03-13

## 2022-03-20 PROBLEM — M54.50 CHRONIC BILATERAL LOW BACK PAIN WITHOUT SCIATICA: Status: ACTIVE | Noted: 2019-07-14

## 2022-03-20 PROBLEM — G89.29 CHRONIC BILATERAL LOW BACK PAIN WITHOUT SCIATICA: Status: ACTIVE | Noted: 2019-07-14

## 2022-06-20 DIAGNOSIS — F40.243 ANXIETY WITH FLYING: ICD-10-CM

## 2022-06-20 RX ORDER — DIAZEPAM 2 MG/1
TABLET ORAL
Qty: 3 TABLET | Refills: 0 | Status: SHIPPED | OUTPATIENT
Start: 2022-06-20

## 2022-06-20 NOTE — TELEPHONE ENCOUNTER
Refilled Valium for anxiety associated with flying. Reviewed PDMP, which was appropriate. OV needed for additional med fills.

## 2022-07-15 ENCOUNTER — PATIENT MESSAGE (OUTPATIENT)
Dept: FAMILY MEDICINE CLINIC | Age: 32
End: 2022-07-15

## 2022-07-15 NOTE — LETTER
NOTIFICATION RETURN TO WORK / SCHOOL    7/18/2022 5:23 PM    Ms. [place] [phone]      To Whom It May Concern:    Kathie Urena is currently under the care of [facility] [facility]. She will return to work/school on: 7/19/22    If there are questions or concerns please have the patient contact our office.         Sincerely,      Mitzi Fuller MD

## 2022-07-18 RX ORDER — FLUCONAZOLE 150 MG/1
150 TABLET ORAL
Qty: 2 TABLET | Refills: 0 | Status: SHIPPED | OUTPATIENT
Start: 2022-07-18 | End: 2022-07-22

## 2022-07-18 NOTE — TELEPHONE ENCOUNTER
From: Kathie Urena  To: Mitzi Fuller MD  Sent: 7/15/2022 10:34 AM EDT  Subject: Savanah Koehlerith Morning Dr. Autumn Norton,    I thought I was having sinus/allergy symptoms that started on July 7th. My symptoms hadn't improved, and my fiancé said I didn't sound well, and suggested I take a COVID test. I tested myself on July 9th and it was positive. I also had a positive PCR on July 11th. I initially had symptoms of extreme fatigue and congestion, cough, headache, dizziness. Today I'm feeling much better than before, but I'm still having a little congestion, fatigue and cough. I also have this sensation in my nares that's burning like water from swimming, however my taste and smell are intact. Thankfully I've been afebrile. Employee health has been notified of my symptoms and estimated my return to work on Monday July 18th. My supervisor reached out to me this morning asking if I would be returning back to work on Monday, but I don't feel like I would be able to with my mild symptoms. I also retested myself this morning and it is still positive. Employee health has informed me that they can't extend my   return to work past Monday. My supervisor is understanding, but I just wanted to make you aware and see if you can excuse me from work on Monday? Magalys has also caused me to have a yeast infection. I had 1 PO dose of fluconazole, which helped a little, but it's still lingering. Can you prescribe a couple more doses to completely clear it? Also, I don't believe I qualify for Paxlovid and understand I'm out of the window for this treatment? Please advise.  Thank you,  Wilman Woods

## 2022-08-31 ENCOUNTER — OFFICE VISIT (OUTPATIENT)
Dept: FAMILY MEDICINE CLINIC | Age: 32
End: 2022-08-31

## 2022-08-31 VITALS
DIASTOLIC BLOOD PRESSURE: 83 MMHG | OXYGEN SATURATION: 98 % | BODY MASS INDEX: 26.22 KG/M2 | RESPIRATION RATE: 16 BRPM | HEIGHT: 63 IN | SYSTOLIC BLOOD PRESSURE: 136 MMHG | WEIGHT: 148 LBS

## 2022-08-31 DIAGNOSIS — J30.89 ENVIRONMENTAL AND SEASONAL ALLERGIES: ICD-10-CM

## 2022-08-31 DIAGNOSIS — Z23 ENCOUNTER FOR IMMUNIZATION: ICD-10-CM

## 2022-08-31 DIAGNOSIS — Z86.16 HISTORY OF COVID-19: ICD-10-CM

## 2022-08-31 DIAGNOSIS — B37.9 ANTIBIOTIC-INDUCED YEAST INFECTION: ICD-10-CM

## 2022-08-31 DIAGNOSIS — T17.308D CHOKING, SUBSEQUENT ENCOUNTER: ICD-10-CM

## 2022-08-31 DIAGNOSIS — T36.95XA ANTIBIOTIC-INDUCED YEAST INFECTION: ICD-10-CM

## 2022-08-31 DIAGNOSIS — K58.0 IRRITABLE BOWEL SYNDROME WITH DIARRHEA: ICD-10-CM

## 2022-08-31 DIAGNOSIS — R06.09 DYSPNEA ON EXERTION: ICD-10-CM

## 2022-08-31 DIAGNOSIS — J01.90 ACUTE NON-RECURRENT SINUSITIS, UNSPECIFIED LOCATION: Primary | ICD-10-CM

## 2022-08-31 DIAGNOSIS — M62.830 BACK SPASM: ICD-10-CM

## 2022-08-31 PROCEDURE — 90471 IMMUNIZATION ADMIN: CPT | Performed by: FAMILY MEDICINE

## 2022-08-31 PROCEDURE — 90715 TDAP VACCINE 7 YRS/> IM: CPT | Performed by: FAMILY MEDICINE

## 2022-08-31 PROCEDURE — 99214 OFFICE O/P EST MOD 30 MIN: CPT | Performed by: FAMILY MEDICINE

## 2022-08-31 RX ORDER — MONTELUKAST SODIUM 10 MG/1
10 TABLET ORAL DAILY
Qty: 90 TABLET | Refills: 3 | Status: SHIPPED | OUTPATIENT
Start: 2022-08-31

## 2022-08-31 RX ORDER — HYDROXYZINE 25 MG/1
TABLET, FILM COATED ORAL
Status: CANCELLED | OUTPATIENT
Start: 2022-08-31

## 2022-08-31 RX ORDER — DICYCLOMINE HYDROCHLORIDE 10 MG/1
10 CAPSULE ORAL
Qty: 90 CAPSULE | Refills: 3 | Status: SHIPPED | OUTPATIENT
Start: 2022-08-31

## 2022-08-31 RX ORDER — METHOCARBAMOL 500 MG/1
500 TABLET, FILM COATED ORAL 4 TIMES DAILY
Qty: 20 TABLET | Refills: 0 | Status: SHIPPED | OUTPATIENT
Start: 2022-08-31

## 2022-08-31 RX ORDER — AMOXICILLIN AND CLAVULANATE POTASSIUM 875; 125 MG/1; MG/1
1 TABLET, FILM COATED ORAL EVERY 12 HOURS
Qty: 14 TABLET | Refills: 0 | Status: SHIPPED | OUTPATIENT
Start: 2022-08-31 | End: 2022-09-07

## 2022-08-31 RX ORDER — CETIRIZINE HCL 10 MG
10 TABLET ORAL
Qty: 90 TABLET | Refills: 3 | Status: SHIPPED | OUTPATIENT
Start: 2022-08-31

## 2022-08-31 RX ORDER — FAMOTIDINE 40 MG/1
40 TABLET, FILM COATED ORAL
Qty: 90 TABLET | Refills: 3 | Status: SHIPPED | OUTPATIENT
Start: 2022-08-31

## 2022-08-31 RX ORDER — FLUCONAZOLE 150 MG/1
150 TABLET ORAL DAILY
Qty: 1 TABLET | Refills: 0 | Status: SHIPPED | OUTPATIENT
Start: 2022-08-31 | End: 2022-09-01

## 2022-08-31 NOTE — PROGRESS NOTES
Elizabeth Hager is a 33 y.o. female    Chief Complaint   Patient presents with    Follow Up Chronic Condition    Allergies         1. Have you been to the ER, urgent care clinic since your last visit? Hospitalized since your last visit? No  2. Have you seen or consulted any other health care providers outside of the [address] [place] [address] since your last visit? Include any pap smears or colon screening.  No    Visit Vitals  /83 (BP 1 Location: Right arm, BP Patient Position: Sitting)   Resp 16   Ht 5' 3\" (1.6 m)   Wt 148 lb (67.1 kg)   SpO2 98%   BMI 26.22 kg/m²     3 most recent PHQ Screens 8/31/2022   Little interest or pleasure in doing things Not at all   Feeling down, depressed, irritable, or hopeless Not at all   Total Score PHQ 2 0     Health Maintenance Due   Topic Date Due    Depression Screen  Never done    Cervical cancer screen  04/03/2022    DTaP/Tdap/Td series (6 - Td or Tdap) 06/27/2022     Dyspnea on exertion, out of breath   Had COVID July 9th  Used to be able to walk 2.5 miles but is tired  after a mile now     On Sunday had an incident with hot sauce on chicken, green phelgm that inhaled a piece of chicken     Robaxin     January 2021 possibly got a PAP

## 2022-08-31 NOTE — PROGRESS NOTES
History of Present Illness:     Chief Complaint   Patient presents with    Follow Up Chronic Condition    Allergies       Walt Fernandez is a 33 y.o. female     Allergies  Needs refills on her Singulair    IBS  Doing well with Bentyl  Needs med refills    Recently had an incident where she inhaled a piece of chicken through her nose  The next day she had a sore throat  and nose was burning  Thinks she may have coughed it up   Still having sore throat, coughing up phlegm  Feels like she has to clear her throat  Still feels like something is stuck or mucus in her throat  Green in color     Still having dyspnea on exertion since having COVID  Unable to walk her 2-4 miles as usual, feeling more winded    Getting  in October    PMH (REVIEWED):  Past Medical History:   Diagnosis Date    Headache(784.0)     IBS (irritable bowel syndrome)        Current Medications/Allergies (REVIEWED):     Current Outpatient Medications on File Prior to Visit   Medication Sig Dispense Refill    diazePAM (VALIUM) 2 mg tablet TAKE ONE TABLET BY MOUTH ONCE PRIOR TO FLYING 3 Tablet 0    Auvi-Q 0.3 mg/0.3 mL injection INJECT IN THE MUSCLE AS DIRECTED AS NEEDED      ondansetron (ZOFRAN ODT) 4 mg disintegrating tablet Take 1 Tab by mouth every eight (8) hours as needed for Nausea. 30 Tab 0    fluticasone (FLONASE) 50 mcg/actuation nasal spray 2 sprays per nostril once daily 1 Bottle 1    cholecalciferol (VITAMIN D3) 1,000 unit tablet Take 1 Tab by mouth daily. 90 Tab 4    ibuprofen (MOTRIN) 800 mg tablet Take 1 Tab by mouth every eight (8) hours as needed for Pain. 30 Tab 0     No current facility-administered medications on file prior to visit. Allergies   Allergen Reactions    Sulfa (Sulfonamide Antibiotics) Hives         Review of Systems:     Review of Systems   Constitutional:  Negative for chills and fever. HENT:  Positive for sinus pain and sore throat. Respiratory:  Positive for cough and sputum production. +Dyspnea on exertion   Cardiovascular:  Positive for leg swelling. Negative for chest pain and palpitations. Objective:     Vitals:    08/31/22 1444   BP: 136/83   Resp: 16   SpO2: 98%   Weight: 148 lb (67.1 kg)   Height: 5' 3\" (1.6 m)       Physical Exam:  General appearance - alert, well appearing, and in no distress  Neck - supple, no significant adenopathy  Chest - clear to auscultation, no wheezes, rales or rhonchi, symmetric air entry  Heart - normal rate, regular rhythm, normal S1, S2, no murmurs, rubs, clicks or gallops  Extremities - peripheral pulses normal, no pedal edema, no clubbing or cyanosis    Recent Labs:  No results found for this or any previous visit (from the past 12 hour(s)). Assessment and Plan:       ICD-10-CM ICD-9-CM    1. Acute non-recurrent sinusitis, unspecified location  J01.90 461.9 amoxicillin-clavulanate (AUGMENTIN) 875-125 mg per tablet      CBC W/O DIFF      CBC W/O DIFF      2. Irritable bowel syndrome with diarrhea  K58.0 564.1 dicyclomine (BENTYL) 10 mg capsule      3. Environmental and seasonal allergies  J30.89 477.8 famotidine (PEPCID) 40 mg tablet      montelukast (SINGULAIR) 10 mg tablet      cetirizine (ZYRTEC) 10 mg tablet      4. Back spasm  M62.830 724.8 methocarbamoL (ROBAXIN) 500 mg tablet      5. Choking, subsequent encounter  T17.308D V58.89 XR CHEST PA LAT     933.1       6. Encounter for immunization  Z23 V03.89 TDAP, BOOSTRIX, (AGE 10 YRS+), IM      MT IMMUNIZ ADMIN,1 SINGLE/COMB VAC/TOXOID      7. Antibiotic-induced yeast infection  B37.9 112.9 fluconazole (DIFLUCAN) 150 mg tablet    T36.95XA E930.9       8. Dyspnea on exertion  R06.00 786.09       9.  History of COVID-19  T35.54 P00.53 METABOLIC PANEL, BASIC      NT-PRO BNP      NT-PRO BNP      METABOLIC PANEL, BASIC          Sinus pain/ choking incident  Will start ppx Augmentin  CXR done today; no significant findings on brief review  Will follow up final radiology read    IBS  Refilled Bentyl    Allergies  SE of Hydroxyzine, causing vaginal dryness  Will continue Singulair, Pepcid nightly  DC Hydroxyzine  Trial Zyrtec daily    Back spasms  Filled Robaxin for PRN use    Diflucan sent for abx induced yeast infection    KUNZ post COVID  No acute changes  CXR done today  Consider Pulm eval for PFTs if no improvement    Follow up: PRN pending improvement of symptoms. 1 year for annual wellness. Uriel Woo MD    We discussed the expected course, resolution and complications of the diagnosis(es) in detail. Medication risks, benefits, costs, interactions, and alternatives were discussed as indicated. I advised her to contact the office if her condition worsens, changes or fails to improve as anticipated. She expressed understanding with the diagnosis(es) and plan.

## 2022-09-01 LAB
ANION GAP SERPL CALC-SCNC: 6 MMOL/L (ref 5–15)
BNP SERPL-MCNC: 48 PG/ML
BUN SERPL-MCNC: 9 MG/DL (ref 6–20)
BUN/CREAT SERPL: 11 (ref 12–20)
CALCIUM SERPL-MCNC: 9.5 MG/DL (ref 8.5–10.1)
CHLORIDE SERPL-SCNC: 107 MMOL/L (ref 97–108)
CO2 SERPL-SCNC: 28 MMOL/L (ref 21–32)
CREAT SERPL-MCNC: 0.79 MG/DL (ref 0.55–1.02)
ERYTHROCYTE [DISTWIDTH] IN BLOOD BY AUTOMATED COUNT: 13.5 % (ref 11.5–14.5)
GLUCOSE SERPL-MCNC: 92 MG/DL (ref 65–100)
HCT VFR BLD AUTO: 40.6 % (ref 35–47)
HGB BLD-MCNC: 14.1 G/DL (ref 11.5–16)
MCH RBC QN AUTO: 28.8 PG (ref 26–34)
MCHC RBC AUTO-ENTMCNC: 34.7 G/DL (ref 30–36.5)
MCV RBC AUTO: 82.9 FL (ref 80–99)
NRBC # BLD: 0 K/UL (ref 0–0.01)
NRBC BLD-RTO: 0 PER 100 WBC
PLATELET # BLD AUTO: 330 K/UL (ref 150–400)
PMV BLD AUTO: 9.7 FL (ref 8.9–12.9)
POTASSIUM SERPL-SCNC: 3.5 MMOL/L (ref 3.5–5.1)
RBC # BLD AUTO: 4.9 M/UL (ref 3.8–5.2)
SODIUM SERPL-SCNC: 141 MMOL/L (ref 136–145)
WBC # BLD AUTO: 10.8 K/UL (ref 3.6–11)

## 2022-09-07 ENCOUNTER — PATIENT MESSAGE (OUTPATIENT)
Dept: FAMILY MEDICINE CLINIC | Age: 32
End: 2022-09-07

## 2022-09-07 DIAGNOSIS — T36.95XA ANTIBIOTIC-INDUCED YEAST INFECTION: ICD-10-CM

## 2022-09-07 DIAGNOSIS — B37.9 ANTIBIOTIC-INDUCED YEAST INFECTION: ICD-10-CM

## 2022-09-07 DIAGNOSIS — T17.308D CHOKING, SUBSEQUENT ENCOUNTER: Primary | ICD-10-CM

## 2022-09-07 DIAGNOSIS — J01.90 ACUTE NON-RECURRENT SINUSITIS, UNSPECIFIED LOCATION: ICD-10-CM

## 2022-09-13 NOTE — TELEPHONE ENCOUNTER
From: Richard Woods MD  To: Walt Fernandez  Sent: 9/7/2022 11:21 AM EDT  Subject: Checking In    [address] [place] [address],    Doing better since the antibiotic?     JT

## 2022-09-14 RX ORDER — FLUCONAZOLE 150 MG/1
150 TABLET ORAL
Qty: 2 TABLET | Refills: 0 | Status: SHIPPED | OUTPATIENT
Start: 2022-09-14 | End: 2022-09-15

## 2022-09-14 RX ORDER — AMOXICILLIN AND CLAVULANATE POTASSIUM 875; 125 MG/1; MG/1
1 TABLET, FILM COATED ORAL EVERY 12 HOURS
Qty: 14 TABLET | Refills: 0 | Status: SHIPPED | OUTPATIENT
Start: 2022-09-14 | End: 2022-09-21

## 2022-09-14 NOTE — TELEPHONE ENCOUNTER
Given pt continues to have symptoms of productive cough, will extend Augmentin script for 1 week. She will see ENT next week. Concerned that there may be retained food in the sinuses or upper respiratory tract given recent choking incident.      Genie Silveira MD

## 2022-09-15 ENCOUNTER — TELEPHONE (OUTPATIENT)
Dept: ENT CLINIC | Age: 32
End: 2022-09-15

## 2022-09-19 ENCOUNTER — PATIENT MESSAGE (OUTPATIENT)
Dept: FAMILY MEDICINE CLINIC | Age: 32
End: 2022-09-19

## 2022-09-19 RX ORDER — FLUTICASONE PROPIONATE 50 MCG
SPRAY, SUSPENSION (ML) NASAL
Qty: 1 EACH | Refills: 3 | Status: SHIPPED | OUTPATIENT
Start: 2022-09-19

## 2022-09-19 NOTE — TELEPHONE ENCOUNTER
From: Felicia Lock  To: Dayton Scott MD  Sent: 2022 7:38 AM EDT  Subject: Medication     Good Morning Dr. Myron Dodge,    I went to  my prescriptions and was trying to refill my Flonase, but the prescription from Dr. Ish Cheema (allergist) has . I was wondering if you could send over a prescription for it so I can have it for my allergies. Im completely out.      Thank you,    Oswaldo Esquivel

## 2022-11-15 ENCOUNTER — PATIENT MESSAGE (OUTPATIENT)
Dept: FAMILY MEDICINE CLINIC | Age: 32
End: 2022-11-15

## 2022-11-15 DIAGNOSIS — M62.830 BACK SPASM: ICD-10-CM

## 2022-11-15 RX ORDER — METHOCARBAMOL 500 MG/1
500 TABLET, FILM COATED ORAL 4 TIMES DAILY
Qty: 20 TABLET | Refills: 0 | Status: CANCELLED | OUTPATIENT
Start: 2022-11-15

## 2022-11-15 NOTE — LETTER
NOTIFICATION RETURN TO WORK    11/17/2022 11:36 AM    Ms[phone]      To Whom It May Concern:    Charo High is currently under the care of [address] [facility]. She will return to work on: Monday Nov 21    If there are questions or concerns please have the patient contact our office.         Sincerely,  Pam Gusman, DO

## 2022-11-16 ENCOUNTER — OFFICE VISIT (OUTPATIENT)
Dept: FAMILY MEDICINE CLINIC | Age: 32
End: 2022-11-16
Payer: COMMERCIAL

## 2022-11-16 VITALS
TEMPERATURE: 98.4 F | HEIGHT: 63 IN | SYSTOLIC BLOOD PRESSURE: 146 MMHG | BODY MASS INDEX: 27.36 KG/M2 | WEIGHT: 154.4 LBS | DIASTOLIC BLOOD PRESSURE: 86 MMHG | RESPIRATION RATE: 16 BRPM | HEART RATE: 81 BPM | OXYGEN SATURATION: 100 %

## 2022-11-16 DIAGNOSIS — S06.0X0A CONCUSSION WITHOUT LOSS OF CONSCIOUSNESS, INITIAL ENCOUNTER: Primary | ICD-10-CM

## 2022-11-16 DIAGNOSIS — R03.0 ELEVATED BP WITHOUT DIAGNOSIS OF HYPERTENSION: ICD-10-CM

## 2022-11-16 DIAGNOSIS — M62.838 TRAPEZIUS MUSCLE SPASM: ICD-10-CM

## 2022-11-16 PROCEDURE — 99213 OFFICE O/P EST LOW 20 MIN: CPT | Performed by: FAMILY MEDICINE

## 2022-11-16 RX ORDER — METHOCARBAMOL 500 MG/1
500 TABLET, FILM COATED ORAL
Qty: 15 TABLET | Refills: 0 | Status: SHIPPED | OUTPATIENT
Start: 2022-11-16

## 2022-11-16 RX ORDER — HYDROXYZINE 25 MG/1
1 TABLET, FILM COATED ORAL
COMMUNITY
Start: 2022-10-14

## 2022-11-16 NOTE — PROGRESS NOTES
John Tobar is a 27 y.o. female who presents for follow up of fall on Sunday morning. Slipped on floor and hit back of head on bottom step. No LOC, amnesia, blood thinners, seizure. No pain immediately after. Headache started Monday night. Constant yesterday, today not present at work but started after work. Symptoms currently include severe head and neck pain, slight nausea tonight but no vomiting, mild dizziness that comes and goes, mild fatigue, mild irritability. Has tried ibuprofen migraine without relief, also tried robaxin which helped some (just had one pilled). Review of Systems          Past Medical History  Past Medical History:   Diagnosis Date    Headache(784.0)     IBS (irritable bowel syndrome)        Current Medications  Current Outpatient Medications   Medication Sig Dispense Refill    hydrOXYzine HCL (ATARAX) 25 mg tablet Take 1 Tablet by mouth nightly. methocarbamoL (ROBAXIN) 500 mg tablet Take 1 Tablet by mouth nightly as needed for Muscle Spasm(s). 15 Tablet 0    fluticasone propionate (FLONASE) 50 mcg/actuation nasal spray 2 sprays per nostril once daily 1 Each 3    dicyclomine (BENTYL) 10 mg capsule Take 1 Capsule by mouth three (3) times daily as needed for Pain. 90 Capsule 3    famotidine (PEPCID) 40 mg tablet Take 1 Tablet by mouth nightly. 90 Tablet 3    montelukast (SINGULAIR) 10 mg tablet Take 1 Tablet by mouth daily. 90 Tablet 3    cetirizine (ZYRTEC) 10 mg tablet Take 1 Tablet by mouth nightly. 90 Tablet 3    diazePAM (VALIUM) 2 mg tablet TAKE ONE TABLET BY MOUTH ONCE PRIOR TO FLYING 3 Tablet 0    Auvi-Q 0.3 mg/0.3 mL injection INJECT IN THE MUSCLE AS DIRECTED AS NEEDED      ondansetron (ZOFRAN ODT) 4 mg disintegrating tablet Take 1 Tab by mouth every eight (8) hours as needed for Nausea. 30 Tab 0    ibuprofen (MOTRIN) 800 mg tablet Take 1 Tab by mouth every eight (8) hours as needed for Pain.  30 Tab 0    cholecalciferol (VITAMIN D3) 1,000 unit tablet Take 1 Tab by mouth daily. 90 Tab 4         Objective   Vital Signs  Visit Vitals  BP (!) 146/86 (BP 1 Location: Left upper arm, BP Patient Position: Sitting, BP Cuff Size: Adult)   Pulse 81   Temp 98.4 °F (36.9 °C) (Oral)   Resp 16   Ht 5' 3\" (1.6 m)   Wt 154 lb 6.4 oz (70 kg)   SpO2 100%   BMI 27.35 kg/m²       General: Alert and oriented, in no acute distress. Responds to all questions appropriately. EYES: Conjunctiva are clear bilaterally; pupils round and reactive to light; extraocular movements intact. Accomodation: 3 inches    Horizontal tracking: No nystagmus   NECK:  Notable for Left/right rotation limited to 60-degrees. Flexion 80-degrees and extension about 60-degrees. All movements produce pain except for flexion. Hypertonicity of left neck musculature noted. LUNGS: Respirations unlabored  CARDIOVASCULAR: Regular rate  NEUROLOGIC:     Cranial nerves II - XII: Intact   Speech: Normal.     Face: Symmetrical   Extremities: Moving all equally, no edema. Strength and sensation: Grossly intact. Gait: Normal   Rhombergs: Negative   Repeated movements with right thumb extended and tracking with left thumb from thumb to nose with: Eyes open: intact; Eyes closed: intact   Repeated movements with left thumb extended and tracking with right thumb from thumb to nose with: Eyes open: intact; Eyes closed: intact    Able to perform 3 word recall at 1 min and 5 min. Able to spell WORLD frontwards and backwards. Serial 7s intact although slight difficulty. Long term memory intact (remembers phone number, address). BRIJESH: Double leg stance: 0; Tandem Stance: 0; Single leg stance 0  King-Devic: Test 1: 22.46 seconds/ 0 errors; Test 2: 32.76 seconds/ 0 errors; Test 3: 39.84 seconds/ 0 errors. Assessment   Lanette Tobar is a 27 y. o. who is here for follow up of head injury with symptoms suggestive of a concussion    Plan   Diagnoses and all orders for this visit:    1.  Concussion without loss of consciousness, initial encounter: No LOC, amnesia, or neurologic deficits on exam to indicate need for imaging. Algie Latoya testing slightly prolonged (sum of 3 tests 95.06s). - Tylenol or motrin prn for headache  - Cognitive rest advised  - Follow up in one week to repeat testing and ensure improvement    2. Trapezius muscle spasm: Sundance C-spine rule low risk for neck injury, no bony or midline tenderness. Tenderness is left sided and extends to posterior scalp suggesting muscles spasm of trapezius. -     methocarbamoL (ROBAXIN) 500 mg tablet; Take 1 Tablet by mouth nightly as needed for Muscle Spasm(s). 3. Elevated BP without diagnosis of hypertension: initially elevated at 160/84, improved on recheck to 146/86. Possibly secondary to headache.    - Record pressures at home to review at follow up in one week    Follow up in one week    Patient is counseled to return to the office if symptoms do not improve as expected. Urgent consultation with the nearest Emergency Department is strongly recommended if condition worsens. Patient is counseled to follow up as recommended and to inform the office if any changes in treatment are recommended.       I discussed this patient with Dr. Mami Tineo (Attending Physician)       Signed By:  Martha Cohen DO    Family Medicine Resident     .Jimmy Esqueda

## 2022-11-16 NOTE — PROGRESS NOTES
Identified pt with two pt identifiers(name and ). Reviewed record in preparation for visit and have obtained necessary documentation. Chief Complaint   Patient presents with    Ron Staples on , left side of neck and headache pain, no loss of consciousness, no tingling        Health Maintenance Due   Topic    COVID-19 Vaccine (4 - Booster for Pfizer series)    Flu Vaccine (1)       Visit Vitals  BP (!) 160/84 (BP 1 Location: Right upper arm, BP Patient Position: Sitting, BP Cuff Size: Adult)   Pulse 81   Temp 98.4 °F (36.9 °C) (Oral)   Resp 16   Ht 5' 3\" (1.6 m)   Wt 154 lb 6.4 oz (70 kg)   SpO2 100%   BMI 27.35 kg/m²         Coordination of Care Questionnaire:  :   1) Have you been to an emergency room, urgent care, or hospitalized since your last visit? If yes, where when, and reason for visit? no       2. Have seen or consulted any other health care provider since your last visit? If yes, where when, and reason for visit? NO      Patient is accompanied by spouse I have received verbal consent from Matthew Christopher to discuss any/all medical information while they are present in the room.

## 2022-11-17 NOTE — TELEPHONE ENCOUNTER
From: Dayne Rankin  To: Brooklyn Russo MD  Sent: 11/15/2022 12:35 PM EST  Subject: Ya Dumont Crews,    We are getting new floors in our home (dust and sand everywhere) and I slipped on the floor as I came down the steps this past Sunday and hit the back of my head on the bottom of the steps in the process. The back of my neck has been hurting and having headaches since last night. I typically have headaches sometimes, but this has been constant more to the back of my head. No LOC, blurred vision or change of vision, but Ive been a little dizzy. Wondering if we can get some imaging head/neck CT.      Didier Newton

## 2022-11-23 ENCOUNTER — OFFICE VISIT (OUTPATIENT)
Dept: FAMILY MEDICINE CLINIC | Age: 32
End: 2022-11-23
Payer: COMMERCIAL

## 2022-11-23 VITALS
OXYGEN SATURATION: 98 % | DIASTOLIC BLOOD PRESSURE: 92 MMHG | SYSTOLIC BLOOD PRESSURE: 145 MMHG | WEIGHT: 160 LBS | HEART RATE: 91 BPM | BODY MASS INDEX: 28.34 KG/M2

## 2022-11-23 DIAGNOSIS — S06.0X0D CONCUSSION WITHOUT LOSS OF CONSCIOUSNESS, SUBSEQUENT ENCOUNTER: Primary | ICD-10-CM

## 2022-11-23 DIAGNOSIS — R03.0 ELEVATED BLOOD PRESSURE READING IN OFFICE WITHOUT DIAGNOSIS OF HYPERTENSION: ICD-10-CM

## 2022-11-23 PROCEDURE — 99213 OFFICE O/P EST LOW 20 MIN: CPT | Performed by: STUDENT IN AN ORGANIZED HEALTH CARE EDUCATION/TRAINING PROGRAM

## 2022-11-23 NOTE — PROGRESS NOTES
Chief Complaint   Patient presents with    Follow-up     FROM FALL     Visit Vitals  BP (!) 144/78 (BP 1 Location: Right arm, BP Patient Position: Sitting, BP Cuff Size: Adult long)   Pulse (!) 111   Wt 160 lb (72.6 kg)   SpO2 98%   BMI 28.34 kg/m²

## 2022-11-23 NOTE — PROGRESS NOTES
[address] [facility] [address] [address]   Office [phone], Fax [phone]    Subjective:     Chief Complaint   Patient presents with    Follow-up     FROM FALL     History provided by patient     Elian Machuca is a 27 y.o. female presents for follow up on concussion. Had on fall on 11/13- slipped on floor and hit back of her head. Biggest symptoms initially were headache, neck pain, N, fatigue, irritability. Was seen in clinic on 11/16 and diagnosed with a concussion. Was advised cognitive rest, tylenol/ibuprofen prn, and robaxin prn for trap spasm. Symptoms today 95-98% improved. She reports slight fatigue and irritability but otherwise is asymptomatic. BP was also elevated on 11/16 to 146/86. Home blood pressure since that visit has been 110/120/70s. 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 Medication reviewed. Current Outpatient Medications:     hydrOXYzine HCL (ATARAX) 25 mg tablet, Take 1 Tablet by mouth nightly., Disp: , Rfl:     methocarbamoL (ROBAXIN) 500 mg tablet, Take 1 Tablet by mouth nightly as needed for Muscle Spasm(s). , Disp: 15 Tablet, Rfl: 0    fluticasone propionate (FLONASE) 50 mcg/actuation nasal spray, 2 sprays per nostril once daily, Disp: 1 Each, Rfl: 3    dicyclomine (BENTYL) 10 mg capsule, Take 1 Capsule by mouth three (3) times daily as needed for Pain., Disp: 90 Capsule, Rfl: 3    famotidine (PEPCID) 40 mg tablet, Take 1 Tablet by mouth nightly., Disp: 90 Tablet, Rfl: 3    montelukast (SINGULAIR) 10 mg tablet, Take 1 Tablet by mouth daily. , Disp: 90 Tablet, Rfl: 3    cetirizine (ZYRTEC) 10 mg tablet, Take 1 Tablet by mouth nightly., Disp: 90 Tablet, Rfl: 3    diazePAM (VALIUM) 2 mg tablet, TAKE ONE TABLET BY MOUTH ONCE PRIOR TO FLYING, Disp: 3 Tablet, Rfl: 0    Auvi-Q 0.3 mg/0.3 mL injection, INJECT IN THE MUSCLE AS DIRECTED AS NEEDED, Disp: , Rfl:     ondansetron (ZOFRAN ODT) 4 mg disintegrating tablet, Take 1 Tab by mouth every eight (8) hours as needed for Nausea., Disp: 30 Tab, Rfl: 0    ibuprofen (MOTRIN) 800 mg tablet, Take 1 Tab by mouth every eight (8) hours as needed for Pain., Disp: 30 Tab, Rfl: 0    cholecalciferol (VITAMIN D3) 1,000 unit tablet, Take 1 Tab by mouth daily. , Disp: 90 Tab, Rfl: 4     Allergy reviewed. Allergies   Allergen Reactions    Sulfa (Sulfonamide Antibiotics) Hives        Past Medical History:   Diagnosis Date    Headache(784.0)     IBS (irritable bowel syndrome)        Social History     Socioeconomic History    Marital status: SINGLE   Tobacco Use    Smoking status: Never    Smokeless tobacco: Never   Vaping Use    Vaping Use: Never used   Substance and Sexual Activity    Alcohol use: Yes     Comment: occasionally    Drug use: No    Sexual activity: Yes     Partners: Male     Birth control/protection: Implant       ROS:  Pertinent ROS in HPI    Objective:   Vitals - reviewed  Visit Vitals  BP (!) 145/92   Pulse 91   Wt 160 lb (72.6 kg)   SpO2 98%   BMI 28.34 kg/m²        Physical exam:   General: Alert and oriented, in no acute distress. Responds to all questions appropriately. EYES: Conjunctiva are clear bilaterally; pupils round and reactive to light; extraocular movements intact. Accomodation: 1-2 inches    Horizontal tracking: no nystagmus    Eye tracking from examiners nose to examiners thumb on command:     no nystagmus. Coordinated movements:  NECK:  Supple; no masses; no lymphadenopathy. LUNGS: Respirations unlabored; clear to auscultation bilaterally. CARDIOVASCULAR: Regular, rate, and rhythm without murmurs, gallops or rubs  NEUROLOGIC:     Cranial nerves II - XII: Intact   Speech: Normal.     Face: Symmetrical   Extremities: Moving all equally, well perfused, and no edema. Strength and sensation: Grossly intact.      Gait: Normal   Rhombergs: Negative   Finger to Nose with eyes closed: intact    Repeated movements with right thumb extended and tracking with left thumb from thumb to nose with: Eyes open: intact; Eyes closed: intact   Repeated movements with left thumb extended and tracking with right thumb from thumb to nose with: Eyes open: intact; Eyes closed: intact    Able to perform 3 word recall at 1 min and 5 min. Able to spell WORLD frontwards and backwards. Serial 7s intact. Long term memory intact (remembers phone number, address). Concussion testing today: BRIJESH: Double leg stance: 0; Tandem Stance: 0; Single leg stance 0  Ronnie-Devic: Test 1: 18.64 seconds; Test 2: 19 seconds; Test 3: 25.56 seconds  Sx score today: 2    Concussion testing on 11/16: BRIJESH: Double leg stance: 0; Tandem Stance: 0; Single leg stance 0  Ronnie-Devic: Test 1: 22.46 seconds/ 0 errors; Test 2: 32.76 seconds/ 0 errors; Test 3: 39.84 seconds/ 0 errors. Sx score on 11/16: 13      Assessment and orders:       ICD-10-CM ICD-9-CM    1. Concussion without loss of consciousness, subsequent encounter  S06.0X0D V58.89      850.0       2. Elevated blood pressure reading in office without diagnosis of hypertension  R03.0 796.2         1. Concussion: 95-98% better today, physical exam and concussion testing improved from previous visit  - Cleared to return to daily activities and work      2. Elevated BP without HTN: BP well controlled at home  - Continue to monitor, follow up if consistently >140/90      Pt was discussed with Dr Eugene Arndt (attending physician). I have reviewed patient medical and social history and medications. I have reviewed pertinent labs results and other data. I have discussed the diagnosis with the patient and the intended plan as seen in the above orders. The patient has received an after-visit summary and questions were answered concerning future plans. I have discussed medication side effects and warnings with the patient as well.     Jade Phelan, DO  [facility]  11/23/22

## 2023-01-25 ENCOUNTER — PATIENT MESSAGE (OUTPATIENT)
Dept: FAMILY MEDICINE CLINIC | Age: 33
End: 2023-01-25

## 2023-01-25 DIAGNOSIS — M62.838 TRAPEZIUS MUSCLE SPASM: ICD-10-CM

## 2023-01-25 DIAGNOSIS — J30.89 ENVIRONMENTAL AND SEASONAL ALLERGIES: Primary | ICD-10-CM

## 2023-01-25 DIAGNOSIS — K58.0 IRRITABLE BOWEL SYNDROME WITH DIARRHEA: ICD-10-CM

## 2023-01-26 RX ORDER — DICYCLOMINE HYDROCHLORIDE 10 MG/1
10 CAPSULE ORAL
Qty: 90 CAPSULE | Refills: 3 | Status: SHIPPED | OUTPATIENT
Start: 2023-01-26

## 2023-01-26 RX ORDER — HYDROXYZINE 25 MG/1
25 TABLET, FILM COATED ORAL
Qty: 90 TABLET | Refills: 1 | Status: SHIPPED | OUTPATIENT
Start: 2023-01-26 | End: 2023-02-14

## 2023-01-26 NOTE — TELEPHONE ENCOUNTER
Refill Request Received for the Following Medication     Requested Prescriptions     Pending Prescriptions Disp Refills    hydrOXYzine HCL (ATARAX) 25 mg tablet       Sig: Take 1 Tablet by mouth nightly. Last Prescribed: Never recently prescribed by provider     Last Appointment With Me:  8/31/2022     Future Appointments:  No future appointments.

## 2023-01-26 NOTE — TELEPHONE ENCOUNTER
From: Rudene Pallas  To: Gene Hancock MD  Sent: 1/25/2023 8:05 PM EST  Subject: Medication     Hello Dr. Elsa Jenkins,    During one of my visits with you we discussed me just taking cetirizine at night for my allergies instead of the hydroxyzine due to some undesirable side effects I shared with you. However, it works really well for me, so I started back taking it at night with my other allergy meds. Im completely out of my hydroxyzine. Can you please refill it for a 90 day supply with refills? Im taking 25mg QHS. I would greatly appreciate it.     Thank you,    Sintia Burgos

## 2023-01-27 RX ORDER — METHOCARBAMOL 500 MG/1
500 TABLET, FILM COATED ORAL
Qty: 15 TABLET | Refills: 0 | Status: SHIPPED | OUTPATIENT
Start: 2023-01-27

## 2023-02-14 RX ORDER — HYDROXYZINE 25 MG/1
25 TABLET, FILM COATED ORAL
Qty: 360 TABLET | Refills: 1 | Status: SHIPPED | OUTPATIENT
Start: 2023-02-14

## 2023-02-15 ENCOUNTER — VIRTUAL VISIT (OUTPATIENT)
Dept: FAMILY MEDICINE CLINIC | Age: 33
End: 2023-02-15

## 2023-02-15 ENCOUNTER — APPOINTMENT (OUTPATIENT)
Dept: FAMILY MEDICINE CLINIC | Age: 33
End: 2023-02-15

## 2023-02-15 DIAGNOSIS — J06.9 UPPER RESPIRATORY TRACT INFECTION, UNSPECIFIED TYPE: Primary | ICD-10-CM

## 2023-02-15 LAB
QUICKVUE INFLUENZA TEST: NEGATIVE
VALID INTERNAL CONTROL?: YES

## 2023-02-15 RX ORDER — BENZONATATE 100 MG/1
100 CAPSULE ORAL
Qty: 20 CAPSULE | Refills: 0 | Status: SHIPPED | OUTPATIENT
Start: 2023-02-15 | End: 2023-02-22

## 2023-02-15 RX ORDER — ACETAMINOPHEN 500 MG
1000 TABLET ORAL 3 TIMES DAILY
Qty: 42 TABLET | Refills: 0 | Status: SHIPPED | OUTPATIENT
Start: 2023-02-15 | End: 2023-02-22

## 2023-02-15 NOTE — PROGRESS NOTES
Howard Mas  27 y.o. female  1990  [address] [facility] [address]  [id number]    Telemedicine Progress Note  Myla Shah MD       Encounter Date and Time: February 15, 2023 at 10:01 AM    Consent: Howard Mas, who was seen by synchronous (real-time) audio-video technology, and/or her healthcare decision maker, is aware that this patient-initiated, Telehealth encounter on 2/15/2023 is a billable service, with coverage as determined by her insurance carrier. She is aware that she may receive a bill and has provided verbal consent to proceed: Yes. No chief complaint on file. History of Present Illness   Howard Mas is a 27 y.o. female was evaluated by synchronous (real-time) audio-video technology from home, through a secure patient portal.    Sore throat. Maxillary inus pressure. COVID neg this am. No fevers. Has bee exposed to COVID at work. Stepson is positive for COVID. Stepson symptoms started Friday and tested positive Saturday. Headaches. Feeling tired. Doesn't smoke. No h/o COPD. Takes pepcid, hydroxizine, singulair, vit D supps, flonase. Last COVID shot 12/2021 (booster). Has had COVID before. Review of Systems   ROS in HPI    Vitals/Objective:     General: alert, cooperative, no distress   Mental  status: mental status: alert, oriented to person, place, and time, normal mood, behavior, speech, dress, motor activity, and thought processes   Resp: resp: normal effort and no respiratory distress   Neuro: neuro: no gross deficits   Skin: skin: no discoloration or lesions of concern on visible areas   Due to this being a TeleHealth evaluation, many elements of the physical examination are unable to be assessed. Assessment and Plan:     1. Upper respiratory tract infection, unspecified type: This is most likely COVID based on multiple exposures and presentation typical for the more benign COVID strains. If she does have COVID, she is low risk.  Ddx includes viral URI/sinusitis. Patient also desires flu testing; though the risk is low, we will oblige. - Increase flonase dosing to BID until symptoms resolve  - Rapid flu and COVID PCR ordered - patient will call ahead before she presents for testing  - Tylenol scheduled  - Tessalon PRN  - Honey for sore throat  - N95 until COVID test result is back       We discussed the expected course, resolution and complications of the diagnosis(es) in detail. Medication risks, benefits, costs, interactions, and alternatives were discussed as indicated. I advised her to contact the office if her condition worsens, changes or fails to improve as anticipated. She expressed understanding with the diagnosis(es) and plan. Patient understands that this encounter was a temporary measure, and the importance of further follow up and examination was emphasized. Patient verbalized understanding. Electronically Signed: Patrizia Pena MD    CPT Codes 83668-86951 for Established Patients may apply to this Telehealth Visit. POS code: 18. Modifier DAYNE Smyth is a 27 y.o. female who was evaluated by an audio-video encounter for concerns as above. Patient identification was verified prior to start of the visit. A caregiver was present when appropriate. Due to this being a TeleHealth encounter (During [place]-07 public health emergency), evaluation of the following organ systems was limited: Vitals/Constitutional/EENT/Resp/CV/GI//MS/Neuro/Skin/Heme-Lymph-Imm. Pursuant to the emergency declaration under the [facility], 1135 waiver authority and the [employer] Act, this Virtual Visit was conducted, with patient's (and/or legal guardian's) consent, to reduce the patient's risk of exposure to COVID-19 and provide necessary medical care.      Services were provided through a synchronous discussion virtually to substitute for in-person clinic visit. I was at home. The patient was at home. History   Patients past medical, surgical and family histories were reviewed and updated. Past Medical History:   Diagnosis Date    Headache(784.0)     IBS (irritable bowel syndrome)      Past Surgical History:   Procedure Laterality Date    HX WISDOM TEETH EXTRACTION  08/14/2015     Family History   Problem Relation Age of Onset    Hypertension Mother      Social History     Socioeconomic History    Marital status: SINGLE     Spouse name: Not on file    Number of children: Not on file    Years of education: Not on file    Highest education level: Not on file   Occupational History    Not on file   Tobacco Use    Smoking status: Never    Smokeless tobacco: Never   Vaping Use    Vaping Use: Never used   Substance and Sexual Activity    Alcohol use: Yes     Comment: occasionally    Drug use: No    Sexual activity: Yes     Partners: Male     Birth control/protection: Implant   Other Topics Concern    Not on file   Social History Narrative    Not on file     Social Determinants of Health     Financial Resource Strain: Not on file   Food Insecurity: Not on file   Transportation Needs: Not on file   Physical Activity: Not on file   Stress: Not on file   Social Connections: Not on file   Intimate Partner Violence: Not on file   Housing Stability: Not on file     Patient Active Problem List   Diagnosis Code    Vitamin D deficiency E55.9    IM (infectious mononucleosis) B27.90    Irritable bowel K58.9    Chronic bilateral low back pain without sciatica M54.50, G89.29          Current Medications/Allergies   Medications and Allergies reviewed:    Current Outpatient Medications   Medication Sig Dispense Refill    acetaminophen (TYLENOL) 500 mg tablet Take 2 Tablets by mouth three (3) times daily for 7 days. 42 Tablet 0    benzonatate (TESSALON) 100 mg capsule Take 1 Capsule by mouth three (3) times daily as needed for Cough for up to 7 days.  20 Capsule 0 methocarbamoL (ROBAXIN) 500 mg tablet Take 1 Tablet by mouth nightly as needed for Muscle Spasm(s). 15 Tablet 0    hydrOXYzine HCL (ATARAX) 25 mg tablet Take 1 Tablet by mouth every six (6) hours as needed for Itching. 360 Tablet 1    dicyclomine (BENTYL) 10 mg capsule Take 1 Capsule by mouth three (3) times daily as needed for Pain. 90 Capsule 3    fluticasone propionate (FLONASE) 50 mcg/actuation nasal spray 2 sprays per nostril once daily 1 Each 3    famotidine (PEPCID) 40 mg tablet Take 1 Tablet by mouth nightly. 90 Tablet 3    montelukast (SINGULAIR) 10 mg tablet Take 1 Tablet by mouth daily. 90 Tablet 3    cetirizine (ZYRTEC) 10 mg tablet Take 1 Tablet by mouth nightly. 90 Tablet 3    diazePAM (VALIUM) 2 mg tablet TAKE ONE TABLET BY MOUTH ONCE PRIOR TO FLYING 3 Tablet 0    Auvi-Q 0.3 mg/0.3 mL injection INJECT IN THE MUSCLE AS DIRECTED AS NEEDED      ondansetron (ZOFRAN ODT) 4 mg disintegrating tablet Take 1 Tab by mouth every eight (8) hours as needed for Nausea. 30 Tab 0    ibuprofen (MOTRIN) 800 mg tablet Take 1 Tab by mouth every eight (8) hours as needed for Pain. 30 Tab 0    cholecalciferol (VITAMIN D3) 1,000 unit tablet Take 1 Tab by mouth daily.  90 Tab 4     Allergies   Allergen Reactions    Sulfa (Sulfonamide Antibiotics) Hives

## 2023-02-17 LAB — SARS-COV-2 RNA RESP QL NAA+PROBE: NOT DETECTED

## 2023-03-03 ENCOUNTER — PATIENT MESSAGE (OUTPATIENT)
Dept: FAMILY MEDICINE CLINIC | Age: 33
End: 2023-03-03

## 2023-03-03 DIAGNOSIS — T36.95XA ANTIBIOTIC-INDUCED YEAST INFECTION: ICD-10-CM

## 2023-03-03 DIAGNOSIS — B37.9 ANTIBIOTIC-INDUCED YEAST INFECTION: ICD-10-CM

## 2023-03-06 RX ORDER — FLUCONAZOLE 150 MG/1
150 TABLET ORAL
Qty: 2 TABLET | Refills: 0 | Status: SHIPPED | OUTPATIENT
Start: 2023-03-06 | End: 2023-03-07

## 2023-03-06 NOTE — TELEPHONE ENCOUNTER
From: Alfonso Gaxiola  To: Carmita Archuleta MD  Sent: 3/3/2023 5:24 PM EST  Subject: Medication    Hello Dr. Spencer Liriano,    Can you please refill fluconazole x 2 doses. Im having some vaginal irritation and in need of an oral anti fungal. Ive taken fluconazole before, which clears it up. I would greatly appreciate it.     Thank you,    Cindy Iglesias

## 2023-03-10 ENCOUNTER — OFFICE VISIT (OUTPATIENT)
Dept: ENT CLINIC | Age: 33
End: 2023-03-10
Payer: COMMERCIAL

## 2023-03-10 VITALS
WEIGHT: 169 LBS | HEART RATE: 93 BPM | HEIGHT: 63 IN | RESPIRATION RATE: 18 BRPM | OXYGEN SATURATION: 98 % | SYSTOLIC BLOOD PRESSURE: 157 MMHG | BODY MASS INDEX: 29.95 KG/M2 | DIASTOLIC BLOOD PRESSURE: 99 MMHG

## 2023-03-10 DIAGNOSIS — R13.14 PHARYNGOESOPHAGEAL DYSPHAGIA: Primary | ICD-10-CM

## 2023-03-10 DIAGNOSIS — K21.9 GASTROESOPHAGEAL REFLUX DISEASE, UNSPECIFIED WHETHER ESOPHAGITIS PRESENT: ICD-10-CM

## 2023-03-10 PROCEDURE — 99203 OFFICE O/P NEW LOW 30 MIN: CPT | Performed by: OTOLARYNGOLOGY

## 2023-03-10 PROCEDURE — 31575 DIAGNOSTIC LARYNGOSCOPY: CPT | Performed by: OTOLARYNGOLOGY

## 2023-03-10 NOTE — PROGRESS NOTES
Otolaryngology-Head and Neck Surgery  New Patient Visit     Patient: Jie Bright  YOB: 1990  MRN: [id number]  Date of Service: 3/10/2023    Chief Complaint:   Chief Complaint   Patient presents with    New Patient    Heartburn     Has been for 2-3 years has gotten worst within the last 2-3 months       History of Present Illness: Jie Bright is a 27y.o. year old female who presents today for discussion of her throat    Describes chronic heartburn ongoing for years    Lately is worse    Was previously able to manage / control symptoms with diet changes    Takes H2 blocker daily for allergy related symptoms    Has noticed stress is worse and wonders if this is related    Has episodes of swallowing concerns  Solid food ok, but with liquid occ feels like goes down too quickly or down the wrong pipe    Past Medical History:  Past Medical History:   Diagnosis Date    Headache(784.0)     IBS (irritable bowel syndrome)        Past Surgical History:   Past Surgical History:   Procedure Laterality Date    HX WISDOM TEETH EXTRACTION  08/14/2015       Medications:   Current Outpatient Medications   Medication Instructions    Auvi-Q 0.3 mg/0.3 mL injection INJECT IN THE MUSCLE AS DIRECTED AS NEEDED    cetirizine (ZYRTEC) 10 mg, Oral, EVERY BEDTIME    cholecalciferol (VITAMIN D3) 1,000 Units, Oral, DAILY    diazePAM (VALIUM) 2 mg tablet TAKE ONE TABLET BY MOUTH ONCE PRIOR TO FLYING    dicyclomine (BENTYL) 10 mg, Oral, 3 TIMES DAILY AS NEEDED    famotidine (PEPCID) 40 mg, Oral, EVERY BEDTIME    fluticasone propionate (FLONASE) 50 mcg/actuation nasal spray SPRAY TWO SPRAYS IN EACH NOSTRIL ONCE DAILY    hydrOXYzine HCL (ATARAX) 25 mg, Oral, EVERY 6 HOURS AS NEEDED    ibuprofen (MOTRIN) 800 mg, Oral, EVERY 8 HOURS AS NEEDED    methocarbamoL (ROBAXIN) 500 mg, Oral, BEDTIME PRN    montelukast (SINGULAIR) 10 mg, Oral, DAILY    ondansetron (ZOFRAN ODT) 4 mg, Oral, EVERY 8 HOURS AS NEEDED       Allergies: Allergies   Allergen Reactions    Sulfa (Sulfonamide Antibiotics) Hives       Social History:   Social History     Tobacco Use    Smoking status: Never    Smokeless tobacco: Never   Vaping Use    Vaping Use: Never used   Substance Use Topics    Alcohol use: Yes     Comment: occasionally    Drug use: No        Family History:  Family History   Problem Relation Age of Onset    Hypertension Mother        Review of Systems:    Consitutional: denies fever, excessive weight gain or loss. Eyes: denies diplopia, eye pain. Integumentary: denies new concerning skin lesions. Ears, Nose, Mouth, Throat: denies except as per HPI. Endocrine: denies hot or cold intolerance, increased thirst.  Respiratory: denies cough, hemoptysis, wheezing  Gastrointestinal: denies trouble swallowing, nausea, emesis, regurgitation  Musculoskeletal: denies muscle weakness or wasting  Cardiovascular: denies chest pain, shortness of breath  Neurologic: denies seizures, numbness or tingling, syncope  Hematologic: denies easy bleeding or bruising    Physical Examination:   Vitals:    03/10/23 1429   BP: (!) 157/99   BP 1 Location: Right upper arm   BP Patient Position: Sitting   BP Cuff Size: Adult   Pulse: 93   Resp: 18   Height: 5' 3\" (1.6 m)   Weight: 169 lb (76.7 kg)   SpO2: 98%        General: Comfortable, pleasant, appears stated age  Voice: Strong, speaking in full sentences, no stridor    Face: No masses or lesions, facial strength symmetric   Ears: External ears unremarkable. Bilateral ear canal clear. Tympanic membrane clear and intact, with visible landmarks. Clear middle ear space  Nose: External nose unremarkable. Dorsum midline. Anterior rhinoscopy demonstrates no lesions. Septum midline. Turbinates without hypertrophy. Oral Cavity / Oropharynx: No trismus. Mucosa pink and moist. No lesions. Tongue is midline and mobile. Palate elevates symmetrically. Uvula midline. Tonsils unremarkable. Base of tongue soft. Floor of mouth soft. Neck: Supple. No adenopathy. Thyroid unremarkable. Palpable laryngeal landmarks. Full neck range of motion   Neurologic: CN II - XI intact. Normal gait    PROCEDURE: FLEXIBLE FIBEROPTIC LARYNGOSCOPY    Preoperative Diagnosis:  Dysphagia     Postoperative Diagnosis: Same    Procedure: Flexible Fiberoptic Laryngoscopy    Anesthesia: Topical 4% Lidocaine, Oxymetazoline    Description of Procedure: Verbal informed consent was obtained. After application of topical anesthetic and decongestant, the flexible fiberoptic endoscope was introduced to the patient's nare. It was passed through the nose and into the pharynx. The scope was then withdrawn and repeated on the opposing nare. The patient tolerated the procedure well. Findings: Normal nasal cavity, no polyposis or purulence. Middle meatus clear bilaterally. Nasopharynx without lesions. Base of tongue, vallecula & epiglottis unremarkable. Clear pyriform sinus. Vocal folds without lesions. Normal mobility. Visualized subglottis appears patent. Assessment and Plan:   Dysphagia  Globus  GERD  - Will Rx PPI 40 mg  - Add gaviscon  - Diet/lifestyle changes emphasized  - Scope reassuring  - Check barium swallow  - She has GI appt in June - keep this        The patient was instructed to return to clinic if no improvement or progression of symptoms. Signs to watch out for reviewed.       MD Darwin Vu [facility]  [address] [facility] [address]  [facility]  Office Phone: [phone]

## 2023-03-10 NOTE — PROGRESS NOTES
Chief Complaint   Patient presents with    New Patient    Heartburn     Has been for 2-3 years has gotten worst within the last 2-3 months       Visit Vitals  BP (!) 157/99 (BP 1 Location: Right upper arm, BP Patient Position: Sitting, BP Cuff Size: Adult)   Pulse 93   Resp 18   Ht 5' 3\" (1.6 m)   Wt 169 lb (76.7 kg)   SpO2 98%   BMI 29.94 kg/m²

## 2023-03-13 RX ORDER — OMEPRAZOLE 40 MG/1
40 CAPSULE, DELAYED RELEASE ORAL DAILY
Qty: 90 CAPSULE | Refills: 1 | Status: SHIPPED | OUTPATIENT
Start: 2023-03-13

## 2023-03-16 ENCOUNTER — HOSPITAL ENCOUNTER (EMERGENCY)
Age: 33
Discharge: HOME | End: 2023-03-17
Attending: STUDENT IN AN ORGANIZED HEALTH CARE EDUCATION/TRAINING PROGRAM
Payer: COMMERCIAL

## 2023-03-16 ENCOUNTER — APPOINTMENT (OUTPATIENT)
Dept: CT IMAGING | Age: 33
End: 2023-03-16
Attending: STUDENT IN AN ORGANIZED HEALTH CARE EDUCATION/TRAINING PROGRAM
Payer: COMMERCIAL

## 2023-03-16 DIAGNOSIS — G43.909 MIGRAINE WITHOUT STATUS MIGRAINOSUS, NOT INTRACTABLE, UNSPECIFIED MIGRAINE TYPE: Primary | ICD-10-CM

## 2023-03-16 LAB
BASOPHILS # BLD: 0.1 K/UL (ref 0–0.1)
BASOPHILS NFR BLD: 1 % (ref 0–1)
COMMENT, HOLDF: NORMAL
DIFFERENTIAL METHOD BLD: NORMAL
EOSINOPHIL # BLD: 0.3 K/UL (ref 0–0.4)
EOSINOPHIL NFR BLD: 3 % (ref 0–7)
ERYTHROCYTE [DISTWIDTH] IN BLOOD BY AUTOMATED COUNT: 13.3 % (ref 11.5–14.5)
HCT VFR BLD AUTO: 41 % (ref 35–47)
HGB BLD-MCNC: 14.1 G/DL (ref 11.5–16)
IMM GRANULOCYTES # BLD AUTO: 0 K/UL (ref 0–0.04)
IMM GRANULOCYTES NFR BLD AUTO: 0 % (ref 0–0.5)
LYMPHOCYTES # BLD: 2.6 K/UL (ref 0.8–3.5)
LYMPHOCYTES NFR BLD: 31 % (ref 12–49)
MCH RBC QN AUTO: 28.4 PG (ref 26–34)
MCHC RBC AUTO-ENTMCNC: 34.4 G/DL (ref 30–36.5)
MCV RBC AUTO: 82.5 FL (ref 80–99)
MONOCYTES # BLD: 1 K/UL (ref 0–1)
MONOCYTES NFR BLD: 12 % (ref 5–13)
NEUTS SEG # BLD: 4.5 K/UL (ref 1.8–8)
NEUTS SEG NFR BLD: 53 % (ref 32–75)
NRBC # BLD: 0 K/UL (ref 0–0.01)
NRBC BLD-RTO: 0 PER 100 WBC
PLATELET # BLD AUTO: 387 K/UL (ref 150–400)
PMV BLD AUTO: 9.3 FL (ref 8.9–12.9)
RBC # BLD AUTO: 4.97 M/UL (ref 3.8–5.2)
SAMPLES BEING HELD,HOLD: NORMAL
WBC # BLD AUTO: 8.5 K/UL (ref 3.6–11)

## 2023-03-16 PROCEDURE — 36415 COLL VENOUS BLD VENIPUNCTURE: CPT

## 2023-03-16 PROCEDURE — 80053 COMPREHEN METABOLIC PANEL: CPT

## 2023-03-16 PROCEDURE — 83735 ASSAY OF MAGNESIUM: CPT

## 2023-03-16 PROCEDURE — 85025 COMPLETE CBC W/AUTO DIFF WBC: CPT

## 2023-03-16 PROCEDURE — 96366 THER/PROPH/DIAG IV INF ADDON: CPT

## 2023-03-16 PROCEDURE — 96375 TX/PRO/DX INJ NEW DRUG ADDON: CPT

## 2023-03-16 PROCEDURE — 96365 THER/PROPH/DIAG IV INF INIT: CPT

## 2023-03-16 PROCEDURE — 74011250636 HC RX REV CODE- 250/636: Performed by: STUDENT IN AN ORGANIZED HEALTH CARE EDUCATION/TRAINING PROGRAM

## 2023-03-16 PROCEDURE — 70450 CT HEAD/BRAIN W/O DYE: CPT

## 2023-03-16 PROCEDURE — 99284 EMERGENCY DEPT VISIT MOD MDM: CPT

## 2023-03-16 RX ORDER — DIPHENHYDRAMINE HYDROCHLORIDE 50 MG/ML
12.5 INJECTION, SOLUTION INTRAMUSCULAR; INTRAVENOUS ONCE
Status: COMPLETED | OUTPATIENT
Start: 2023-03-16 | End: 2023-03-16

## 2023-03-16 RX ORDER — MAGNESIUM SULFATE HEPTAHYDRATE 40 MG/ML
2 INJECTION, SOLUTION INTRAVENOUS ONCE
Status: COMPLETED | OUTPATIENT
Start: 2023-03-16 | End: 2023-03-17

## 2023-03-16 RX ORDER — PROCHLORPERAZINE EDISYLATE 5 MG/ML
10 INJECTION INTRAMUSCULAR; INTRAVENOUS ONCE
Status: COMPLETED | OUTPATIENT
Start: 2023-03-16 | End: 2023-03-16

## 2023-03-16 RX ORDER — KETOROLAC TROMETHAMINE 30 MG/ML
15 INJECTION, SOLUTION INTRAMUSCULAR; INTRAVENOUS ONCE
Status: COMPLETED | OUTPATIENT
Start: 2023-03-16 | End: 2023-03-17

## 2023-03-16 RX ADMIN — MAGNESIUM SULFATE HEPTAHYDRATE 2 G: 40 INJECTION, SOLUTION INTRAVENOUS at 23:19

## 2023-03-16 RX ADMIN — SODIUM CHLORIDE 1000 ML: 9 INJECTION, SOLUTION INTRAVENOUS at 23:23

## 2023-03-16 RX ADMIN — DIPHENHYDRAMINE HYDROCHLORIDE 12.5 MG: 50 INJECTION, SOLUTION INTRAMUSCULAR; INTRAVENOUS at 23:20

## 2023-03-16 RX ADMIN — PROCHLORPERAZINE EDISYLATE 10 MG: 5 INJECTION INTRAMUSCULAR; INTRAVENOUS at 23:20

## 2023-03-16 NOTE — Clinical Note
[address]  [facility] EMERGENCY DEPT  [id number]  [phone]    Work/School Note    Date: 3/16/2023    To Whom It May concern:    Ariana Mart was seen and treated today in the emergency room by the following provider(s):  Attending Provider: Rajesh Mullen DO. Ariana Mart is excused from work/school on 03/17/23 and 03/18/23. She is medically clear to return to work/school on 3/19/2023.        Sincerely,          Shikha Novoa DO

## 2023-03-17 VITALS
HEART RATE: 82 BPM | HEIGHT: 63 IN | OXYGEN SATURATION: 96 % | BODY MASS INDEX: 26.93 KG/M2 | WEIGHT: 152 LBS | RESPIRATION RATE: 20 BRPM | SYSTOLIC BLOOD PRESSURE: 138 MMHG | DIASTOLIC BLOOD PRESSURE: 58 MMHG | TEMPERATURE: 98 F

## 2023-03-17 LAB
ALBUMIN SERPL-MCNC: 4 G/DL (ref 3.5–5)
ALBUMIN/GLOB SERPL: 1.1 (ref 1.1–2.2)
ALP SERPL-CCNC: 63 U/L (ref 45–117)
ALT SERPL-CCNC: 50 U/L (ref 12–78)
ANION GAP SERPL CALC-SCNC: 2 MMOL/L (ref 5–15)
AST SERPL-CCNC: 27 U/L (ref 15–37)
BILIRUB SERPL-MCNC: 0.4 MG/DL (ref 0.2–1)
BUN SERPL-MCNC: 9 MG/DL (ref 6–20)
BUN/CREAT SERPL: 10 (ref 12–20)
CALCIUM SERPL-MCNC: 9 MG/DL (ref 8.5–10.1)
CHLORIDE SERPL-SCNC: 107 MMOL/L (ref 97–108)
CO2 SERPL-SCNC: 30 MMOL/L (ref 21–32)
CREAT SERPL-MCNC: 0.91 MG/DL (ref 0.55–1.02)
GLOBULIN SER CALC-MCNC: 3.7 G/DL (ref 2–4)
GLUCOSE SERPL-MCNC: 119 MG/DL (ref 65–100)
HCG UR QL: NEGATIVE
MAGNESIUM SERPL-MCNC: 2 MG/DL (ref 1.6–2.4)
POTASSIUM SERPL-SCNC: 3.6 MMOL/L (ref 3.5–5.1)
PROT SERPL-MCNC: 7.7 G/DL (ref 6.4–8.2)
SODIUM SERPL-SCNC: 139 MMOL/L (ref 136–145)

## 2023-03-17 PROCEDURE — 96375 TX/PRO/DX INJ NEW DRUG ADDON: CPT

## 2023-03-17 PROCEDURE — 74011250636 HC RX REV CODE- 250/636: Performed by: STUDENT IN AN ORGANIZED HEALTH CARE EDUCATION/TRAINING PROGRAM

## 2023-03-17 PROCEDURE — 81025 URINE PREGNANCY TEST: CPT

## 2023-03-17 RX ORDER — BUTALBITAL, ACETAMINOPHEN AND CAFFEINE 300; 40; 50 MG/1; MG/1; MG/1
1 CAPSULE ORAL
Qty: 15 CAPSULE | Refills: 0 | Status: SHIPPED | OUTPATIENT
Start: 2023-03-17

## 2023-03-17 RX ADMIN — KETOROLAC TROMETHAMINE 15 MG: 30 INJECTION, SOLUTION INTRAMUSCULAR; INTRAVENOUS at 00:13

## 2023-03-17 NOTE — ED PROVIDER NOTES
HPI     Date of Service:  3/16/2023    Patient:  Romelia Doan    Chief Complaint:  Headache, Blurred Vision, and Nausea       HPI:  Romelia Doan is a 27 y.o.  female with no significant past medical history who presents for evaluation of a headache. Patient notes earlier today she was experiencing bilateral eye discomfort and feeling of heaviness, subsequently put on a cool eye mask, took an allergy medication and Tylenol then went to sleep. Notes when she woke up and took a eye mask off she felt like her eyes were irritated, tearing and had a blurry vision. States she rinsed her eyes and used a eyedrop with some relief of symptoms. She subsequently developed a occipital headache with associated nausea and lightheadedness. States she has been in her normal state of health otherwise, no recent illness including fevers, cough or congestion. No vomiting or diarrhea.       Past Medical History:   Diagnosis Date    Headache(784.0)     IBS (irritable bowel syndrome)        Past Surgical History:   Procedure Laterality Date    HX WISDOM TEETH EXTRACTION  08/14/2015         Family History:   Problem Relation Age of Onset    Hypertension Mother        Social History     Socioeconomic History    Marital status: SINGLE     Spouse name: Not on file    Number of children: Not on file    Years of education: Not on file    Highest education level: Not on file   Occupational History    Not on file   Tobacco Use    Smoking status: Never    Smokeless tobacco: Never   Vaping Use    Vaping Use: Never used   Substance and Sexual Activity    Alcohol use: Yes     Comment: occasionally    Drug use: No    Sexual activity: Yes     Partners: Male     Birth control/protection: Implant   Other Topics Concern    Not on file   Social History Narrative    Not on file     Social Determinants of Health     Financial Resource Strain: Not on file   Food Insecurity: Not on file   Transportation Needs: Not on file   Physical Activity: Not on file   Stress: Not on file   Social Connections: Not on file   Intimate Partner Violence: Not on file   Housing Stability: Not on file         ALLERGIES: Sulfa (sulfonamide antibiotics)    Review of Systems   Constitutional:  Negative for chills and fever. HENT:  Negative for congestion and rhinorrhea. Eyes:  Positive for pain and visual disturbance. Negative for discharge and redness. Respiratory:  Negative for cough and shortness of breath. Cardiovascular:  Negative for chest pain. Gastrointestinal:  Negative for abdominal pain, diarrhea, nausea and vomiting. Neurological:  Positive for light-headedness and headaches. Psychiatric/Behavioral:  Negative for agitation and confusion. Vitals:    03/16/23 2250 03/16/23 2255   BP: (!) 166/97    Pulse: (!) 108 (!) 108   Resp: 18    Temp: 98 °F (36.7 °C)    SpO2: 100%    Weight: 68.9 kg (152 lb)    Height: 5' 3\" (1.6 m)             Physical Exam  Vitals and nursing note reviewed. Constitutional:       General: She is in acute distress. Appearance: She is not ill-appearing or toxic-appearing. HENT:      Head: Normocephalic. Eyes:      General: No scleral icterus. Right eye: No discharge. Left eye: No discharge. Conjunctiva/sclera: Conjunctivae normal.   Cardiovascular:      Rate and Rhythm: Normal rate. Pulses: Normal pulses. Pulmonary:      Effort: Pulmonary effort is normal. No respiratory distress. Abdominal:      Palpations: Abdomen is soft. Tenderness: There is no abdominal tenderness. Musculoskeletal:         General: Normal range of motion. Skin:     General: Skin is warm and dry. Capillary Refill: Capillary refill takes less than 2 seconds. Neurological:      General: No focal deficit present. Mental Status: She is alert and oriented to person, place, and time. Cranial Nerves: No cranial nerve deficit. Sensory: No sensory deficit. Motor: No weakness.    Psychiatric: Mood and Affect: Mood normal.         Behavior: Behavior normal.        Medical Decision Making      DECISION MAKING:  Jie Bright is a 27 y.o. female who comes in as above. On arrival, blood pressure is 166/97, heart rate 108, vital signs otherwise stable. On my examination she does appear mildly uncomfortable secondary to pain, there is photophobia. She is otherwise neurologically intact with cranial nerves II through XII intact, no motor or sensory deficits. Her visual acuity is intact. Given sudden onset nature of the headache will evaluate with CT imaging of the head to rule out bleed. Low suspicion for ischemic CVA as symptoms do not particularly fit and she has no focal neurological deficits. We will treat headache with IV fluids, Compazine, Benadryl, magnesium and Toradol. CBC without leukocytosis or anemia. Electrolytes, kidney function and LFTs within normal limits. CT imaging of the head is unremarkable. On reevaluation after medication, her headache and symptoms have significantly improved. Blood pressure has improved to 138/58. Discussed results with patient. Discussed plan for discharge with a prescription for Fioricet, neurology follow-up should headache return and follow-up with her PCP. ER return precautions discussed. Patient verbalized understanding will be discharged home. Amount and/or Complexity of Data Reviewed  Labs: ordered. Decision-making details documented in ED Course. Radiology: ordered. Decision-making details documented in ED Course. Risk  Prescription drug management.            Procedures    LABS:  Recent Results (from the past 6 hour(s))   CBC WITH AUTOMATED DIFF    Collection Time: 03/16/23 11:25 PM   Result Value Ref Range    WBC 8.5 3.6 - 11.0 K/uL    RBC 4.97 3.80 - 5.20 M/uL    HGB 14.1 11.5 - 16.0 g/dL    HCT 41.0 35.0 - 47.0 %    MCV 82.5 80.0 - 99.0 FL    MCH 28.4 26.0 - 34.0 PG    MCHC 34.4 30.0 - 36.5 g/dL    RDW 13.3 11.5 - 14.5 % PLATELET 522 804 - 455 K/uL    MPV 9.3 8.9 - 12.9 FL    NRBC 0.0 0  WBC    ABSOLUTE NRBC 0.00 0.00 - 0.01 K/uL    NEUTROPHILS 53 32 - 75 %    LYMPHOCYTES 31 12 - 49 %    MONOCYTES 12 5 - 13 %    EOSINOPHILS 3 0 - 7 %    BASOPHILS 1 0 - 1 %    IMMATURE GRANULOCYTES 0 0.0 - 0.5 %    ABS. NEUTROPHILS 4.5 1.8 - 8.0 K/UL    ABS. LYMPHOCYTES 2.6 0.8 - 3.5 K/UL    ABS. MONOCYTES 1.0 0.0 - 1.0 K/UL    ABS. EOSINOPHILS 0.3 0.0 - 0.4 K/UL    ABS. BASOPHILS 0.1 0.0 - 0.1 K/UL    ABS. IMM. GRANS. 0.0 0.00 - 0.04 K/UL    DF AUTOMATED     METABOLIC PANEL, COMPREHENSIVE    Collection Time: 03/16/23 11:25 PM   Result Value Ref Range    Sodium 139 136 - 145 mmol/L    Potassium 3.6 3.5 - 5.1 mmol/L    Chloride 107 97 - 108 mmol/L    CO2 30 21 - 32 mmol/L    Anion gap 2 (L) 5 - 15 mmol/L    Glucose 119 (H) 65 - 100 mg/dL    BUN 9 6 - 20 MG/DL    Creatinine 0.91 0.55 - 1.02 MG/DL    BUN/Creatinine ratio 10 (L) 12 - 20      eGFR >60 >60 ml/min/1.73m2    Calcium 9.0 8.5 - 10.1 MG/DL    Bilirubin, total 0.4 0.2 - 1.0 MG/DL    ALT (SGPT) 50 12 - 78 U/L    AST (SGOT) 27 15 - 37 U/L    Alk. phosphatase 63 45 - 117 U/L    Protein, total 7.7 6.4 - 8.2 g/dL    Albumin 4.0 3.5 - 5.0 g/dL    Globulin 3.7 2.0 - 4.0 g/dL    A-G Ratio 1.1 1.1 - 2.2     MAGNESIUM    Collection Time: 03/16/23 11:25 PM   Result Value Ref Range    Magnesium 2.0 1.6 - 2.4 mg/dL   SAMPLES BEING HELD    Collection Time: 03/16/23 11:25 PM   Result Value Ref Range    SAMPLES BEING HELD 1BLU,1RED,1SST     COMMENT        Add-on orders for these samples will be processed based on acceptable specimen integrity and analyte stability, which may vary by analyte. HCG URINE, QL. - POC    Collection Time: 03/17/23 12:16 AM   Result Value Ref Range    Pregnancy test,urine (POC) Negative NEG          IMAGING:  CT HEAD WO CONT   Final Result   No acute findings.             Medications During Visit:  Medications   sodium chloride 0.9 % bolus infusion 1,000 mL (0 mL IntraVENous IV Completed 3/17/23 0050)   diphenhydrAMINE (BENADRYL) injection 12.5 mg (12.5 mg IntraVENous Given 3/16/23 2320)   prochlorperazine (COMPAZINE) injection 10 mg (10 mg IntraVENous Given 3/16/23 2320)   magnesium sulfate 2 g/50 ml IVPB (premix or compounded) (0 g IntraVENous IV Completed 3/17/23 0050)   ketorolac (TORADOL) injection 15 mg (15 mg IntraVENous Given 3/17/23 0013)         IMPRESSION:  1. Migraine without status migrainosus, not intractable, unspecified migraine type        DISPOSITION:  Discharged      Discharge Medication List as of 3/17/2023 12:58 AM        START taking these medications    Details   butalbital-acetaminophen-caff (FIORICET) -40 mg per capsule Take 1 Capsule by mouth every four (4) hours as needed for Headache., Normal, Disp-15 Capsule, R-0           CONTINUE these medications which have NOT CHANGED    Details   omeprazole (PRILOSEC) 40 mg capsule Take 1 Capsule by mouth daily. , Normal, Disp-90 Capsule, R-1      aluminum hydrox-magnesium carb (Gaviscon)  mg/15 mL suspension Take 15 mL by mouth nightly as needed for Indigestion. , Normal, Disp-1 Each, R-0      fluticasone propionate (FLONASE) 50 mcg/actuation nasal spray SPRAY TWO SPRAYS IN EACH NOSTRIL ONCE DAILY, Normal, Disp-16 g, R-3      hydrOXYzine HCL (ATARAX) 25 mg tablet Take 1 Tablet by mouth every six (6) hours as needed for Itching., Normal, Disp-360 Tablet, R-1      methocarbamoL (ROBAXIN) 500 mg tablet Take 1 Tablet by mouth nightly as needed for Muscle Spasm(s). , Normal, Disp-15 Tablet, R-0      dicyclomine (BENTYL) 10 mg capsule Take 1 Capsule by mouth three (3) times daily as needed for Pain., Normal, Disp-90 Capsule, R-3      famotidine (PEPCID) 40 mg tablet Take 1 Tablet by mouth nightly., Normal, Disp-90 Tablet, R-3      montelukast (SINGULAIR) 10 mg tablet Take 1 Tablet by mouth daily. , Normal, Disp-90 Tablet, R-3      cetirizine (ZYRTEC) 10 mg tablet Take 1 Tablet by mouth nightly., Normal, Disp-90 Tablet, R-3      diazePAM (VALIUM) 2 mg tablet TAKE ONE TABLET BY MOUTH ONCE PRIOR TO FLYING, Normal, Disp-3 Tablet, R-0      Auvi-Q 0.3 mg/0.3 mL injection INJECT IN THE MUSCLE AS DIRECTED AS NEEDED, Historical Med, EJ      ondansetron (ZOFRAN ODT) 4 mg disintegrating tablet Take 1 Tab by mouth every eight (8) hours as needed for Nausea., Normal, Disp-30 Tab, R-0      ibuprofen (MOTRIN) 800 mg tablet Take 1 Tab by mouth every eight (8) hours as needed for Pain., Normal, Disp-30 Tab, R-0      cholecalciferol (VITAMIN D3) 1,000 unit tablet Take 1 Tab by mouth daily. , Normal, Disp-90 Tab, R-4              Follow-up Information       Follow up With Specialties Details Why Contact Info    Telma Stallworth MD Family Medicine Schedule an appointment as soon as possible for a visit   [address] [address]  [phone]      Misty Pat MD Neurology Schedule an appointment as soon as possible for a visit   [address] [phone]      [facility] EMERGENCY DEPT Emergency Medicine  If symptoms worsen [address] [facility]  [phone]              The patient is asked to follow-up with their primary care provider in the next several days. They are to call tomorrow for an appointment. The patient is asked to return promptly for any increased concerns or worsening of symptoms. They can return to this emergency department or any other emergency department.       Nafisa Raymond,

## 2023-03-17 NOTE — ED TRIAGE NOTES
Pt presents to the ED with c/o blurred vision, nausea and a headache. Pt states that around 8 PM her eyes began to hurt and she tried to use eyedrops with no relief. Pt states that after that she began to have some blurry vision in both eyes, then started to have an aching pain in the back of her head. Pt also reports that she is feeling nauseous, states \"I just don't feel well\". Pt denies any unilateral weakness and/or slurred speech.

## 2023-03-28 ENCOUNTER — HOSPITAL ENCOUNTER (OUTPATIENT)
Dept: GENERAL RADIOLOGY | Age: 33
Discharge: HOME | End: 2023-03-28
Attending: OTOLARYNGOLOGY
Payer: OTHER GOVERNMENT

## 2023-03-28 DIAGNOSIS — R13.14 PHARYNGOESOPHAGEAL DYSPHAGIA: ICD-10-CM

## 2023-03-28 PROCEDURE — 74220 X-RAY XM ESOPHAGUS 1CNTRST: CPT

## 2023-03-30 NOTE — PROGRESS NOTES
Can you let patient know her barium swallow test looks normal and is reassuring    We have a follow up next month, so we can continue medical management and I will see her then

## 2023-04-21 ENCOUNTER — PATIENT MESSAGE (OUTPATIENT)
Dept: FAMILY MEDICINE CLINIC | Age: 33
End: 2023-04-21

## 2023-04-21 ENCOUNTER — OFFICE VISIT (OUTPATIENT)
Dept: ENT CLINIC | Age: 33
End: 2023-04-21

## 2023-04-21 VITALS
DIASTOLIC BLOOD PRESSURE: 88 MMHG | HEIGHT: 63 IN | HEART RATE: 108 BPM | WEIGHT: 152 LBS | SYSTOLIC BLOOD PRESSURE: 148 MMHG | OXYGEN SATURATION: 98 % | BODY MASS INDEX: 26.93 KG/M2

## 2023-04-21 DIAGNOSIS — K21.9 GASTROESOPHAGEAL REFLUX DISEASE, UNSPECIFIED WHETHER ESOPHAGITIS PRESENT: ICD-10-CM

## 2023-04-21 DIAGNOSIS — J35.8 TONSIL STONE: ICD-10-CM

## 2023-04-21 DIAGNOSIS — R09.82 POST-NASAL DRAINAGE: ICD-10-CM

## 2023-04-21 DIAGNOSIS — R13.14 PHARYNGOESOPHAGEAL DYSPHAGIA: Primary | ICD-10-CM

## 2023-04-21 RX ORDER — OMEPRAZOLE 20 MG/1
20 CAPSULE, DELAYED RELEASE ORAL DAILY
Qty: 90 CAPSULE | Refills: 1 | Status: SHIPPED | OUTPATIENT
Start: 2023-04-21

## 2023-04-21 RX ORDER — AZELASTINE 1 MG/ML
1 SPRAY, METERED NASAL 2 TIMES DAILY
Qty: 1 EACH | Refills: 1 | Status: SHIPPED | OUTPATIENT
Start: 2023-04-21

## 2023-04-21 NOTE — PROGRESS NOTES
Otolaryngology-Head and Neck Surgery  Follow Up Patient Visit     Patient: Delma Wilson  YOB: 1990  MRN: [id number]  Date of Service: 4/21/2023    Chief Complaint:   Chief Complaint   Patient presents with    Follow-up     Reflux and swallowing        Interval hx 4/21/2023  Throat pain better   Heartburn better    Does cont to feel trouble with drinking water, feels like it goes down too fast    Also notes history of snoring - has had prior PSG which was negative    Also has history of tonsil stones     History of Present Illness: Delma Wilson is a 27y.o. year old female who presents today for discussion of her throat    Describes chronic heartburn ongoing for years    Lately is worse    Was previously able to manage / control symptoms with diet changes    Takes H2 blocker daily for allergy related symptoms    Has noticed stress is worse and wonders if this is related    Has episodes of swallowing concerns  Solid food ok, but with liquid occ feels like goes down too quickly or down the wrong pipe    Past Medical History:  Past Medical History:   Diagnosis Date    Headache(784.0)     History of multiple allergies     IBS (irritable bowel syndrome)     Reflux gastritis     Sinus problem        Past Surgical History:   Past Surgical History:   Procedure Laterality Date    HX WISDOM TEETH EXTRACTION  08/14/2015       Medications:   Current Outpatient Medications   Medication Instructions    aluminum hydrox-magnesium carb (Gaviscon)  mg/15 mL suspension 15 mL, Oral, BEDTIME PRN    Auvi-Q 0.3 mg/0.3 mL injection INJECT IN THE MUSCLE AS DIRECTED AS NEEDED    butalbital-acetaminophen-caff (FIORICET) -40 mg per capsule 1 Capsule, Oral, EVERY 4 HOURS AS NEEDED    cetirizine (ZYRTEC) 10 mg, Oral, EVERY BEDTIME    cholecalciferol (VITAMIN D3) 1,000 Units, Oral, DAILY    diazePAM (VALIUM) 2 mg tablet TAKE ONE TABLET BY MOUTH ONCE PRIOR TO FLYING    dicyclomine (BENTYL) 10 mg, Oral, 3 TIMES DAILY AS NEEDED    famotidine (PEPCID) 40 mg, Oral, EVERY BEDTIME    fluticasone propionate (FLONASE) 50 mcg/actuation nasal spray SPRAY TWO SPRAYS IN EACH NOSTRIL ONCE DAILY    hydrOXYzine HCL (ATARAX) 25 mg, Oral, EVERY 6 HOURS AS NEEDED    ibuprofen (MOTRIN) 800 mg, Oral, EVERY 8 HOURS AS NEEDED    methocarbamoL (ROBAXIN) 500 mg, Oral, BEDTIME PRN    montelukast (SINGULAIR) 10 mg, Oral, DAILY    omeprazole (PRILOSEC) 40 mg, Oral, DAILY    ondansetron (ZOFRAN ODT) 4 mg, Oral, EVERY 8 HOURS AS NEEDED       Allergies: Allergies   Allergen Reactions    Sulfa (Sulfonamide Antibiotics) Hives       Social History:   Social History     Tobacco Use    Smoking status: Never    Smokeless tobacco: Never   Vaping Use    Vaping Use: Never used   Substance Use Topics    Alcohol use: Not Currently     Comment: Very rare, Occasionally    Drug use: No        Family History:  Family History   Problem Relation Age of Onset    Hypertension Mother        Review of Systems:    Consitutional: denies fever, excessive weight gain or loss. Eyes: denies diplopia, eye pain. Integumentary: denies new concerning skin lesions. Ears, Nose, Mouth, Throat: denies except as per HPI.   Endocrine: denies hot or cold intolerance, increased thirst.  Respiratory: denies cough, hemoptysis, wheezing  Gastrointestinal: denies trouble swallowing, nausea, emesis, regurgitation  Musculoskeletal: denies muscle weakness or wasting  Cardiovascular: denies chest pain, shortness of breath  Neurologic: denies seizures, numbness or tingling, syncope  Hematologic: denies easy bleeding or bruising    Physical Examination:   Vitals:    04/21/23 1429   BP: (!) 148/88   BP 1 Location: Right upper arm   BP Patient Position: Sitting   BP Cuff Size: Adult   Pulse: (!) 108   Height: 5' 3\" (1.6 m)   Weight: 152 lb (68.9 kg)   SpO2: 98%        General: Comfortable, pleasant, appears stated age  Voice: Strong, speaking in full sentences, no stridor    Face: No masses or lesions, facial strength symmetric   Ears: External ears unremarkable. Bilateral ear canal clear. Tympanic membrane clear and intact, with visible landmarks. Clear middle ear space  Nose: External nose unremarkable. Dorsum midline. Anterior rhinoscopy demonstrates no lesions. Septum midline. Turbinates without hypertrophy. Oral Cavity / Oropharynx: No trismus. Mucosa pink and moist. No lesions. Tongue is midline and mobile. Palate elevates symmetrically. Uvula midline. Tonsils unremarkable. Base of tongue soft. Floor of mouth soft. Neck: Supple. No adenopathy. Thyroid unremarkable. Palpable laryngeal landmarks. Full neck range of motion   Neurologic: CN II - XI intact. Normal gait    Barium swallow 3/2023     IMPRESSION  Normal esophagram.      Assessment and Plan:   Dysphagia  Globus  GERD  - Better with PPI and gaviscon  - Will reduce PPI to 20 mg   - She has GI appt next month   - Otherwise reviewed barium swallow normal    4. Tonsil stones  - Discussed management   - Try and avoid tonsil surgery if able  - Add astelin nasal spray in case improvement in allergies/PND improves symptoms    5. Snoring  - Has had prior PSG years ago which was normal  - Discussed snoring management. Consider oral appliance        The patient was instructed to return to clinic if no improvement or progression of symptoms. Signs to watch out for reviewed.       MD Juan Manuel Toscanoova [facility]  [address] [place] [address] [address]  [facility]  Office Phone: [phone]

## 2023-04-24 NOTE — TELEPHONE ENCOUNTER
From: Tiffany Allison  To: Félix Brenner MD  Sent: 4/21/2023 12:07 PM EDT  Subject: Medication    Hello Dr. Jake Wilson all is well. I was seen in the [facility] ER last month for a really bad migraine (never had a migraine in my life) and they prescribed me fiorcet with no refills. This medication helps a lot whenever I have a migraine. Can you please fill this medication so Im able to refill it as needed. Thank you!     Hope you have an amazing weekend,    Zita Patricio

## 2023-04-25 RX ORDER — BUTALBITAL, ACETAMINOPHEN AND CAFFEINE 300; 40; 50 MG/1; MG/1; MG/1
1 CAPSULE ORAL
Qty: 30 CAPSULE | Refills: 0 | Status: SHIPPED | OUTPATIENT
Start: 2023-04-25

## 2023-05-08 ENCOUNTER — OFFICE VISIT (OUTPATIENT)
Age: 33
End: 2023-05-08
Payer: COMMERCIAL

## 2023-05-08 VITALS — RESPIRATION RATE: 16 BRPM | OXYGEN SATURATION: 98 %

## 2023-05-08 DIAGNOSIS — S93.402A SPRAIN OF LEFT ANKLE, UNSPECIFIED LIGAMENT, INITIAL ENCOUNTER: Primary | ICD-10-CM

## 2023-05-08 DIAGNOSIS — M25.572 ACUTE LEFT ANKLE PAIN: ICD-10-CM

## 2023-05-08 PROCEDURE — 99213 OFFICE O/P EST LOW 20 MIN: CPT | Performed by: FAMILY MEDICINE

## 2023-05-08 RX ORDER — AZELASTINE HYDROCHLORIDE 137 UG/1
SPRAY, METERED NASAL
COMMUNITY
Start: 2023-04-21

## 2023-05-09 NOTE — PROGRESS NOTES
mouth daily      ondansetron (ZOFRAN-ODT) 4 MG disintegrating tablet Take 1 tablet by mouth every 8 hours as needed       No current facility-administered medications for this visit.      Allergies   Allergen Reactions    Penicillins     Sulfa Antibiotics Hives

## 2023-05-13 NOTE — TELEPHONE ENCOUNTER
Patient notified of lab results. Patient requesting to speak with physician about lab results. Advised patient a message will be sent regarding request to speak with physician. Patient verbalized understanding. no

## 2023-07-28 ENCOUNTER — OFFICE VISIT (OUTPATIENT)
Age: 33
End: 2023-07-28
Payer: OTHER GOVERNMENT

## 2023-07-28 VITALS — HEIGHT: 63 IN | BODY MASS INDEX: 26.93 KG/M2

## 2023-07-28 DIAGNOSIS — R06.83 SNORING: ICD-10-CM

## 2023-07-28 DIAGNOSIS — J35.8 TONSIL STONE: ICD-10-CM

## 2023-07-28 DIAGNOSIS — J35.01 CHRONIC TONSILLITIS: ICD-10-CM

## 2023-07-28 DIAGNOSIS — R13.14 DYSPHAGIA, PHARYNGOESOPHAGEAL PHASE: Primary | ICD-10-CM

## 2023-07-28 PROCEDURE — 99213 OFFICE O/P EST LOW 20 MIN: CPT | Performed by: OTOLARYNGOLOGY

## 2023-07-28 NOTE — PROGRESS NOTES
Otolaryngology-Head and Neck Surgery  Follow Up Patient Visit     Patient: Ramon Marx  YOB: 1990  MRN: [id number]  Date of Service: 7/28/2023    Chief Complaint:   Chief Complaint   Patient presents with    Follow-up     Reflux and swallowing          Interval hx 7/28/2023  Seen by GI, reflux / bowel issues are better    Astelin nasal spray is helpful for allergies but taste of medication hard to tolerate     Prior allergy shots - did not tolerate well   Few years ago   Did for few months only due to issues with reaction at injection site     Also continues to have tonsil stones  Water pik too harsh for her throat     Interval hx 4/21/2023  Throat pain better   Heartburn better    Does cont to feel trouble with drinking water, feels like it goes down too fast    Also notes history of snoring - has had prior PSG which was negative    Also has history of tonsil stones     History of Present Illness: Ramon Marx is a 27y.o. year old female who presents today for discussion of her throat    Describes chronic heartburn ongoing for years    Lately is worse    Was previously able to manage / control symptoms with diet changes    Takes H2 blocker daily for allergy related symptoms    Has noticed stress is worse and wonders if this is related    Has episodes of swallowing concerns  Solid food ok, but with liquid occ feels like goes down too quickly or down the wrong pipe    Past Medical History:  Past Medical History:   Diagnosis Date    Headache(784.0)     History of multiple allergies     IBS (irritable bowel syndrome)     Reflux gastritis     Sinus problem        Past Surgical History:   Past Surgical History:   Procedure Laterality Date    HX WISDOM TEETH EXTRACTION  08/14/2015       Medications:   Current Outpatient Medications   Medication Instructions    aluminum hydrox-magnesium carb (Gaviscon)  mg/15 mL suspension 15 mL, Oral, BEDTIME PRN    Auvi-Q 0.3 mg/0.3 mL injection INJECT IN THE

## 2023-08-14 NOTE — TELEPHONE ENCOUNTER
Medication Refill Request    Diony Evangelista is requesting a refill of the following medication(s):   Requested Prescriptions     Pending Prescriptions Disp Refills    montelukast (SINGULAIR) 10 MG tablet [Pharmacy Med Name: MONTELUKAST SOD 10 MG TABLET] 30 tablet      Sig: TAKE ONE TABLET BY MOUTH DAILY    famotidine (PEPCID) 40 MG tablet [Pharmacy Med Name: FAMOTIDINE 40 MG TABLET] 30 tablet      Sig: TAKE ONE TABLET BY MOUTH ONCE NIGHTLY      Last provider to prescribe medication: Dr. Scar Wright  Last Date of Medication Prescribed: 08/31/2022   Last Office Visit Date:05/08/2023 Dr Christy Christiansen- Same Day Appointment     Please send refill to:    [facility] [id number] - [place], [address] [place] [address]  Julia Doherty [id number]  Phone: [phone] Fax: [phone]

## 2023-08-15 RX ORDER — FAMOTIDINE 40 MG/1
TABLET, FILM COATED ORAL
Qty: 90 TABLET | Refills: 3 | Status: SHIPPED | OUTPATIENT
Start: 2023-08-15

## 2023-08-15 RX ORDER — MONTELUKAST SODIUM 10 MG/1
TABLET ORAL
Qty: 90 TABLET | Refills: 3 | Status: SHIPPED | OUTPATIENT
Start: 2023-08-15

## 2023-09-20 ENCOUNTER — OFFICE VISIT (OUTPATIENT)
Age: 33
End: 2023-09-20
Payer: OTHER GOVERNMENT

## 2023-09-20 VITALS
WEIGHT: 180 LBS | SYSTOLIC BLOOD PRESSURE: 147 MMHG | OXYGEN SATURATION: 98 % | RESPIRATION RATE: 16 BRPM | BODY MASS INDEX: 31.89 KG/M2 | HEART RATE: 80 BPM | HEIGHT: 63 IN | DIASTOLIC BLOOD PRESSURE: 85 MMHG

## 2023-09-20 DIAGNOSIS — R73.9 ELEVATED BLOOD SUGAR: ICD-10-CM

## 2023-09-20 DIAGNOSIS — Z01.84 IMMUNITY STATUS TESTING: ICD-10-CM

## 2023-09-20 DIAGNOSIS — R03.0 WHITE COAT SYNDROME WITH HIGH BLOOD PRESSURE BUT WITHOUT HYPERTENSION: ICD-10-CM

## 2023-09-20 DIAGNOSIS — R03.0 ELEVATED BLOOD PRESSURE READING WITHOUT DIAGNOSIS OF HYPERTENSION: Primary | ICD-10-CM

## 2023-09-20 DIAGNOSIS — K58.0 IRRITABLE BOWEL SYNDROME WITH DIARRHEA: ICD-10-CM

## 2023-09-20 PROBLEM — K52.9 INFLAMMATORY BOWEL DISEASE: Status: ACTIVE | Noted: 2023-08-30

## 2023-09-20 PROBLEM — K21.9 GERD (GASTROESOPHAGEAL REFLUX DISEASE): Status: ACTIVE | Noted: 2023-08-30

## 2023-09-20 PROCEDURE — 99214 OFFICE O/P EST MOD 30 MIN: CPT | Performed by: FAMILY MEDICINE

## 2023-09-20 RX ORDER — DICYCLOMINE HYDROCHLORIDE 10 MG/1
10 CAPSULE ORAL 3 TIMES DAILY PRN
Qty: 120 CAPSULE | Refills: 1 | Status: SHIPPED | OUTPATIENT
Start: 2023-09-20

## 2023-09-20 SDOH — ECONOMIC STABILITY: HOUSING INSECURITY
IN THE LAST 12 MONTHS, WAS THERE A TIME WHEN YOU DID NOT HAVE A STEADY PLACE TO SLEEP OR SLEPT IN A SHELTER (INCLUDING NOW)?: NO

## 2023-09-20 SDOH — ECONOMIC STABILITY: FOOD INSECURITY: WITHIN THE PAST 12 MONTHS, YOU WORRIED THAT YOUR FOOD WOULD RUN OUT BEFORE YOU GOT MONEY TO BUY MORE.: NEVER TRUE

## 2023-09-20 SDOH — ECONOMIC STABILITY: INCOME INSECURITY: HOW HARD IS IT FOR YOU TO PAY FOR THE VERY BASICS LIKE FOOD, HOUSING, MEDICAL CARE, AND HEATING?: NOT HARD AT ALL

## 2023-09-20 SDOH — ECONOMIC STABILITY: FOOD INSECURITY: WITHIN THE PAST 12 MONTHS, THE FOOD YOU BOUGHT JUST DIDN'T LAST AND YOU DIDN'T HAVE MONEY TO GET MORE.: NEVER TRUE

## 2023-09-20 ASSESSMENT — PATIENT HEALTH QUESTIONNAIRE - PHQ9
SUM OF ALL RESPONSES TO PHQ QUESTIONS 1-9: 0
SUM OF ALL RESPONSES TO PHQ QUESTIONS 1-9: 0
1. LITTLE INTEREST OR PLEASURE IN DOING THINGS: 0
SUM OF ALL RESPONSES TO PHQ9 QUESTIONS 1 & 2: 0
2. FEELING DOWN, DEPRESSED OR HOPELESS: 0
SUM OF ALL RESPONSES TO PHQ QUESTIONS 1-9: 0
SUM OF ALL RESPONSES TO PHQ QUESTIONS 1-9: 0

## 2023-09-20 ASSESSMENT — ENCOUNTER SYMPTOMS
APNEA: 0
SHORTNESS OF BREATH: 0

## 2023-09-21 LAB
BASOPHILS # BLD AUTO: 0.1 X10E3/UL (ref 0–0.2)
BASOPHILS NFR BLD AUTO: 1 %
BUN SERPL-MCNC: 9 MG/DL (ref 6–20)
BUN/CREAT SERPL: 10 (ref 9–23)
CALCIUM SERPL-MCNC: 9.3 MG/DL (ref 8.7–10.2)
CHLORIDE SERPL-SCNC: 104 MMOL/L (ref 96–106)
CO2 SERPL-SCNC: 24 MMOL/L (ref 20–29)
CREAT SERPL-MCNC: 0.86 MG/DL (ref 0.57–1)
EGFRCR SERPLBLD CKD-EPI 2021: 90 ML/MIN/1.73
EOSINOPHIL # BLD AUTO: 0.1 X10E3/UL (ref 0–0.4)
EOSINOPHIL NFR BLD AUTO: 1 %
ERYTHROCYTE [DISTWIDTH] IN BLOOD BY AUTOMATED COUNT: 12.8 % (ref 11.7–15.4)
GLUCOSE SERPL-MCNC: 90 MG/DL (ref 70–99)
HBA1C MFR BLD: 6 % (ref 4.8–5.6)
HCT VFR BLD AUTO: 44.3 % (ref 34–46.6)
HGB BLD-MCNC: 14.2 G/DL (ref 11.1–15.9)
IMM GRANULOCYTES # BLD AUTO: 0 X10E3/UL (ref 0–0.1)
IMM GRANULOCYTES NFR BLD AUTO: 0 %
LYMPHOCYTES # BLD AUTO: 1.9 X10E3/UL (ref 0.7–3.1)
LYMPHOCYTES NFR BLD AUTO: 36 %
MCH RBC QN AUTO: 27.6 PG (ref 26.6–33)
MCHC RBC AUTO-ENTMCNC: 32.1 G/DL (ref 31.5–35.7)
MCV RBC AUTO: 86 FL (ref 79–97)
MONOCYTES # BLD AUTO: 0.6 X10E3/UL (ref 0.1–0.9)
MONOCYTES NFR BLD AUTO: 12 %
NEUTROPHILS # BLD AUTO: 2.5 X10E3/UL (ref 1.4–7)
NEUTROPHILS NFR BLD AUTO: 50 %
PLATELET # BLD AUTO: 422 X10E3/UL (ref 150–450)
POTASSIUM SERPL-SCNC: 4.3 MMOL/L (ref 3.5–5.2)
RBC # BLD AUTO: 5.14 X10E6/UL (ref 3.77–5.28)
SODIUM SERPL-SCNC: 143 MMOL/L (ref 134–144)
SPECIMEN STATUS REPORT: NORMAL
WBC # BLD AUTO: 5.1 X10E3/UL (ref 3.4–10.8)

## 2023-09-22 LAB — VZV IGG SER IA-ACNC: 1678 INDEX

## 2023-11-10 ENCOUNTER — OFFICE VISIT (OUTPATIENT)
Age: 33
End: 2023-11-10
Payer: OTHER GOVERNMENT

## 2023-11-10 VITALS
HEART RATE: 82 BPM | DIASTOLIC BLOOD PRESSURE: 82 MMHG | RESPIRATION RATE: 16 BRPM | HEIGHT: 63 IN | SYSTOLIC BLOOD PRESSURE: 142 MMHG | WEIGHT: 172 LBS | OXYGEN SATURATION: 97 % | BODY MASS INDEX: 30.48 KG/M2

## 2023-11-10 DIAGNOSIS — G43.009 MIGRAINE WITHOUT AURA AND WITHOUT STATUS MIGRAINOSUS, NOT INTRACTABLE: Primary | ICD-10-CM

## 2023-11-10 PROCEDURE — 99204 OFFICE O/P NEW MOD 45 MIN: CPT | Performed by: PSYCHIATRY & NEUROLOGY

## 2023-11-10 RX ORDER — RIZATRIPTAN BENZOATE 10 MG/1
TABLET, ORALLY DISINTEGRATING ORAL
Qty: 9 TABLET | Refills: 3 | Status: SHIPPED | OUTPATIENT
Start: 2023-11-10

## 2023-11-10 RX ORDER — ETONOGESTREL 68 MG/1
68 IMPLANT SUBCUTANEOUS ONCE
COMMUNITY

## 2023-11-10 ASSESSMENT — PATIENT HEALTH QUESTIONNAIRE - PHQ9
2. FEELING DOWN, DEPRESSED OR HOPELESS: 0
SUM OF ALL RESPONSES TO PHQ9 QUESTIONS 1 & 2: 0
1. LITTLE INTEREST OR PLEASURE IN DOING THINGS: 0
SUM OF ALL RESPONSES TO PHQ QUESTIONS 1-9: 0

## 2023-11-10 NOTE — PATIENT INSTRUCTIONS
manner, we are asking our patients to assist us by calling your Pharmacy for all prescription refills, this will include also your  Mail Order Pharmacy. The pharmacy will contact our office electronically to continue the refill process. Please do not wait until the last minute to call your pharmacy. We need at least 48 hours (2days) to fill prescriptions. We also encourage you to call your pharmacy before going to  your prescription to make sure it is ready. With regard to controlled substance prescription refill requests (narcotic refills) that need to be picked up at our office, we ask your cooperation by providing us with at least 72 hours (3days) notice that you will need a refill. We will not refill narcotic prescription refill requests after 4:00pm on any weekday, Monday through Thursday, or after 2:00pm on Fridays, or on the weekends. We encourage everyone to explore another way of getting your prescription refill request processed using [facility], our patient web portal through our electronic medical record system. [facility] is an efficient and effective way to communicate your medication request directly to the office and  downloadable as an alen on your smart phone . [facility] also features a review functionality that allows you to view your medication list as well as leave messages for your physician. Are you ready to get connected? If so please review the attatched instructions or speak to any of our staff to get you set up right away! Thank you so much for your cooperation. Should you have any questions please contact our Practice Administrator.

## 2023-11-10 NOTE — PROGRESS NOTES
ongoing headache. Her examination found her to be hypertensive and a bit tachycardic. She appeared to be in acute distress. She was nonfocal.  She was treated with IV fluid, Benadryl, Compazine, magnesium and Toradol. On reexamination she was normal tensive.   She was discharged from the ER    CT of the head from that date dry personally reviewed and unremarkable    Laboratory analysis from that date  Comprehensive metabolic profile unremarkable  CBC unremarkable  Magnesium unremarkable  hCG negative      Past Medical History:   Diagnosis Date    Allergic rhinitis     Concussion 2022    Headache(784.0)     History of multiple allergies     IBS (irritable bowel syndrome)     Migraine     Reflux gastritis     Sinus problem        Past Surgical History:   Procedure Laterality Date    COSMETIC SURGERY  2023    liposuction with fat transfer to buttocks    WISDOM TOOTH EXTRACTION  08/14/2015       Current Outpatient Medications   Medication Sig Dispense Refill    etonogestrel (NEXPLANON) 68 MG implant 68 mg by Subdermal route once      dicyclomine (BENTYL) 10 MG capsule Take 1 capsule by mouth 3 times daily as needed (IBS) 120 capsule 1    montelukast (SINGULAIR) 10 MG tablet TAKE ONE TABLET BY MOUTH DAILY 90 tablet 3    famotidine (PEPCID) 40 MG tablet TAKE ONE TABLET BY MOUTH ONCE NIGHTLY 90 tablet 3    EPINEPHrine (EPIPEN) 0.3 MG/0.3ML SOAJ injection INJECT IN THE MUSCLE AS DIRECTED AS NEEDED 2 each 0    aluminum hydroxide-magnesium carbonate (GENATON)  MG/15ML suspension Take 15 mLs by mouth as needed      butalbital-APAP-caffeine -40 MG CAPS per capsule Take 1 capsule by mouth every 4 hours as needed      cetirizine (ZYRTEC) 10 MG tablet Take 1 tablet by mouth nightly      vitamin D (CHOLECALCIFEROL) 25 MCG (1000 UT) TABS tablet Take 1 tablet by mouth daily      fluticasone (FLONASE) 50 MCG/ACT nasal spray SPRAY TWO SPRAYS IN EACH NOSTRIL ONCE DAILY      hydrOXYzine HCl (ATARAX) 25 MG tablet Take 1

## 2023-11-16 ENCOUNTER — OFFICE VISIT (OUTPATIENT)
Age: 33
End: 2023-11-16
Payer: OTHER GOVERNMENT

## 2023-11-16 VITALS
HEIGHT: 63 IN | RESPIRATION RATE: 16 BRPM | WEIGHT: 174 LBS | OXYGEN SATURATION: 98 % | TEMPERATURE: 98.3 F | BODY MASS INDEX: 30.83 KG/M2

## 2023-11-16 DIAGNOSIS — R09.82 POSTNASAL DRIP: ICD-10-CM

## 2023-11-16 DIAGNOSIS — N63.23 MASS OF LOWER OUTER QUADRANT OF LEFT BREAST: Primary | ICD-10-CM

## 2023-11-16 DIAGNOSIS — J02.9 ACUTE VIRAL PHARYNGITIS: ICD-10-CM

## 2023-11-16 PROCEDURE — 99213 OFFICE O/P EST LOW 20 MIN: CPT | Performed by: STUDENT IN AN ORGANIZED HEALTH CARE EDUCATION/TRAINING PROGRAM

## 2023-11-16 ASSESSMENT — PATIENT HEALTH QUESTIONNAIRE - PHQ9
SUM OF ALL RESPONSES TO PHQ QUESTIONS 1-9: 0
SUM OF ALL RESPONSES TO PHQ QUESTIONS 1-9: 0
SUM OF ALL RESPONSES TO PHQ9 QUESTIONS 1 & 2: 0
1. LITTLE INTEREST OR PLEASURE IN DOING THINGS: 0
SUM OF ALL RESPONSES TO PHQ QUESTIONS 1-9: 0
SUM OF ALL RESPONSES TO PHQ QUESTIONS 1-9: 0
2. FEELING DOWN, DEPRESSED OR HOPELESS: 0

## 2023-11-22 ENCOUNTER — TELEPHONE (OUTPATIENT)
Age: 33
End: 2023-11-22

## 2023-11-22 DIAGNOSIS — N63.23 MASS OF LOWER OUTER QUADRANT OF LEFT BREAST: Primary | ICD-10-CM

## 2023-11-22 NOTE — TELEPHONE ENCOUNTER
Oskar Haseeb stated that pt is scheduled for her appt; however, due to the mass in her breast and pt being over 30, they will need an order for a Benny Dx mammo to go with the U/S order. Please upload prior to her appt on 12/01.     Thank you

## 2023-12-12 ENCOUNTER — OFFICE VISIT (OUTPATIENT)
Age: 33
End: 2023-12-12
Payer: OTHER GOVERNMENT

## 2023-12-12 ENCOUNTER — TELEPHONE (OUTPATIENT)
Age: 33
End: 2023-12-12

## 2023-12-12 VITALS
HEIGHT: 63 IN | BODY MASS INDEX: 30.16 KG/M2 | OXYGEN SATURATION: 98 % | WEIGHT: 170.19 LBS | HEART RATE: 98 BPM | RESPIRATION RATE: 18 BRPM | TEMPERATURE: 99.1 F

## 2023-12-12 DIAGNOSIS — R11.0 NAUSEA: ICD-10-CM

## 2023-12-12 DIAGNOSIS — R06.83 SNORING: ICD-10-CM

## 2023-12-12 DIAGNOSIS — M62.838 MUSCLE SPASM OF SHOULDER REGION: ICD-10-CM

## 2023-12-12 DIAGNOSIS — G44.229 CHRONIC TENSION-TYPE HEADACHE, NOT INTRACTABLE: ICD-10-CM

## 2023-12-12 DIAGNOSIS — G43.E09 CHRONIC MIGRAINE WITH AURA WITHOUT STATUS MIGRAINOSUS, NOT INTRACTABLE: Primary | ICD-10-CM

## 2023-12-12 LAB
ALBUMIN SERPL-MCNC: 4 G/DL (ref 3.5–5)
ALBUMIN/GLOB SERPL: 1.2 (ref 1.1–2.2)
ALP SERPL-CCNC: 74 U/L (ref 45–117)
ALT SERPL-CCNC: 31 U/L (ref 12–78)
ANION GAP SERPL CALC-SCNC: 3 MMOL/L (ref 5–15)
AST SERPL-CCNC: 13 U/L (ref 15–37)
BILIRUB SERPL-MCNC: 0.4 MG/DL (ref 0.2–1)
BUN SERPL-MCNC: 8 MG/DL (ref 6–20)
BUN/CREAT SERPL: 10 (ref 12–20)
CALCIUM SERPL-MCNC: 9.4 MG/DL (ref 8.5–10.1)
CHLORIDE SERPL-SCNC: 113 MMOL/L (ref 97–108)
CHOLEST SERPL-MCNC: 176 MG/DL
CO2 SERPL-SCNC: 29 MMOL/L (ref 21–32)
CREAT SERPL-MCNC: 0.82 MG/DL (ref 0.55–1.02)
ERYTHROCYTE [DISTWIDTH] IN BLOOD BY AUTOMATED COUNT: 12.9 % (ref 11.5–14.5)
GLOBULIN SER CALC-MCNC: 3.4 G/DL (ref 2–4)
GLUCOSE SERPL-MCNC: 107 MG/DL (ref 65–100)
HCT VFR BLD AUTO: 44.5 % (ref 35–47)
HDLC SERPL-MCNC: 44 MG/DL
HDLC SERPL: 4 (ref 0–5)
HGB BLD-MCNC: 14.8 G/DL (ref 11.5–16)
LDLC SERPL CALC-MCNC: 124.2 MG/DL (ref 0–100)
MCH RBC QN AUTO: 28 PG (ref 26–34)
MCHC RBC AUTO-ENTMCNC: 33.3 G/DL (ref 30–36.5)
MCV RBC AUTO: 84.1 FL (ref 80–99)
NRBC # BLD: 0 K/UL (ref 0–0.01)
NRBC BLD-RTO: 0 PER 100 WBC
PLATELET # BLD AUTO: 411 K/UL (ref 150–400)
PMV BLD AUTO: 9.9 FL (ref 8.9–12.9)
POTASSIUM SERPL-SCNC: 4.1 MMOL/L (ref 3.5–5.1)
PROT SERPL-MCNC: 7.4 G/DL (ref 6.4–8.2)
RBC # BLD AUTO: 5.29 M/UL (ref 3.8–5.2)
SODIUM SERPL-SCNC: 145 MMOL/L (ref 136–145)
TRIGL SERPL-MCNC: 39 MG/DL
TSH SERPL DL<=0.05 MIU/L-ACNC: 0.47 UIU/ML (ref 0.36–3.74)
VLDLC SERPL CALC-MCNC: 7.8 MG/DL
WBC # BLD AUTO: 4.9 K/UL (ref 3.6–11)

## 2023-12-12 PROCEDURE — 99214 OFFICE O/P EST MOD 30 MIN: CPT | Performed by: STUDENT IN AN ORGANIZED HEALTH CARE EDUCATION/TRAINING PROGRAM

## 2023-12-12 RX ORDER — ONDANSETRON 4 MG/1
4 TABLET, ORALLY DISINTEGRATING ORAL EVERY 8 HOURS PRN
Qty: 20 TABLET | Refills: 0 | Status: SHIPPED | OUTPATIENT
Start: 2023-12-12

## 2023-12-12 ASSESSMENT — PATIENT HEALTH QUESTIONNAIRE - PHQ9
SUM OF ALL RESPONSES TO PHQ QUESTIONS 1-9: 2
2. FEELING DOWN, DEPRESSED OR HOPELESS: 1
SUM OF ALL RESPONSES TO PHQ QUESTIONS 1-9: 2
SUM OF ALL RESPONSES TO PHQ9 QUESTIONS 1 & 2: 2
SUM OF ALL RESPONSES TO PHQ QUESTIONS 1-9: 2
SUM OF ALL RESPONSES TO PHQ QUESTIONS 1-9: 2
1. LITTLE INTEREST OR PLEASURE IN DOING THINGS: 1

## 2023-12-12 NOTE — PATIENT INSTRUCTIONS
Please let me know via [employer] what medication to prevent migraines you would want to start (if you choose Propanolol, please send me blood pressures and heart rates as well)  Sent Zofran to pharmacy  Referral sent to sleep center for eval of sleep apnea

## 2023-12-22 ENCOUNTER — HOSPITAL ENCOUNTER (OUTPATIENT)
Facility: HOSPITAL | Age: 33
Discharge: HOME | End: 2023-12-25
Payer: OTHER GOVERNMENT

## 2023-12-22 ENCOUNTER — HOSPITAL ENCOUNTER (OUTPATIENT)
Facility: HOSPITAL | Age: 33
Setting detail: RECURRING SERIES
Discharge: HOME | End: 2023-12-25
Payer: OTHER GOVERNMENT

## 2023-12-22 ENCOUNTER — HOSPITAL ENCOUNTER (OUTPATIENT)
Facility: HOSPITAL | Age: 33
Discharge: HOME | End: 2023-12-25
Attending: STUDENT IN AN ORGANIZED HEALTH CARE EDUCATION/TRAINING PROGRAM
Payer: OTHER GOVERNMENT

## 2023-12-22 DIAGNOSIS — N63.23 MASS OF LOWER OUTER QUADRANT OF LEFT BREAST: ICD-10-CM

## 2023-12-22 PROCEDURE — 97112 NEUROMUSCULAR REEDUCATION: CPT | Performed by: PHYSICAL THERAPIST

## 2023-12-22 PROCEDURE — G0279 TOMOSYNTHESIS, MAMMO: HCPCS

## 2023-12-22 PROCEDURE — 97161 PT EVAL LOW COMPLEX 20 MIN: CPT | Performed by: PHYSICAL THERAPIST

## 2023-12-22 PROCEDURE — 76642 ULTRASOUND BREAST LIMITED: CPT

## 2023-12-22 NOTE — THERAPY EVALUATION
(R) no   no  Horizontal Abduction (R)    50 degrees  70 degrees    CERVICAL-CRANIO-MANDIBULAR  Cervical Axial Rotation    WFL degrees  WFL P! degrees  Cervical Side Bend     wn   wfl  Cervical Extension     Positive      Objective/Functional Outcome Measure: neck  FOTO Score: 57  FOTO score = an established functional score where 100 = no disability      40 min [x]Eval - untimed                        Therapeutic Procedures: Tx Min Billable or 1:1 Min (if diff from Tx Min) Procedure, Rationale, Specifics   10  [id number] Neuromuscular Re-Education (timed):  improve balance, coordination, kinesthetic sense, posture, core stability and proprioception to improve patient's ability to develop conscious control of individual muscles and awareness of position of extremities in order to progress to PLOF and address remaining functional goals. (see flow sheet as applicable)    Details if applicable:     10     Total Total       Modalities Rationale:     decrease inflammation, decrease pain, and increase tissue extensibility to improve patient's ability to progress to PLOF and address remaining functional goals. min [] Estim Unattended,             type/location:       []  w/ice    []  w/heat          min [] Estim Attended,             type/location:       []  w/ice   []  w/heat         []  w/US   []  TENS insruct                min []  Mechanical Traction,        type/lbs:        []  pro      []  sup           []  int       []  cont            []  before manual           []  after manual     min []  Ultrasound,         settings/location:     10 min  unbilled [x]  Ice     [x]  Heat            location/position: Supine, head and neck            min []  Vasopneumatic Device,      press/temp:   pre-treatment girth :    post-treatment girth :    measured at (landmark       location) :    If using vaso (only need to measure limb vaso being performed on)        min []  Other:        Skin assessment post-treatment (if applicable):

## 2024-01-10 ENCOUNTER — HOSPITAL ENCOUNTER (OUTPATIENT)
Facility: HOSPITAL | Age: 34
Setting detail: RECURRING SERIES
Discharge: HOME | End: 2024-01-13
Payer: OTHER GOVERNMENT

## 2024-01-10 ENCOUNTER — APPOINTMENT (OUTPATIENT)
Facility: HOSPITAL | Age: 34
End: 2024-01-10
Payer: OTHER GOVERNMENT

## 2024-01-10 PROCEDURE — 97112 NEUROMUSCULAR REEDUCATION: CPT | Performed by: PHYSICAL THERAPIST

## 2024-01-10 PROCEDURE — 97140 MANUAL THERAPY 1/> REGIONS: CPT | Performed by: PHYSICAL THERAPIST

## 2024-01-10 NOTE — PROGRESS NOTES
PHYSICAL THERAPY - MEDICARE DAILY TREATMENT NOTE (updated 3/23)      Date: 1/10/2024          Patient Name:  Stephanie White :  1990   Medical   Diagnosis:  Chronic tension-type headache, not intractable [G44.229]  Muscle spasm of shoulder region [M62.838] Treatment Diagnosis:  M54.2  NECK PAIN    Referral Source:  Nalini Barragan MD Insurance:   Payor: [facility] / Plan: [facility] / Product Type: *No Product type* /                     Patient  verified yes     Visit #   Current  / Total 2 12   Time   In / Out 340  PM   Total Treatment Time 45   Total Timed Codes 45   1:1 Treatment Time 45      [facility] Totals Reminder:  bill using total billable   min of TIMED therapeutic procedures and modalities.   8-22 min = 1 unit; 23-37 min = 2 units; 38-52 min = 3 units; 53-67 min = 4 units; 68-82 min = 5 units            SUBJECTIVE    Pain Level (0-10 scale): 3    Any medication changes, allergies to medications, adverse drug reactions, diagnosis change, or new procedure performed?: [x] No    [] Yes (see summary sheet for update)  Medications: Verified on Patient Summary List    Subjective functional status/changes:     Pt reports she had a busy holiday season \"I am working on journaling and finding a schedule\" \"I am not getting enough sleep and I am trying to find a way to get my sleep back on track\" \"last night I woke up in the middle of the night vomiting\"  Pt reports she has been under a lot of personal stress and had a bad HA yesterday    OBJECTIVE      Therapeutic Procedures:  Tx Min Billable or 1:1 Min (if diff from Tx Min) Procedure, Rationale, Specifics   30  [id number] Neuromuscular Re-Education (timed):  improve balance, coordination, kinesthetic sense, posture, core stability and proprioception to improve patient's ability to develop conscious control of individual muscles and awareness of position of extremities in order to progress to PLOF and address remaining functional goals. (see flow sheet as

## 2024-01-13 ASSESSMENT — SLEEP AND FATIGUE QUESTIONNAIRES
HOW LIKELY ARE YOU TO NOD OFF OR FALL ASLEEP WHEN YOU ARE A PASSENGER IN A CAR FOR AN HOUR WITHOUT A BREAK: 1
DO YOU HAVE DIFFICULTY OPERATING A MOTOR VEHICLE FOR LONG DISTANCES (GREATER THAN 100 MILES) BECAUSE YOU BECOME SLEEPY: NO
AVERAGE NUMBER OF SLEEP HOURS: 8
HOW LIKELY ARE YOU TO NOD OFF OR FALL ASLEEP WHILE SITTING AND TALKING TO SOMEONE: 0
AVERAGE NUMBER OF SLEEP HOURS: 8
ARE YOU BOTHERED BY WAKING UP TOO EARLY AND NOT BEING ABLE TO GET BACK TO SLEEP: IS NOT
HOW LIKELY ARE YOU TO NOD OFF OR FALL ASLEEP WHILE SITTING INACTIVE IN A PUBLIC PLACE: 0
NECK CIRCUMFERENCE (INCHES): 15
HOW LIKELY ARE YOU TO NOD OFF OR FALL ASLEEP WHILE WATCHING TV: WOULD NEVER DOZE
HOW LIKELY ARE YOU TO NOD OFF OR FALL ASLEEP WHILE SITTING AND TALKING TO SOMEONE: WOULD NEVER DOZE
DO YOU HAVE DIFFICULTY BEING AS ACTIVE AS YOU WANT TO BE IN THE MORNING BECAUSE YOU ARE SLEEPY OR TIRED: YES, LITTLE
HOW LIKELY ARE YOU TO NOD OFF OR FALL ASLEEP WHEN YOU ARE A PASSENGER IN A CAR FOR AN HOUR WITHOUT A BREAK: SLIGHT CHANCE OF DOZING
HOW LIKELY ARE YOU TO NOD OFF OR FALL ASLEEP WHILE SITTING QUIETLY AFTER LUNCH WITHOUT ALCOHOL: 0
HOW LIKELY ARE YOU TO NOD OFF OR FALL ASLEEP IN A CAR, WHILE STOPPED FOR A FEW MINUTES IN TRAFFIC: WOULD NEVER DOZE
DO YOU HAVE PROBLEMS WITH FREQUENT AWAKENINGS AT NIGHT: NO
ARE YOU BOTHERED BY WAKING UP TOO EARLY AND NOT BEING ABLE TO GET BACK TO SLEEP: NO
DO YOU HAVE DIFFICULTY BEING AS ACTIVE AS YOU WANT TO BE IN THE EVENING BECAUSE YOU ARE SLEEPY OR TIRED: NO
ESS TOTAL SCORE: 2
SELECT ANY OF THE FOLLOWING BEHAVIORS OBSERVED WHILE PATIENT ASLEEP: LIGHT SNORING;SLEEP TALKING
WHAT TIME DO YOU USUALLY GO TO BED: 22:00
DO YOU TAKE NAPS: NO
HOW LIKELY ARE YOU TO NOD OFF OR FALL ASLEEP WHILE WATCHING TV: 0
FOSQ SCORE: 18
DO YOU GET TOO LITTLE SLEEP AT NIGHT: DOES NOT
SELECT ANY OF THE FOLLOWING BEHAVIORS OBSERVED WHILE YOU ARE ASLEEP: LIGHT SNORING
SELECT ANY OF THE FOLLOWING BEHAVIORS OBSERVED WHILE YOU ARE ASLEEP: SLEEP TALKING
DO YOU HAVE DIFFICULTY CONCENTRATING ON THE THINGS YOU DO BECAUSE YOU ARE SLEEPY OR TIRED: YES, A LITTLE
DO YOU HAVE DIFFICULTY VISITING YOUR FAMILY OR FRIENDS IN THEIR HOME BECAUSE YOU BECOME SLEEPY OR TIRED: NO
HOW LIKELY ARE YOU TO NOD OFF OR FALL ASLEEP WHILE LYING DOWN TO REST IN THE AFTERNOON WHEN CIRCUMSTANCES PERMIT: SLIGHT CHANCE OF DOZING
DO YOU GET TOO LITTLE SLEEP AT NIGHT: NO
DO YOU WORK SHIFTS: NO
DO YOU HAVE DIFFICULTY OPERATING A MOTOR VEHICLE FOR SHORT DISTANCES (LESS THAN 100 MILES) BECAUSE YOU BECOME SLEEPY: NO
HOW LIKELY ARE YOU TO NOD OFF OR FALL ASLEEP WHILE SITTING INACTIVE IN A PUBLIC PLACE: WOULD NEVER DOZE
HOW LIKELY ARE YOU TO NOD OFF OR FALL ASLEEP WHILE SITTING AND READING: 0
HOW LIKELY ARE YOU TO NOD OFF OR FALL ASLEEP WHILE SITTING QUIETLY AFTER LUNCH WITHOUT ALCOHOL: WOULD NEVER DOZE
HAS YOUR MOOD BEEN AFFECTED BECAUSE YOU ARE SLEEPY OR TIRED: YES, LITTLE
DO YOU GENERALLY HAVE DIFFICULTY REMEMBERING THINGS BECAUSE YOU ARE SLEEPY OR TIRED: YES, A LITTLE
DO YOU HAVE DIFFICULTY WATCHING A MOVIE OR VIDEO BECAUSE YOU BECOME SLEEPY OR TIRED: NO
HOW LIKELY ARE YOU TO NOD OFF OR FALL ASLEEP IN A CAR, WHILE STOPPED FOR A FEW MINUTES IN TRAFFIC: 0
HAS YOUR RELATIONSHIP WITH FAMILY, FRIENDS OR WORK COLLEAGUES BEEN AFFECTED BECAUSE YOU ARE SLEEPY OR TIRED: NO
HOW LIKELY ARE YOU TO NOD OFF OR FALL ASLEEP WHILE LYING DOWN TO REST IN THE AFTERNOON WHEN CIRCUMSTANCES PERMIT: 1
NUMBER OF TIMES YOU WAKE PER NIGHT: 0
HOW LIKELY ARE YOU TO NOD OFF OR FALL ASLEEP WHILE SITTING AND READING: WOULD NEVER DOZE

## 2024-01-15 ENCOUNTER — OFFICE VISIT (OUTPATIENT)
Age: 34
End: 2024-01-15
Payer: OTHER GOVERNMENT

## 2024-01-15 VITALS
SYSTOLIC BLOOD PRESSURE: 140 MMHG | BODY MASS INDEX: 29.95 KG/M2 | HEIGHT: 63 IN | HEART RATE: 108 BPM | OXYGEN SATURATION: 99 % | DIASTOLIC BLOOD PRESSURE: 86 MMHG | WEIGHT: 169 LBS

## 2024-01-15 DIAGNOSIS — G47.33 OSA (OBSTRUCTIVE SLEEP APNEA): Primary | ICD-10-CM

## 2024-01-15 PROCEDURE — 99203 OFFICE O/P NEW LOW 30 MIN: CPT | Performed by: INTERNAL MEDICINE

## 2024-01-15 NOTE — PATIENT INSTRUCTIONS
[address]  [place] [address]  Tel.  [phone]  Fax. [phone] [address]  [place] [address]  Tel.  [phone]  Fax. [phone] [address] [place] [address]  Tel.  [phone]  Fax. [phone]     Sleep Apnea: After Your Visit  Your Care Instructions  Sleep apnea occurs when you frequently stop breathing for 10 seconds or longer during sleep. It can be mild to severe, based on the number of times per hour that you stop breathing or have slowed breathing. Blocked or narrowed airways in your nose, mouth, or throat can cause sleep apnea. Your airway can become blocked when your throat muscles and tongue relax during sleep.  Sleep apnea is common, occurring in 1 out of 20 individuals.  Individuals having any of the following characteristics should be evaluated and treated right away due to high risk and detrimental consequences from untreated sleep apnea:  Obesity  Congestive Heart failure  Atrial Fibrillation  Uncontrolled Hypertension  Type II Diabetes  Night-time Arrhythmias  Stroke  Pulmonary Hypertension  High-risk Driving Populations (pilots, truck drivers, etc.)  Patients Considering Weight-loss Surgery    How do you know you have sleep apnea?  You probably have sleep apnea if you answer 'yes' to 3 or more of the following questions:  S - Have you been told that you Snore?   T - Are you often Tired during the day?  O - Has anyone Observed you stop breathing while sleeping?  P- Do you have (or are being treated for) high blood Pressure?    B - Are you obese (Body Mass Index > 35)?  A - Is your Age 50 years old or older?  N - Is your Neck size greater than 16 inches?  G - Are you male Gender?  A sleep physician can prescribe a breathing device that prevents tissues in the throat from blocking your airway. Or your doctor may recommend using a dental device (oral breathing device) to help keep your airway open. In some cases, surgery may

## 2024-01-15 NOTE — PROGRESS NOTES
[address] [address]  Tel.  [phone]    Fax. [phone]     [address] [address]  Tel.  [phone]    Fax. [phone] [address] [place] [address]  Tel.  [phone]    Fax. [phone]       Stephanie White is a 33 y.o. year old female referred by Nalini Barragan MD  for evaluation of a sleep disorder.       ASSESSMENT/PLAN:     Diagnosis Orders   1. CADENCE (obstructive sleep apnea)  SLEEP STUDY UNATTENDED, 4 CHANNEL      2. BMI 29.0-29.9,adult            Patient has a history and examination consistent with the diagnosis of sleep apnea.    Return for follow-up after testing is completed.    * The patient currently has a Minimal risk for having sleep apnea.  STOP-BANG score 2.    * Sleep testing was ordered for initial evaluation.      Orders Placed This Encounter   Procedures    SLEEP STUDY UNATTENDED, 4 CHANNEL     Standing Status:   Future     Standing Expiration Date:   1/15/2025       * She was provided information on sleep apnea including corresponding risk factors and the importance of proper treatment.     * Treatment options were reviewed in detail, she would like to proceed with Oral Appliance Therapy. Patient understands that CPAP therapy is the gold standard for treatment of CADENCE.    * The patient was counseled regarding proper sleep hygiene, with emphasis on ensuring sufficient total sleep time; safe driving and the benefits of exercise and weight loss.      * All of her questions were addressed.    2. Recommended a dedicated weight loss program through appropriate diet and exercise regimen as significant weight reduction has been shown to reduce severity of obstructive sleep apnea.     SUBJECTIVE/OBJECTIVE:    Stephanie White is an 33 y.o. female referred for evaluation for a sleep disorder. She complains of snoring associated with

## 2024-01-16 ENCOUNTER — APPOINTMENT (OUTPATIENT)
Facility: HOSPITAL | Age: 34
End: 2024-01-16
Payer: OTHER GOVERNMENT

## 2024-01-16 ENCOUNTER — TELEMEDICINE (OUTPATIENT)
Age: 34
End: 2024-01-16
Payer: OTHER GOVERNMENT

## 2024-01-16 ENCOUNTER — TELEPHONE (OUTPATIENT)
Age: 34
End: 2024-01-16

## 2024-01-16 DIAGNOSIS — F43.23 ADJUSTMENT DISORDER WITH MIXED ANXIETY AND DEPRESSED MOOD: Primary | ICD-10-CM

## 2024-01-16 PROCEDURE — 99213 OFFICE O/P EST LOW 20 MIN: CPT

## 2024-01-16 NOTE — PROGRESS NOTES
[address] [id number]   Office [phone], Fax [phone]     Stephanie White is a 33 y.o. female who [place] [facility] [place] Referred by Dr. Rdz.     -Mental health history: Chronic tension-type headache, not intractable  Muscle spasm of shoulder region      -HPI: Feels like work stress is affecting health. Works as an N[employer] at the [employer]. Exam room and office is in the same room, admin time is during 1 hour break period, having to see pt that check in late which frustrates pt. Has worked with therapist in the past. Recently moved into a bigger home since being  to second  as previous home was too small for 4 kids. Moved into new home on last month.     -Stressors include: Work and home.     -Strategies pt has tried in an attempt to control stress: Prays, removes self from difficult situations, walks. Partakes in journaling at times. Loves jazz music and listens to music during work breaks. Has people to talk to   (Parrish phillips,Nora)    -Medications for anxiety and depression: None    Current Outpatient Medications   Medication Sig Dispense Refill    ondansetron (ZOFRAN-ODT) 4 MG disintegrating tablet Take 1 tablet by mouth every 8 hours as needed for Nausea or Vomiting 20 tablet 0    etonogestrel (NEXPLANON) 68 MG implant 68 mg by Subdermal route once      rizatriptan (MAXALT-MLT) 10 MG disintegrating tablet 1 at migraine onset; may repeat in 2 hours if needed 9 tablet 3    dicyclomine (BENTYL) 10 MG capsule Take 1 capsule by mouth 3 times daily as needed (IBS) 120 capsule 1    montelukast (SINGULAIR) 10 MG tablet TAKE ONE TABLET BY MOUTH DAILY 90 tablet 3    famotidine (PEPCID) 40 MG tablet TAKE ONE TABLET BY MOUTH ONCE NIGHTLY 90 tablet 3    EPINEPHrine (EPIPEN) 0.3 MG/0.3ML SOAJ injection INJECT IN THE MUSCLE AS DIRECTED AS NEEDED 2 each 0    aluminum hydroxide-magnesium carbonate (GENATON)  MG/15ML suspension Take 15 mLs by mouth as needed

## 2024-01-16 NOTE — TELEPHONE ENCOUNTER
----- Message from Kim Jeffries sent at 1/16/2024  2:46 PM EST -----  Subject: Message to Provider    QUESTIONS  Information for Provider? Patient is calling in to schedule an appoint   with Dr. Jackson at [facility] stress [facility] psychologist. Tried to WT to provider   no answer. Please advise  ---------------------------------------------------------------------------  --------------  CALL BACK INFO  [phone]; OK to leave message on voicemail  ---------------------------------------------------------------------------  --------------  SCRIPT ANSWERS  Relationship to Patient? Self

## 2024-02-15 ENCOUNTER — PATIENT MESSAGE (OUTPATIENT)
Age: 34
End: 2024-02-15

## 2024-02-15 DIAGNOSIS — F40.243 ANXIETY WITH FLYING: Primary | ICD-10-CM

## 2024-02-16 ENCOUNTER — OFFICE VISIT (OUTPATIENT)
Age: 34
End: 2024-02-16
Payer: OTHER GOVERNMENT

## 2024-02-16 VITALS
DIASTOLIC BLOOD PRESSURE: 82 MMHG | WEIGHT: 168 LBS | HEART RATE: 122 BPM | HEIGHT: 63 IN | BODY MASS INDEX: 29.77 KG/M2 | OXYGEN SATURATION: 98 % | RESPIRATION RATE: 16 BRPM | SYSTOLIC BLOOD PRESSURE: 134 MMHG

## 2024-02-16 DIAGNOSIS — K21.9 GASTRO-ESOPHAGEAL REFLUX DISEASE WITHOUT ESOPHAGITIS: ICD-10-CM

## 2024-02-16 DIAGNOSIS — R06.83 SNORING: ICD-10-CM

## 2024-02-16 DIAGNOSIS — J35.01 CHRONIC TONSILLITIS: Primary | ICD-10-CM

## 2024-02-16 DIAGNOSIS — R06.6 HICCUPS: ICD-10-CM

## 2024-02-16 PROCEDURE — 99213 OFFICE O/P EST LOW 20 MIN: CPT | Performed by: OTOLARYNGOLOGY

## 2024-02-16 RX ORDER — OMEPRAZOLE 40 MG/1
40 CAPSULE, DELAYED RELEASE ORAL
Qty: 90 CAPSULE | Refills: 1 | Status: SHIPPED | OUTPATIENT
Start: 2024-02-16

## 2024-02-16 RX ORDER — DIAZEPAM 2 MG/1
2 TABLET ORAL 2 TIMES DAILY PRN
Qty: 4 TABLET | Refills: 0 | Status: SHIPPED | OUTPATIENT
Start: 2024-02-16 | End: 2024-02-20

## 2024-02-16 NOTE — PROGRESS NOTES
Otolaryngology-Head and Neck Surgery  Follow Up Patient Visit     Patient: Stephanie White  YOB: 1990  MRN: [id number]  Date of Service: 2/16/2024      Chief Complaint:   Chief Complaint   Patient presents with    Follow-up     Reflux and swallowing      Interval hx 2/16/2024  Had been doing better but has developed hiccups with pain associated with them  Has also noted reflux episodes which are substantial at night, with nasal reflux    Interval hx 7/28/2023  Seen by GI, reflux / bowel issues are better    Astelin nasal spray is helpful for allergies but taste of medication hard to tolerate     Prior allergy shots - did not tolerate well   Few years ago   Did for few months only due to issues with reaction at injection site     Also continues to have tonsil stones  Water pik too harsh for her throat     Interval hx 4/21/2023  Throat pain better   Heartburn better    Does cont to feel trouble with drinking water, feels like it goes down too fast    Also notes history of snoring - has had prior PSG which was negative    Also has history of tonsil stones     History of Present Illness: Stephanie White is a 32 y.o. year old female who presents today for discussion of her throat    Describes chronic heartburn ongoing for years    Lately is worse    Was previously able to manage / control symptoms with diet changes    Takes H2 blocker daily for allergy related symptoms    Has noticed stress is worse and wonders if this is related    Has episodes of swallowing concerns  Solid food ok, but with liquid occ feels like goes down too quickly or down the wrong pipe    Past Medical History:  Past Medical History:   Diagnosis Date    Headache(784.0)     History of multiple allergies     IBS (irritable bowel syndrome)     Reflux gastritis     Sinus problem        Past Surgical History:   Past Surgical History:   Procedure Laterality Date    HX WISDOM TEETH EXTRACTION  08/14/2015       Medications:   Current

## 2024-02-16 NOTE — TELEPHONE ENCOUNTER
From: Stephanie White  To: Dr. Sasha Rdz  Sent: 2/15/2024 12:01 PM EST  Subject: Medication     Good Morning Dr. Rdz,    I apologize that I didn’t message you sooner for this request, but I’m flying out to [facility] to [place] this Saturday and I need my diazepam refilled for my flight anxiety. Can you please refill this for 4 tabs so I have an extra dose just in case I need it? I would greatly appreciate this.    Hope you had a great Meadows’s Day and hope you have an amazing weekend,    Stephanie

## 2024-02-20 ENCOUNTER — PATIENT MESSAGE (OUTPATIENT)
Age: 34
End: 2024-02-20

## 2024-02-20 ENCOUNTER — TELEMEDICINE (OUTPATIENT)
Age: 34
End: 2024-02-20
Payer: OTHER GOVERNMENT

## 2024-02-20 DIAGNOSIS — F43.23 ADJUSTMENT DISORDER WITH MIXED ANXIETY AND DEPRESSED MOOD: Primary | ICD-10-CM

## 2024-02-20 PROCEDURE — 99213 OFFICE O/P EST LOW 20 MIN: CPT

## 2024-02-20 NOTE — PROGRESS NOTES
[address] [place] [address]   Office [phone], Fax [phone]    Stephanie White was seen by synchronous (real-time) audio only technology, and/or his/her healthcare decision maker, is aware that this patient-initiated, Telehealth encounter on 2/20/2024  is a billable service, with coverage as determined by his/her insurance carrier. He/she is aware that he/she may receive a bill and has provided verbal consent to proceed: YES.    Stephanie White is a 33 y.o. female who [place] [facility] [place] Referred by Dr. Rdz.     -Mental health history: anxiety and depression      -HPI: Feels mood is overall improved. Has joined a committee at work which allows for good team interaction. Has greatly improved mood. Recently moved into a new house and is still unpacking. Children have complex school schedules with sports and it is difficult to coordinate /drop off with work. Daughter recently seen in ED for SI without plan.     -Stressors include: work, feels frustrated when patients repeatedly show up late. Balancing home/family stress with new house and coordinating complex schedules with kids.    -Strategies pt has tried in an attempt to control stress: stretching, deep breathing, night time relaxation routine.     -Medications for anxiety and depression: None- on diazepam 2mg BID PRN    Current Outpatient Medications   Medication Sig Dispense Refill    diazePAM (VALIUM) 2 MG tablet Take 1 tablet by mouth 2 times daily as needed for Anxiety (Anxiety with flying) for up to 4 days. Max Daily Amount: 4 mg 4 tablet 0    omeprazole (PRILOSEC) 40 MG delayed release capsule Take 1 capsule by mouth every morning (before breakfast) 90 capsule 1    ondansetron (ZOFRAN-ODT) 4 MG disintegrating tablet Take 1 tablet by mouth every 8 hours as needed for Nausea or Vomiting 20 tablet 0    etonogestrel (NEXPLANON) 68 MG implant 68 mg by Subdermal route once      rizatriptan (MAXALT-MLT) 10

## 2024-02-21 NOTE — PROGRESS NOTES
I reviewed with the resident the medical history and the resident's findings on the physical examination.  I discussed with the resident the patient's diagnosis and concur with the plan.     Sasha Rdz MD 2/21/2024

## 2024-02-22 ENCOUNTER — PROCEDURE VISIT (OUTPATIENT)
Age: 34
End: 2024-02-22

## 2024-02-22 ENCOUNTER — HOSPITAL ENCOUNTER (OUTPATIENT)
Facility: HOSPITAL | Age: 34
Discharge: HOME | End: 2024-02-25

## 2024-02-22 DIAGNOSIS — G47.33 OSA (OBSTRUCTIVE SLEEP APNEA): Primary | ICD-10-CM

## 2024-02-22 NOTE — PROGRESS NOTES
[address]  [place] [address]  Tel.  [phone]  Fax. [phone] [address]  [place] [address]  Tel.  [phone]  Fax. [phone] [address] [place] [address]  Tel.  [phone]  Fax. [phone]       S>Stephanie White is a 33 y.o. female seen today to receive a home sleep testing unit (HST).    Patient was educated on proper hookup and operation of the HST.  Instruction forms and documentation were reviewed and signed.  The patient demonstrated good understanding of the HST.    O>    There were no vitals taken for this visit.      A>  No diagnosis found.      P>  General information regarding operations and maintenance of the device was provided.  She was provided information on sleep apnea including coresponding risk factors and the importance of proper treatment.   Follow-up appointment was made to return the HST. She will be contacted once the results have been reviewed.  She was asked to contact our office for any problems regarding her home sleep test study.

## 2024-02-27 ENCOUNTER — TELEPHONE (OUTPATIENT)
Age: 34
End: 2024-02-27

## 2024-02-27 DIAGNOSIS — G47.33 OSA (OBSTRUCTIVE SLEEP APNEA): Primary | ICD-10-CM

## 2024-02-29 ENCOUNTER — TELEPHONE (OUTPATIENT)
Age: 34
End: 2024-02-29

## 2024-02-29 NOTE — TELEPHONE ENCOUNTER
Stephanie White is to be contacted by sleep technologists regarding results of WatchPAT Testing which was indicative of an average pAHI 3% of 5.4 and pAHI 4% of 1.8 per hour.  SpO2 virgie was 86% and SpO2 of < 88% was 0.1 minutes.      * Treatment options were offered at initial visit.  She had elected to proceed with a trial of using an Oral Device (Mandibular Advancing Device, Tongue Retention Device, etc.) which has been shown to be effective treatment for obstructive sleep apnea.  * We have referred the patient to Dentistry for oral appliance evaluation.    The respiratory disturbance noted above occurred predominantly in supine position. Patient would therefore benefit from sleeping on the side using extra pillows or one of the commercially available sleep positioning devices.    Follow-up office visit and re-testing to be done in 3-4 months after initiation of oral appliance therapy to assess efficacy of therapy.       Encounter Diagnosis   Name Primary?    CADENCE (obstructive sleep apnea) Yes       Orders Placed This Encounter   Procedures    Amb External Referral To Dentistry     Referral Priority:   Routine     Referral Type:   Consult for Advice and Opinion     Referral Reason:   Specialty Services Required     Referred to Provider:   Ester Demarco DDS     Requested Specialty:   Dentistry     Number of Visits Requested:   1

## 2024-03-01 ENCOUNTER — CLINICAL DOCUMENTATION (OUTPATIENT)
Age: 34
End: 2024-03-01

## 2024-03-14 ENCOUNTER — PREP FOR PROCEDURE (OUTPATIENT)
Age: 34
End: 2024-03-14

## 2024-03-14 DIAGNOSIS — R06.6 HICCUPS: ICD-10-CM

## 2024-03-14 DIAGNOSIS — J35.01 CHRONIC TONSILLITIS: ICD-10-CM

## 2024-03-14 DIAGNOSIS — R06.83 SNORING: ICD-10-CM

## 2024-03-14 DIAGNOSIS — K21.9 GASTRO-ESOPHAGEAL REFLUX DISEASE WITHOUT ESOPHAGITIS: ICD-10-CM

## 2024-03-22 ENCOUNTER — OFFICE VISIT (OUTPATIENT)
Age: 34
End: 2024-03-22
Payer: OTHER GOVERNMENT

## 2024-03-22 VITALS
DIASTOLIC BLOOD PRESSURE: 82 MMHG | RESPIRATION RATE: 20 BRPM | HEART RATE: 75 BPM | SYSTOLIC BLOOD PRESSURE: 130 MMHG | OXYGEN SATURATION: 99 %

## 2024-03-22 DIAGNOSIS — G43.009 MIGRAINE WITHOUT AURA AND WITHOUT STATUS MIGRAINOSUS, NOT INTRACTABLE: Primary | ICD-10-CM

## 2024-03-22 PROCEDURE — 99214 OFFICE O/P EST MOD 30 MIN: CPT

## 2024-03-22 RX ORDER — BUTALBITAL, ACETAMINOPHEN AND CAFFEINE 300; 40; 50 MG/1; MG/1; MG/1
1 CAPSULE ORAL EVERY 6 HOURS PRN
Qty: 30 CAPSULE | Refills: 5 | Status: SHIPPED | OUTPATIENT
Start: 2024-03-22

## 2024-03-22 RX ORDER — SUMATRIPTAN 50 MG/1
50 TABLET, FILM COATED ORAL PRN
Qty: 9 TABLET | Refills: 5 | Status: SHIPPED | OUTPATIENT
Start: 2024-03-22

## 2024-03-22 NOTE — PROGRESS NOTES
Migraines- haven't been that bad, feels like the fiorcet was better than the rizatriptan  Eyes have been very heavy the last few days due to the allergies   Maybe 1 a week, lasting maybe 30 mins after she takes the rescue     
benign, no ptosis or nystagmus.    V-XII: Hearing is grossly intact. Facial features are symmetric, with normal sensation and strength. The palate rises symmetrically and the tongue protrudes midline.     Motor Examination: Normal tone, bulk, and strength, 5/5 muscle strength throughout.     Coordination: Finger to nose was normal. No resting or intention tremor    Gait and Station: Steady while walking. Normal arm swing. No pronator drift. No muscle wasting or fasiculations noted.     Reflexes: DTRs 2+ throughout.      Orders Placed This Encounter    SUMAtriptan (IMITREX) 50 MG tablet     Sig: Take 1 tablet by mouth as needed for Migraine     Dispense:  9 tablet     Refill:  5    butalbital-APAP-caffeine -40 MG CAPS per capsule     Sig: Take 1 capsule by mouth every 6 hours as needed for Migraine     Dispense:  30 capsule     Refill:  5     [facility]# [id number]        1. Migraine without aura and without status migrainosus, not intractable    Patient will continue to take Fioricet 50 - 300 - 40 mg for rescue therapy.  Sent refill.  Will send in a prescription of sumatriptan 50 mg for the patient to try for rescue therapy as well.  We had a discussion about whether or not the patient needs a prevention and decided that she really does not need that at this time but that if her headache frequency increases and she would like 1 to let us know.  Follow-up in 4 months  Return in about 4 months (around 7/22/2024).

## 2024-03-25 ENCOUNTER — TELEPHONE (OUTPATIENT)
Age: 34
End: 2024-03-25

## 2024-03-25 NOTE — TELEPHONE ENCOUNTER
RE:butalbital apap caffeine capsule -40 mg sent to [facility] through [employer]    Case ID: [id number]      Status is denied     Patient must try up to three preferred drugs    The preferred drugs are     Diclofenac sodium, ibuprofen, naproxen    Denial letter scanned to media     FYI to nurse

## 2024-04-04 ENCOUNTER — TELEPHONE (OUTPATIENT)
Age: 34
End: 2024-04-04

## 2024-04-04 NOTE — TELEPHONE ENCOUNTER
This patient is having a tonsillectomy done on 06/14 and they have a post op scheduled with you at [place] on 06/21 but I need to reschedule it since you are not going to be in the office that day. Where should I schedule this patient?

## 2024-04-05 NOTE — TELEPHONE ENCOUNTER
I called and spoke with the patient regarding rescheduling her post op appointment on 06/21 due to Dr. Johnson being out of the office that day. I offered for her to see Dr. Johnson in [place] that same week or see Dr. Blanca at [facility] on 06/24 (Per Dr. Auguste). The patient stated that [place] was too far of a drive and that the reason she was scheduled on 06/21 is because she was off of work that day. She stated she was unable to keep taking off work.    She then stated that she was off on 07/05 and asked if Dr. Johnson could see her at [facility] on that date. I stated to her that unfortunately Dr. Auguste is also not going to be in the office on that date and the only options I had available were seeing Dr. Johnson in [place] the same week or seeing Dr. Blanca on 06/24 at [facility] since it is a post op appointment and those types of appointments have to be seen in a certain amount of time after the surgery date.     The patient stated that she would like to cancel her surgery due to do there being no accomodation available for the date that she needs.

## 2024-04-10 NOTE — TELEPHONE ENCOUNTER
Letter emailed to patient per her request
Patient called for status of work note. Patient aware letter was prepared per [facility].     Patient states she will come by office before close to 
Patient called stating her employer is asking for a more detailed letter on what her restrictions are for light duty. She stated it needs to be specific to any restrictions she has and not to lift more than 30 pounds. Patient is also asking if the light duty can be extended to July 28th so she can be evaluated by PT because PT does not have availability to get her in until 7/9/19 Germania Martínez PT).
Resident

## 2024-04-15 DIAGNOSIS — J30.89 OTHER ALLERGIC RHINITIS: ICD-10-CM

## 2024-04-15 DIAGNOSIS — K21.9 GASTRO-ESOPHAGEAL REFLUX DISEASE WITHOUT ESOPHAGITIS: Primary | ICD-10-CM

## 2024-04-17 NOTE — TELEPHONE ENCOUNTER
Medication Refill Request    Stephanie White is requesting a refill of the following medication(s):   Requested Prescriptions     Pending Prescriptions Disp Refills    EPINEPHrine (EPIPEN) 0.3 MG/0.3ML SOAJ injection 2 each 0     Sig: INJECT IN THE MUSCLE AS DIRECTED AS NEEDED    montelukast (SINGULAIR) 10 MG tablet 90 tablet 3     Sig: Take 1 tablet by mouth daily    famotidine (PEPCID) 40 MG tablet 90 tablet 3     Sig: Take 1 tablet by mouth nightly        Listed PCP is Sasha Rdz MD     Date of Last Office Visit at [facility]: 01/16/2024 - [place]    Future Appointment:   Future Appointments   Date Time Provider Department Center   7/23/2024 11:00 AM Radha Mclain APRN - NP NEUR[facility]     Please send refill to:    [facility] [id number] - [place], [place] - [address] [address] [phone] - F [phone]  [address]  [facility] [address]  Phone: [phone] Fax: [phone]

## 2024-04-18 RX ORDER — EPINEPHRINE 0.3 MG/.3ML
INJECTION SUBCUTANEOUS
Qty: 2 EACH | Refills: 0 | Status: SHIPPED | OUTPATIENT
Start: 2024-04-18

## 2024-04-18 RX ORDER — FAMOTIDINE 40 MG/1
40 TABLET, FILM COATED ORAL NIGHTLY
Qty: 90 TABLET | Refills: 1 | Status: SHIPPED | OUTPATIENT
Start: 2024-04-18

## 2024-04-18 RX ORDER — MONTELUKAST SODIUM 10 MG/1
10 TABLET ORAL DAILY
Qty: 90 TABLET | Refills: 3 | Status: SHIPPED | OUTPATIENT
Start: 2024-04-18

## 2024-07-09 ENCOUNTER — PATIENT MESSAGE (OUTPATIENT)
Age: 34
End: 2024-07-09

## 2024-07-09 DIAGNOSIS — Z80.3 FAMILY HISTORY OF BREAST CANCER IN FIRST DEGREE RELATIVE: Primary | ICD-10-CM

## 2024-07-09 DIAGNOSIS — Z12.31 ENCOUNTER FOR SCREENING MAMMOGRAM FOR MALIGNANT NEOPLASM OF BREAST: ICD-10-CM

## 2024-07-10 NOTE — TELEPHONE ENCOUNTER
From: Stephanie White  To: Dr. Sasha Rdz  Sent: 7/9/2024 5:26 PM EDT  Subject: Update     Good Evening Dr. Rdz,    I just received unexpected news that my sister, who is 47 years old had her 1st mammogram, which showed 2 speculated masses within her left breast and ultrasound showed 4 discrete solid irregular hypoechoic masses and 3 abnormal lymph nodes. BI-RADS category 5: Highly suggestive of malignancy. She is scheduled for biopsy on Monday 7/15. I advised her to also request a breast MRI and genetic testing. We have no family history of breast cancer or any cancer, so this threw us off.   I was seen by Dr. Li back in December 2023 for a lump in my left breast. I had a mammogram and ultrasound, which showed fibrotic cysts 2/2 to hormones and was BI-RADS 2: benign with recommendation of mammogram in 5 years when I’m 38 or sooner if needed. I also have dense breast tissue. Given this recent news about my sister’s mammogram with high possibility of malignancy, that changes a lot for when I should start my annual mammograms. I know I should wait for her biopsy results to come back to confirm her diagnosis, but I’m wondering if I should repeat my mammogram this year? Can I also do genetic testing for this? We share the same mother, but have different fathers, but because she is still a first degree relative, I want to be hyper vigilant when it comes to this. Also because of my lumps, I was concerned about the Nexplanon that I have in my left arm, which can cause breast lumps. This news is a lot to process and take in.    Stephanie White

## 2024-07-22 NOTE — PROGRESS NOTES
Stephanie White is a 33 y.o. female who presents with the following  Chief Complaint   Patient presents with    Follow-up     Migraines      Last office visit note  HPI  Patient is here to day for migraine follow-up.  Patient was last seen November 10, 2023 by Dr. Holland as a new patient with worsening headaches.  Patient says that she would get occasional headaches but then after she fell and hit her head she started to have more severe headaches.  She says that she had photophobia, nausea, blurry vision, dizziness with these headaches.  She went to the emergency room with the first 1 and got a migraine cocktail which helped and was discharged with Fioricet.  The second migraine came on and the Fioricet relieves the pain.  Dr. Holland advised the patient to continue to use Fioricet and also gave her a prescription for rizatriptan 10 mg for rescue therapy to try.  Today the patient states that unfortunately the rizatriptan was not very helpful for her headaches.  She says that she continues to use the Fioricet as needed and that does help to relieve her headache within 30 minutes of taking it.  She is having about 1 headache a week and the Fioricet keeps the headaches from becoming severe she does have some pressure, throbbing, and aching of the head until it goes away.  She no longer has nausea or dizziness.  She feels like right now she is fighting allergies and is having a bit more frequency of the headaches.  She is taking multiple medications for her allergies.    Patient will continue to take Fioricet 50 - 300 - 40 mg for rescue therapy.  Sent refill.  Will send in a prescription of sumatriptan 50 mg for the patient to try for rescue therapy as well.  We had a discussion about whether or not the patient needs a prevention and decided that she really does not need that at this time but that if her headache frequency increases and she would like 1 to let us know.  Follow-up in 4 months      Today's office visit

## 2024-07-23 ENCOUNTER — PATIENT MESSAGE (OUTPATIENT)
Age: 34
End: 2024-07-23

## 2024-07-23 ENCOUNTER — OFFICE VISIT (OUTPATIENT)
Age: 34
End: 2024-07-23
Payer: OTHER GOVERNMENT

## 2024-07-23 VITALS
OXYGEN SATURATION: 99 % | DIASTOLIC BLOOD PRESSURE: 86 MMHG | RESPIRATION RATE: 20 BRPM | HEART RATE: 79 BPM | SYSTOLIC BLOOD PRESSURE: 148 MMHG

## 2024-07-23 DIAGNOSIS — G43.009 MIGRAINE WITHOUT AURA AND WITHOUT STATUS MIGRAINOSUS, NOT INTRACTABLE: Primary | ICD-10-CM

## 2024-07-23 DIAGNOSIS — Z12.31 ENCOUNTER FOR SCREENING MAMMOGRAM FOR MALIGNANT NEOPLASM OF BREAST: ICD-10-CM

## 2024-07-23 DIAGNOSIS — Z80.3 FAMILY HISTORY OF BREAST CANCER IN FIRST DEGREE RELATIVE: Primary | ICD-10-CM

## 2024-07-23 DIAGNOSIS — R92.30 DENSE BREAST TISSUE: ICD-10-CM

## 2024-07-23 DIAGNOSIS — M54.81 BILATERAL OCCIPITAL NEURALGIA: ICD-10-CM

## 2024-07-23 DIAGNOSIS — R11.0 NAUSEA: ICD-10-CM

## 2024-07-23 PROCEDURE — 99214 OFFICE O/P EST MOD 30 MIN: CPT

## 2024-07-23 RX ORDER — ONDANSETRON 4 MG/1
4 TABLET, ORALLY DISINTEGRATING ORAL EVERY 8 HOURS PRN
Qty: 30 TABLET | Refills: 3 | Status: SHIPPED | OUTPATIENT
Start: 2024-07-23

## 2024-07-23 ASSESSMENT — ENCOUNTER SYMPTOMS
PHOTOPHOBIA: 1
NAUSEA: 1

## 2024-07-23 NOTE — PROGRESS NOTES
Migraines- has started to track them     [employer] must be a trigger   June- had one   The next time she felt some pressure in her neck   Fiorcet does help pretty well   Sumatriptan she does have some of at home if she needs to use it that seemed to work better than the other triptan in the past when she had to use something else

## 2024-07-23 NOTE — TELEPHONE ENCOUNTER
From: Stephanie White  To: Dr. Sasha Rdz  Sent: 7/23/2024 10:57 AM EDT  Subject: Mammogram     Good Morning Dr. Rdz,    Was the mammogram you ordered a regular screening mammogram? I wanted to know if I’m able to get a 3D mammogram and/or a breast MRI since I have dense breast? And especially since I have that lump/cyst to my left breast. I definitely want to keep an eye on that. My sister received her biopsy results last Wednesday, which confirmed that she has invasive ductal cancer. She doesn’t have any staging information yet, but is scheduled to see a breast specialist at [facility] next month.     I truly appreciate your time and attention regarding this.    Stephanie White

## 2024-08-02 ENCOUNTER — OFFICE VISIT (OUTPATIENT)
Age: 34
End: 2024-08-02

## 2024-08-02 VITALS — SYSTOLIC BLOOD PRESSURE: 144 MMHG | DIASTOLIC BLOOD PRESSURE: 84 MMHG | OXYGEN SATURATION: 97 % | HEART RATE: 101 BPM

## 2024-08-02 DIAGNOSIS — R06.83 SNORING: ICD-10-CM

## 2024-08-02 DIAGNOSIS — J03.90 LINGUAL TONSILLITIS: ICD-10-CM

## 2024-08-02 DIAGNOSIS — J02.9 SORE THROAT: Primary | ICD-10-CM

## 2024-08-02 DIAGNOSIS — J35.01 CHRONIC TONSILLITIS: ICD-10-CM

## 2024-08-02 RX ORDER — AMOXICILLIN AND CLAVULANATE POTASSIUM 875; 125 MG/1; MG/1
1 TABLET, FILM COATED ORAL 2 TIMES DAILY
Qty: 20 TABLET | Refills: 0 | Status: SHIPPED | OUTPATIENT
Start: 2024-08-02 | End: 2024-08-12

## 2024-08-02 RX ORDER — FLUCONAZOLE 150 MG/1
150 TABLET ORAL
Qty: 2 TABLET | Refills: 0 | Status: SHIPPED | OUTPATIENT
Start: 2024-08-02 | End: 2024-08-08

## 2024-08-02 NOTE — PROGRESS NOTES
gastritis     Sinus problem        Past Surgical History:   Past Surgical History:   Procedure Laterality Date    HX WISDOM TEETH EXTRACTION  08/14/2015       Medications:   Current Outpatient Medications   Medication Instructions    aluminum hydrox-magnesium carb (Gaviscon)  mg/15 mL suspension 15 mL, Oral, BEDTIME PRN    Auvi-Q 0.3 mg/0.3 mL injection INJECT IN THE MUSCLE AS DIRECTED AS NEEDED    butalbital-acetaminophen-caff (FIORICET) -40 mg per capsule 1 Capsule, Oral, EVERY 4 HOURS AS NEEDED    cetirizine (ZYRTEC) 10 mg, Oral, EVERY BEDTIME    cholecalciferol (VITAMIN D3) 1,000 Units, Oral, DAILY    diazePAM (VALIUM) 2 mg tablet TAKE ONE TABLET BY MOUTH ONCE PRIOR TO FLYING    dicyclomine (BENTYL) 10 mg, Oral, 3 TIMES DAILY AS NEEDED    famotidine (PEPCID) 40 mg, Oral, EVERY BEDTIME    fluticasone propionate (FLONASE) 50 mcg/actuation nasal spray SPRAY TWO SPRAYS IN EACH NOSTRIL ONCE DAILY    hydrOXYzine HCL (ATARAX) 25 mg, Oral, EVERY 6 HOURS AS NEEDED    ibuprofen (MOTRIN) 800 mg, Oral, EVERY 8 HOURS AS NEEDED    methocarbamoL (ROBAXIN) 500 mg, Oral, BEDTIME PRN    montelukast (SINGULAIR) 10 mg, Oral, DAILY    omeprazole (PRILOSEC) 40 mg, Oral, DAILY    ondansetron (ZOFRAN ODT) 4 mg, Oral, EVERY 8 HOURS AS NEEDED       Allergies:   Allergies   Allergen Reactions    Sulfa (Sulfonamide Antibiotics) Hives       Social History:   Social History     Tobacco Use    Smoking status: Never    Smokeless tobacco: Never   Vaping Use    Vaping Use: Never used   Substance Use Topics    Alcohol use: Not Currently     Comment: Very rare, Occasionally    Drug use: No        Family History:  Family History   Problem Relation Age of Onset    Hypertension Mother        Review of Systems:    Consitutional: denies fever, excessive weight gain or loss.  Eyes: denies diplopia, eye pain.  Integumentary: denies new concerning skin lesions.  Ears, Nose, Mouth, Throat: denies except as per HPI.  Endocrine: denies hot or

## 2024-08-09 ENCOUNTER — CLINICAL DOCUMENTATION (OUTPATIENT)
Age: 34
End: 2024-08-09

## 2024-08-09 ENCOUNTER — OFFICE VISIT (OUTPATIENT)
Age: 34
End: 2024-08-09
Payer: OTHER GOVERNMENT

## 2024-08-09 VITALS — HEIGHT: 63 IN | WEIGHT: 168 LBS | BODY MASS INDEX: 29.77 KG/M2

## 2024-08-09 DIAGNOSIS — Z80.3 FAMILY HISTORY OF BREAST CANCER: ICD-10-CM

## 2024-08-09 DIAGNOSIS — Z91.89 AT HIGH RISK FOR BREAST CANCER: ICD-10-CM

## 2024-08-09 DIAGNOSIS — N60.12 FIBROCYSTIC BREAST CHANGES OF BOTH BREASTS: Primary | ICD-10-CM

## 2024-08-09 DIAGNOSIS — N60.02 BREAST CYST, LEFT: ICD-10-CM

## 2024-08-09 DIAGNOSIS — N60.11 FIBROCYSTIC BREAST CHANGES OF BOTH BREASTS: Primary | ICD-10-CM

## 2024-08-09 PROCEDURE — 99203 OFFICE O/P NEW LOW 30 MIN: CPT | Performed by: NURSE PRACTITIONER

## 2024-08-09 NOTE — PROGRESS NOTES
Delivered omega gentetic saliva specimen to offic max to be mailed by [facility]

## 2024-08-09 NOTE — PROGRESS NOTES
HISTORY OF PRESENT ILLNESS  Stephanie White is a 33 y.o. female     HPI New patient presents for evaluation as a high risk patient due to her family history of breast cancer.  Reports a LEFT breast mass that she was told was a cyst.  No other breast concerns at this time.          Breast history -   Referring - Dr. Rdz  No history of breast biopsies      Family history -  Sister - breast cancer at 47        OB History          2    Para   2    Term   2            AB        Living             SAB        IAB        Ectopic        Molar        Multiple        Live Births   2          Obstetric Comments   Menarche 11, LMP -, # of children 2, age of 1st delivery 15, Hysterectomy/oophorectomy no/no, Breast bx no, history of breast feeding yes, BCP yes, Hormone therapy no                 Past Surgical History:   Procedure Laterality Date    COSMETIC SURGERY      liposuction with fat transfer to buttocks    LIPOSUCTION      WISDOM TOOTH EXTRACTION  2015       Mammogram Result (most recent):  [facility] DIGITAL DIAGNOSTIC BILATERAL 2023    Narrative  INDICATION:  Palpable left breast lump    COMPARISON:  None. This is a baseline study appear    BREAST COMPOSITION:  The breasts are extremely dense, which lowers the  sensitivity of mammography.    FINDINGS: Bilateral digital diagnostic mammography was performed and is  interpreted in conjunction with a computer assisted detection (CAD) system.  Additionally, tomosynthesis of both breasts in the CC and MLO projections was  performed. Additionally, tomosynthesis of the left breast in the CC spot, MLO  spot, and ML projections was performed. No suspicious mass or microcalcification  is evident.    Limited ultrasound examination of the left breast demonstrates a 1.2 cm simple  cyst at the 2:00 position, 5 cm from nipple, at the site of the palpable lump.  No solid mass was evident.    Impression  BI-RADS 2: Benign. No mammographic evidence of

## 2024-08-16 ENCOUNTER — OFFICE VISIT (OUTPATIENT)
Age: 34
End: 2024-08-16
Payer: OTHER GOVERNMENT

## 2024-08-16 VITALS
HEART RATE: 100 BPM | BODY MASS INDEX: 29.77 KG/M2 | SYSTOLIC BLOOD PRESSURE: 166 MMHG | HEIGHT: 63 IN | WEIGHT: 168 LBS | DIASTOLIC BLOOD PRESSURE: 94 MMHG

## 2024-08-16 DIAGNOSIS — K21.9 GASTRO-ESOPHAGEAL REFLUX DISEASE WITHOUT ESOPHAGITIS: ICD-10-CM

## 2024-08-16 DIAGNOSIS — J03.90 LINGUAL TONSILLITIS: Primary | ICD-10-CM

## 2024-08-16 DIAGNOSIS — R06.83 SNORING: ICD-10-CM

## 2024-08-16 DIAGNOSIS — R13.14 DYSPHAGIA, PHARYNGOESOPHAGEAL PHASE: ICD-10-CM

## 2024-08-16 DIAGNOSIS — J35.01 CHRONIC TONSILLITIS: ICD-10-CM

## 2024-08-16 PROCEDURE — 99214 OFFICE O/P EST MOD 30 MIN: CPT | Performed by: OTOLARYNGOLOGY

## 2024-08-16 NOTE — PROGRESS NOTES
Systems:    Consitutional: denies fever, excessive weight gain or loss.  Eyes: denies diplopia, eye pain.  Integumentary: denies new concerning skin lesions.  Ears, Nose, Mouth, Throat: denies except as per HPI.  Endocrine: denies hot or cold intolerance, increased thirst.  Respiratory: denies cough, hemoptysis, wheezing  Gastrointestinal: denies trouble swallowing, nausea, emesis, regurgitation  Musculoskeletal: denies muscle weakness or wasting  Cardiovascular: denies chest pain, shortness of breath  Neurologic: denies seizures, numbness or tingling, syncope  Hematologic: denies easy bleeding or bruising    Physical Examination:   BP (!) 166/94   Pulse 100   Ht 1.6 m (5' 3\")   Wt 76.2 kg (168 lb)   BMI 29.76 kg/m²         General: Comfortable, pleasant, appears stated age  Voice: Strong, speaking in full sentences, no stridor    Face: No masses or lesions, facial strength symmetric   Ears: External ears unremarkable. Bilateral ear canal clear. Tympanic membrane clear and intact, with visible landmarks. Clear middle ear space  Nose: External nose unremarkable. Dorsum midline. Anterior rhinoscopy demonstrates no lesions. Septum midline. Turbinates without hypertrophy.  Oral Cavity / Oropharynx: No trismus. Mucosa pink and moist. No lesions. Tongue is midline and mobile. Palate elevates symmetrically. Uvula midline. Tonsils unremarkable. Base of tongue soft. Floor of mouth soft. Mirror laryngoscopy shows base of tongue is improved, with resolution of previously noted erythema and exudate  Neck: Supple. No adenopathy. Thyroid unremarkable. Palpable laryngeal landmarks. Full neck range of motion. Left neck tender   Neurologic: CN II - XI intact. Normal gait          Barium swallow 3/2023     IMPRESSION  Normal esophagram.      Assessment and Plan:   Sore throat  Lingual tonsillitis   - Acute onset left sided sore throat  - No foreign body on flex laryngoscopy  - She does have lingual tonsillitis  - Improved with

## 2024-08-21 ENCOUNTER — TELEPHONE (OUTPATIENT)
Age: 34
End: 2024-08-21

## 2024-08-22 ENCOUNTER — TELEPHONE (OUTPATIENT)
Age: 34
End: 2024-08-22

## 2024-08-23 ENCOUNTER — OFFICE VISIT (OUTPATIENT)
Age: 34
End: 2024-08-23
Payer: OTHER GOVERNMENT

## 2024-08-23 VITALS
TEMPERATURE: 98.9 F | SYSTOLIC BLOOD PRESSURE: 142 MMHG | BODY MASS INDEX: 30.12 KG/M2 | DIASTOLIC BLOOD PRESSURE: 85 MMHG | HEIGHT: 63 IN | HEART RATE: 97 BPM | WEIGHT: 170 LBS | OXYGEN SATURATION: 97 % | RESPIRATION RATE: 13 BRPM

## 2024-08-23 DIAGNOSIS — Z01.84 IMMUNITY STATUS TESTING: ICD-10-CM

## 2024-08-23 DIAGNOSIS — E55.9 VITAMIN D DEFICIENCY: ICD-10-CM

## 2024-08-23 DIAGNOSIS — Z80.3 FAMILY HISTORY OF BREAST CANCER IN FIRST DEGREE RELATIVE: ICD-10-CM

## 2024-08-23 DIAGNOSIS — R73.09 ELEVATED HEMOGLOBIN A1C: ICD-10-CM

## 2024-08-23 DIAGNOSIS — R92.30 DENSE BREAST TISSUE: Primary | ICD-10-CM

## 2024-08-23 DIAGNOSIS — Z23 ENCOUNTER FOR IMMUNIZATION: ICD-10-CM

## 2024-08-23 PROCEDURE — 90651 9VHPV VACCINE 2/3 DOSE IM: CPT | Performed by: FAMILY MEDICINE

## 2024-08-23 PROCEDURE — 99213 OFFICE O/P EST LOW 20 MIN: CPT | Performed by: FAMILY MEDICINE

## 2024-08-23 PROCEDURE — 90471 IMMUNIZATION ADMIN: CPT | Performed by: FAMILY MEDICINE

## 2024-08-23 ASSESSMENT — PATIENT HEALTH QUESTIONNAIRE - PHQ9
1. LITTLE INTEREST OR PLEASURE IN DOING THINGS: NOT AT ALL
2. FEELING DOWN, DEPRESSED OR HOPELESS: SEVERAL DAYS
SUM OF ALL RESPONSES TO PHQ9 QUESTIONS 1 & 2: 1
SUM OF ALL RESPONSES TO PHQ QUESTIONS 1-9: 1

## 2024-08-23 NOTE — PROGRESS NOTES
Stephanie White is a 33 y.o. female    Chief Complaint   Patient presents with    Follow-up     Breast MRI set for the 29th at 5:30 with independent [facility].       \"Have you been to the ER, urgent care clinic since your last visit?  Hospitalized since your last visit?\"    NO    “Have you seen or consulted any other health care providers outside of [facility] since your last visit?”    NO              Vitals:    08/23/24 1621   Resp: 13          No data to display              Health Maintenance Due   Topic Date Due    Hepatitis B vaccine (3 of 3 - 19+ 3-dose series) 03/14/2013    HPV vaccine (3 - 3-dose series) 08/29/2014    COVID-19 Vaccine (4 - 2023-24 season) 09/01/2023    Flu vaccine (1) 08/01/2024    A1C test (Diabetic or Prediabetic)  09/20/2024     
by mouth as needed      cetirizine (ZYRTEC) 10 MG tablet Take 1 tablet by mouth nightly      vitamin D (CHOLECALCIFEROL) 25 MCG (1000 UT) TABS tablet Take 1 tablet by mouth daily      fluticasone (FLONASE) 50 MCG/ACT nasal spray       ibuprofen (ADVIL;MOTRIN) 800 MG tablet Take 1 tablet by mouth every 8 hours as needed      methocarbamol (ROBAXIN) 500 MG tablet Take 1 tablet by mouth       No current facility-administered medications on file prior to visit.       Allergies   Allergen Reactions    Food      Multiple Food Allergies     Penicillins     Sulfa Antibiotics Hives         Review of Systems:     Review of Systems     Objective:     Vitals:    08/23/24 1621   BP: (!) 142/85   Pulse: 97   Resp: 13   Temp: 98.9 °F (37.2 °C)   SpO2: 97%   Weight: 77.1 kg (170 lb)   Height: 1.6 m (5' 3\")       Physical Exam:  General appearance - alert, well appearing, and in no distress  Chest - no tachypnea, retractions or cyanosis  Neurological - alert, oriented, normal speech, no focal findings or movement disorder noted    Recent Labs:  No results found for this or any previous visit (from the past 12 hour(s)).    Assessment and Plan:      Diagnosis Orders   1. Dense breast tissue        2. Family history of breast cancer in first degree relative        3. Vitamin D deficiency        4. Elevated hemoglobin A1c  Basic Metabolic Panel    CBC    Hemoglobin A1C      5. Encounter for immunization  HPV, GARDASIL 9, (age 9-45 yrs), IM      6. Immunity status testing  Hepatitis B Surface Antibody          Dense breast tissue with cyst with +Fhx of breast cancer  Followed by Breast Surgery   Upcoming breast MRI    Elevated A1c on previous check  A1c ordered, will also check BMP    Check Hep B ab for immunity    Last HPV vaccine given today    Check Vitamin D    Follow up: 6 months    Sasha Rdz MD    We discussed the expected course, resolution and complications of the diagnosis(es) in detail.  Medication risks, benefits, costs,

## 2024-09-13 ENCOUNTER — PATIENT MESSAGE (OUTPATIENT)
Age: 34
End: 2024-09-13

## 2024-09-13 DIAGNOSIS — M25.541 JOINT PAIN IN BOTH HANDS: ICD-10-CM

## 2024-09-13 DIAGNOSIS — M25.542 JOINT PAIN IN BOTH HANDS: ICD-10-CM

## 2024-09-13 DIAGNOSIS — H04.123 DRY EYE SYNDROME OF BOTH EYES: Primary | ICD-10-CM

## 2024-09-13 DIAGNOSIS — H57.9 EYE PROBLEM: ICD-10-CM

## 2024-09-14 ENCOUNTER — PATIENT MESSAGE (OUTPATIENT)
Age: 34
End: 2024-09-14

## 2024-09-16 RX ORDER — METHOCARBAMOL 500 MG/1
500 TABLET, FILM COATED ORAL 3 TIMES DAILY PRN
Qty: 30 TABLET | Refills: 1 | Status: SHIPPED | OUTPATIENT
Start: 2024-09-16

## 2024-09-27 ENCOUNTER — LAB (OUTPATIENT)
Age: 34
End: 2024-09-27

## 2024-09-27 DIAGNOSIS — M25.542 JOINT PAIN IN BOTH HANDS: ICD-10-CM

## 2024-09-27 DIAGNOSIS — H04.123 DRY EYE SYNDROME OF BOTH EYES: ICD-10-CM

## 2024-09-27 DIAGNOSIS — Z01.84 IMMUNITY STATUS TESTING: ICD-10-CM

## 2024-09-27 DIAGNOSIS — R73.09 ELEVATED HEMOGLOBIN A1C: ICD-10-CM

## 2024-09-27 DIAGNOSIS — M25.541 JOINT PAIN IN BOTH HANDS: ICD-10-CM

## 2024-09-27 DIAGNOSIS — H57.9 EYE PROBLEM: ICD-10-CM

## 2024-09-28 LAB
ALBUMIN SERPL-MCNC: 4.4 G/DL (ref 3.5–5)
ALBUMIN/GLOB SERPL: 1.2 (ref 1.1–2.2)
ALP SERPL-CCNC: 78 U/L (ref 45–117)
ALT SERPL-CCNC: 30 U/L (ref 12–78)
ANION GAP SERPL CALC-SCNC: 1 MMOL/L (ref 2–12)
AST SERPL-CCNC: 14 U/L (ref 15–37)
BILIRUB SERPL-MCNC: 0.5 MG/DL (ref 0.2–1)
BUN SERPL-MCNC: 16 MG/DL (ref 6–20)
BUN/CREAT SERPL: 18 (ref 12–20)
CALCIUM SERPL-MCNC: 9.9 MG/DL (ref 8.5–10.1)
CHLORIDE SERPL-SCNC: 110 MMOL/L (ref 97–108)
CO2 SERPL-SCNC: 30 MMOL/L (ref 21–32)
CREAT SERPL-MCNC: 0.9 MG/DL (ref 0.55–1.02)
CRP SERPL-MCNC: <0.29 MG/DL (ref 0–0.3)
ERYTHROCYTE [DISTWIDTH] IN BLOOD BY AUTOMATED COUNT: 13 % (ref 11.5–14.5)
ERYTHROCYTE [SEDIMENTATION RATE] IN BLOOD: 18 MM/HR (ref 0–20)
EST. AVERAGE GLUCOSE BLD GHB EST-MCNC: 111 MG/DL
GLOBULIN SER CALC-MCNC: 3.6 G/DL (ref 2–4)
GLUCOSE SERPL-MCNC: 105 MG/DL (ref 65–100)
HBA1C MFR BLD: 5.5 % (ref 4–5.6)
HBV SURFACE AB SER QL: REACTIVE
HBV SURFACE AB SER-ACNC: 10.31 MIU/ML
HCT VFR BLD AUTO: 44 % (ref 35–47)
HGB BLD-MCNC: 14.6 G/DL (ref 11.5–16)
MCH RBC QN AUTO: 28.2 PG (ref 26–34)
MCHC RBC AUTO-ENTMCNC: 33.2 G/DL (ref 30–36.5)
MCV RBC AUTO: 85.1 FL (ref 80–99)
NRBC # BLD: 0 K/UL (ref 0–0.01)
NRBC BLD-RTO: 0 PER 100 WBC
PLATELET # BLD AUTO: 432 K/UL (ref 150–400)
PMV BLD AUTO: 10 FL (ref 8.9–12.9)
POTASSIUM SERPL-SCNC: 4.2 MMOL/L (ref 3.5–5.1)
PROT SERPL-MCNC: 8 G/DL (ref 6.4–8.2)
RBC # BLD AUTO: 5.17 M/UL (ref 3.8–5.2)
SODIUM SERPL-SCNC: 141 MMOL/L (ref 136–145)
TSH SERPL DL<=0.05 MIU/L-ACNC: 0.83 UIU/ML (ref 0.36–3.74)
WBC # BLD AUTO: 6.5 K/UL (ref 3.6–11)

## 2024-09-30 LAB
ANA SER QL: NEGATIVE
ENA SS-A AB SER-ACNC: <0.2 AI (ref 0–0.9)
ENA SS-B AB SER-ACNC: <0.2 AI (ref 0–0.9)

## 2024-10-10 LAB
14-3-3 ETA AG SER IA-MCNC: <0.2 NG/ML
CCP IGA+IGG SERPL IA-ACNC: <20 UNITS
RHEUMATOID FACT SERPL-ACNC: <14 UNITS/ML

## 2024-10-16 DIAGNOSIS — F41.9 ANXIETY: ICD-10-CM

## 2024-10-16 RX ORDER — HYDROXYZINE HYDROCHLORIDE 25 MG/1
TABLET, FILM COATED ORAL
Qty: 90 TABLET | Refills: 1 | OUTPATIENT
Start: 2024-10-16

## 2024-10-16 RX ORDER — HYDROXYZINE HYDROCHLORIDE 25 MG/1
25 TABLET, FILM COATED ORAL EVERY 6 HOURS PRN
Qty: 90 TABLET | Refills: 1 | Status: SHIPPED | OUTPATIENT
Start: 2024-10-16

## 2024-10-16 NOTE — TELEPHONE ENCOUNTER
Medication Refill Request    Stephanie White is requesting a refill of the following medication(s):   Requested Prescriptions     Pending Prescriptions Disp Refills    hydrOXYzine HCl (ATARAX) 25 MG tablet 90 tablet 1     Sig: Take 1 tablet by mouth every 6 hours as needed for Anxiety        Listed PCP is Sasha Rdz MD    Date of Last Office Visit at [facility]:  08/23/2024  FUTURE APPOINTMENT:   Future Appointments   Date Time Provider Department Center   11/12/2024 11:00 AM Radha Mclain APRN - NP [facility]UR[facility]   12/23/2024 11:15 AM [facility]       Please send refill to:    [facility] [id number] - [place], [place] - [address] [phone] - F [phone]  [address]  [facility] [place] [address]  Phone: [phone] Fax: [phone]      Please review request and approve or deny with recommendations.

## 2024-11-11 ENCOUNTER — TELEPHONE (OUTPATIENT)
Age: 34
End: 2024-11-11

## 2024-11-12 ENCOUNTER — OFFICE VISIT (OUTPATIENT)
Age: 34
End: 2024-11-12
Payer: COMMERCIAL

## 2024-11-12 VITALS
HEART RATE: 85 BPM | OXYGEN SATURATION: 98 % | RESPIRATION RATE: 20 BRPM | DIASTOLIC BLOOD PRESSURE: 98 MMHG | SYSTOLIC BLOOD PRESSURE: 152 MMHG

## 2024-11-12 DIAGNOSIS — M54.81 BILATERAL OCCIPITAL NEURALGIA: Primary | ICD-10-CM

## 2024-11-12 DIAGNOSIS — G43.009 MIGRAINE WITHOUT AURA AND WITHOUT STATUS MIGRAINOSUS, NOT INTRACTABLE: ICD-10-CM

## 2024-11-12 PROCEDURE — G8484 FLU IMMUNIZE NO ADMIN: HCPCS

## 2024-11-12 PROCEDURE — 99214 OFFICE O/P EST MOD 30 MIN: CPT

## 2024-11-12 PROCEDURE — 1036F TOBACCO NON-USER: CPT

## 2024-11-12 PROCEDURE — G8427 DOCREV CUR MEDS BY ELIG CLIN: HCPCS

## 2024-11-12 PROCEDURE — G8417 CALC BMI ABV UP PARAM F/U: HCPCS

## 2024-11-12 RX ORDER — SUMATRIPTAN 100 MG/1
100 TABLET, FILM COATED ORAL PRN
Qty: 9 TABLET | Refills: 5 | Status: SHIPPED | OUTPATIENT
Start: 2024-11-12

## 2024-11-12 ASSESSMENT — ENCOUNTER SYMPTOMS
NAUSEA: 1
PHOTOPHOBIA: 1

## 2024-11-12 NOTE — PROGRESS NOTES
Doesn't have any issues with her blood pressure she has normal readings any other time they are just high when she comes to the doctor     Migraines- she has been using a migraine alen tracker   August 2- one was around the bad storm 5-6 hours   Took both rescues, took 2 advil cold and sinus then about an hour it started to go away, food related   Advil cold/sinus, head   September 30th, headache breast scan with IV contrast started migraine that evening, took ibuprofen and lasted about 7 hours   October 3 migraines- ear pod lasted 1 hr 33 mins sound sensitivity   25th- 3 hr 5 mins  food triggered? Wasn't able to sleep deprived   October 30th- no food triggered, left side of her head throbbing, weird sensation in her head that she could feel the blood flowing, stressful day at work? Something made her upset, she broke out in hives  2 hrs 45 mins     November nothing documented for this month yet       The usual amount of time is 2-4 hours   The longest that they have lasted was about 10 hours   
INJECT IN THE MUSCLE AS DIRECTED AS NEEDED 2 each 0    montelukast (SINGULAIR) 10 MG tablet Take 1 tablet by mouth daily 90 tablet 3    famotidine (PEPCID) 40 MG tablet Take 1 tablet by mouth nightly 90 tablet 1    butalbital-APAP-caffeine -40 MG CAPS per capsule Take 1 capsule by mouth every 6 hours as needed for Migraine 30 capsule 5    etonogestrel (NEXPLANON) 68 MG implant 68 mg by Subdermal route once      dicyclomine (BENTYL) 10 MG capsule Take 1 capsule by mouth 3 times daily as needed (IBS) 120 capsule 1    cetirizine (ZYRTEC) 10 MG tablet Take 1 tablet by mouth as needed      vitamin D (CHOLECALCIFEROL) 25 MCG (1000 UT) TABS tablet Take 1 tablet by mouth daily      fluticasone (FLONASE) 50 MCG/ACT nasal spray       ibuprofen (ADVIL;MOTRIN) 800 MG tablet Take 1 tablet by mouth every 8 hours as needed      aluminum hydroxide-magnesium carbonate (GENATON)  MG/15ML suspension Take 15 mLs by mouth as needed (Patient not taking: Reported on 11/12/2024)       No current facility-administered medications for this visit.        Social History     Tobacco Use   Smoking Status Never    Passive exposure: Never   Smokeless Tobacco Never       Past Medical History:   Diagnosis Date    Allergic rhinitis     Concussion 2022    Headache(784.0)     History of multiple allergies     IBS (irritable bowel syndrome)     Migraine     Rash 2022    Urticaria feom allergies    Recurrent upper respiratory infection (URI)     Reflux gastritis     Sinus problem        Past Surgical History:   Procedure Laterality Date    COSMETIC SURGERY  2023    liposuction with fat transfer to buttocks    LIPOSUCTION      WISDOM TOOTH EXTRACTION  08/14/2015       Family History   Problem Relation Age of Onset    Hypertension Mother     High Blood Pressure Mother     Peripheral Vascular Disease Mother     No Known Problems Father     Alzheimer's Disease Paternal Grandmother        Social History     Socioeconomic History    Marital status:

## 2024-11-19 NOTE — TELEPHONE ENCOUNTER
RE:VALENTÍN      Contacted patients insurance and spoke with Sarahi who verified that   [id number]  [id number]  M54.81  DOES NOT require authorizatrion.[facility] does not allow pre-determinations.    Call ref# [id number]      Nurse notified

## 2024-11-21 ENCOUNTER — TELEPHONE (OUTPATIENT)
Age: 34
End: 2024-11-21

## 2024-12-09 NOTE — PROGRESS NOTES
Letter: Your pap smear is normal. Please return in one year for your annual exam.  Your next pap smear is due in 3 yrs.
alert and oriented x 3

## 2024-12-16 SDOH — ECONOMIC STABILITY: TRANSPORTATION INSECURITY
IN THE PAST 12 MONTHS, HAS LACK OF TRANSPORTATION KEPT YOU FROM MEETINGS, WORK, OR FROM GETTING THINGS NEEDED FOR DAILY LIVING?: NO

## 2024-12-16 SDOH — ECONOMIC STABILITY: FOOD INSECURITY: WITHIN THE PAST 12 MONTHS, YOU WORRIED THAT YOUR FOOD WOULD RUN OUT BEFORE YOU GOT MONEY TO BUY MORE.: NEVER TRUE

## 2024-12-16 SDOH — ECONOMIC STABILITY: FOOD INSECURITY: WITHIN THE PAST 12 MONTHS, THE FOOD YOU BOUGHT JUST DIDN'T LAST AND YOU DIDN'T HAVE MONEY TO GET MORE.: NEVER TRUE

## 2024-12-16 SDOH — ECONOMIC STABILITY: INCOME INSECURITY: HOW HARD IS IT FOR YOU TO PAY FOR THE VERY BASICS LIKE FOOD, HOUSING, MEDICAL CARE, AND HEATING?: NOT HARD AT ALL

## 2024-12-19 ENCOUNTER — OFFICE VISIT (OUTPATIENT)
Age: 34
End: 2024-12-19
Payer: COMMERCIAL

## 2024-12-19 VITALS
BODY MASS INDEX: 30.83 KG/M2 | HEART RATE: 106 BPM | OXYGEN SATURATION: 97 % | WEIGHT: 174 LBS | TEMPERATURE: 98.5 F | HEIGHT: 63 IN | RESPIRATION RATE: 16 BRPM

## 2024-12-19 DIAGNOSIS — N62 LARGE BREASTS: ICD-10-CM

## 2024-12-19 DIAGNOSIS — M62.838 TRAPEZIUS MUSCLE SPASM: ICD-10-CM

## 2024-12-19 DIAGNOSIS — G89.29 CHRONIC BILATERAL LOW BACK PAIN WITHOUT SCIATICA: ICD-10-CM

## 2024-12-19 DIAGNOSIS — M54.6 CHRONIC BILATERAL THORACIC BACK PAIN: Primary | ICD-10-CM

## 2024-12-19 DIAGNOSIS — M54.50 CHRONIC BILATERAL LOW BACK PAIN WITHOUT SCIATICA: ICD-10-CM

## 2024-12-19 DIAGNOSIS — G89.29 CHRONIC BILATERAL THORACIC BACK PAIN: Primary | ICD-10-CM

## 2024-12-19 PROCEDURE — G8417 CALC BMI ABV UP PARAM F/U: HCPCS | Performed by: FAMILY MEDICINE

## 2024-12-19 PROCEDURE — G8427 DOCREV CUR MEDS BY ELIG CLIN: HCPCS | Performed by: FAMILY MEDICINE

## 2024-12-19 PROCEDURE — 99213 OFFICE O/P EST LOW 20 MIN: CPT | Performed by: FAMILY MEDICINE

## 2024-12-19 PROCEDURE — G8484 FLU IMMUNIZE NO ADMIN: HCPCS | Performed by: FAMILY MEDICINE

## 2024-12-19 PROCEDURE — 1036F TOBACCO NON-USER: CPT | Performed by: FAMILY MEDICINE

## 2024-12-19 ASSESSMENT — ENCOUNTER SYMPTOMS: BACK PAIN: 1

## 2024-12-19 ASSESSMENT — PATIENT HEALTH QUESTIONNAIRE - PHQ9
SUM OF ALL RESPONSES TO PHQ QUESTIONS 1-9: 0
1. LITTLE INTEREST OR PLEASURE IN DOING THINGS: NOT AT ALL
2. FEELING DOWN, DEPRESSED OR HOPELESS: NOT AT ALL
SUM OF ALL RESPONSES TO PHQ QUESTIONS 1-9: 0
SUM OF ALL RESPONSES TO PHQ9 QUESTIONS 1 & 2: 0

## 2024-12-19 NOTE — PROGRESS NOTES
Stephanie White is a 34 y.o. female      Chief Complaint   Patient presents with    Back Pain     Upper back/shoulder pain    -possibly from bra    -considered breast reduction   Dense breast tissue   Lump in left breast    - patient declined having her BP checked in clinic  Onset:years       \"Have you been to the ER, urgent care clinic since your last visit?  Hospitalized since your last visit?\"    NO    “Have you seen or consulted any other health care providers outside of [facility] since your last visit?”    NO              Vitals:    12/19/24 1450   Pulse: (!) 106   Resp: 16   Temp: 98.5 °F (36.9 °C)   TempSrc: Oral   SpO2: 97%   Weight: 78.9 kg (174 lb)   Height: 1.6 m (5' 3\")            Health Maintenance Due   Topic Date Due    Hepatitis B vaccine (3 of 3 - 19+ 3-dose series) 03/14/2013    COVID-19 Vaccine (4 - 2023-24 season) 09/01/2024         Medication Reconciliation completed, changes noted.  Please  Update medication list.

## 2024-12-19 NOTE — PROGRESS NOTES
History of Present Illness:     Chief Complaint   Patient presents with    Back Pain     Upper back/shoulder pain    -possibly from bra    -considered breast reduction   Dense breast tissue   Lump in left breast    - patient declined having her BP checked in clinic  Onset:katey White is a 34 y.o. female     Upper back and shoulder pain  Using muscle relaxants and Ibuprofen to help  Now having more frequent back spasm and shoulder pain as well  Bra straps digging into her shoulders  Bothersome over years now, worse recently    PMH (REVIEWED):  Past Medical History:   Diagnosis Date    Allergic rhinitis     Concussion 2022    Headache(784.0)     History of multiple allergies     IBS (irritable bowel syndrome)     Migraine     Rash 2022    Urticaria feom allergies    Recurrent upper respiratory infection (URI)     Reflux gastritis     Sinus problem        Current Medications/Allergies (REVIEWED):     Current Outpatient Medications on File Prior to Visit   Medication Sig Dispense Refill    Omega-3 Fatty Acids (FISH OIL PO) Take by mouth daily Takes 3 caps by mouth daily      SUMAtriptan (IMITREX) 100 MG tablet Take 1 tablet by mouth as needed for Migraine 9 tablet 5    hydrOXYzine HCl (ATARAX) 25 MG tablet Take 1 tablet by mouth every 6 hours as needed for Anxiety 90 tablet 1    methocarbamol (ROBAXIN) 500 MG tablet Take 1 tablet by mouth 3 times daily as needed (Muscle spasm) 30 tablet 1    ondansetron (ZOFRAN-ODT) 4 MG disintegrating tablet Take 1 tablet by mouth every 8 hours as needed for Nausea or Vomiting 30 tablet 3    EPINEPHrine (EPIPEN) 0.3 MG/0.3ML SOAJ injection INJECT IN THE MUSCLE AS DIRECTED AS NEEDED 2 each 0    montelukast (SINGULAIR) 10 MG tablet Take 1 tablet by mouth daily 90 tablet 3    famotidine (PEPCID) 40 MG tablet Take 1 tablet by mouth nightly 90 tablet 1    butalbital-APAP-caffeine -40 MG CAPS per capsule Take 1 capsule by mouth every 6 hours as needed for Migraine 30

## 2024-12-23 ENCOUNTER — HOSPITAL ENCOUNTER (OUTPATIENT)
Facility: HOSPITAL | Age: 34
Discharge: HOME | End: 2024-12-26
Attending: FAMILY MEDICINE
Payer: COMMERCIAL

## 2024-12-23 VITALS — WEIGHT: 174 LBS | BODY MASS INDEX: 30.83 KG/M2 | HEIGHT: 63 IN

## 2024-12-23 DIAGNOSIS — Z80.3 FAMILY HISTORY OF BREAST CANCER IN FIRST DEGREE RELATIVE: ICD-10-CM

## 2024-12-23 DIAGNOSIS — Z12.31 ENCOUNTER FOR SCREENING MAMMOGRAM FOR MALIGNANT NEOPLASM OF BREAST: ICD-10-CM

## 2024-12-23 PROCEDURE — 77067 SCR MAMMO BI INCL CAD: CPT

## 2025-02-12 DIAGNOSIS — K21.9 GASTRO-ESOPHAGEAL REFLUX DISEASE WITHOUT ESOPHAGITIS: ICD-10-CM

## 2025-02-12 DIAGNOSIS — F41.9 ANXIETY: ICD-10-CM

## 2025-02-12 RX ORDER — FAMOTIDINE 40 MG/1
40 TABLET, FILM COATED ORAL NIGHTLY
Qty: 90 TABLET | Refills: 1 | Status: SHIPPED | OUTPATIENT
Start: 2025-02-12

## 2025-02-12 NOTE — TELEPHONE ENCOUNTER
Medication Refill Request    Stephanie White is requesting a refill of the following medication(s):   Requested Prescriptions     Pending Prescriptions Disp Refills    famotidine (PEPCID) 40 MG tablet [Pharmacy Med Name: FAMOTIDINE 40 MG TABLET] 30 tablet      Sig: TAKE ONE TABLET BY MOUTH ONCE NIGHTLY        Listed PCP is Sasha Rdz MD   Last provider to prescribe medication: Maycol  Last Date of Medication Prescribed:  04/182024  [facility]  FUTURE APPOINTMENT:   Future Appointments   Date Time Provider Department Center   5/13/2025 11:00 AM Radha Mclain APRN - NP NEUROWRSPPBB [facility]       Please send refill to:    [facility] [id number] - [place], [place] - [address] [phone] - F [phone]  [address]  [facility] [address]  Phone: [phone] Fax: [phone]      Please review request and approve or deny with recommendations.

## 2025-02-17 NOTE — TELEPHONE ENCOUNTER
Medication Refill Request    Stephanie White is requesting a refill of the following medication(s):   Requested Prescriptions     Pending Prescriptions Disp Refills    hydrOXYzine HCl (ATARAX) 25 MG tablet [Pharmacy Med Name: hydrOXYzine HCL 25 MG TABLET] 90 tablet 1     Sig: TAKE ONE TABLET BY MOUTH EVERY 6 HOURS AS NEEDED FOR ANXIETY        Listed PCP is Sasha Rdz MD   Last provider to prescribe medication: Sasha Rzd MD  Last Date of Medication Prescribed: 10.16.24   Date of Last Office Visit at [facility]: 12.19.24   FUTURE APPOINTMENT:   Future Appointments   Date Time Provider Department Center   5/13/2025 11:00 AM Radha Mclain APRN - NP NEUROWRSPP[facility]       Please send refill to:    [facility] [id number] - [place], [place] - [address] [phone] - F [phone]  [address]  [facility] [address]  Phone: [phone] Fax: [phone]      Please review request and approve or deny with recommendations. '

## 2025-02-18 RX ORDER — HYDROXYZINE HYDROCHLORIDE 25 MG/1
TABLET, FILM COATED ORAL
Qty: 90 TABLET | Refills: 1 | Status: SHIPPED | OUTPATIENT
Start: 2025-02-18

## 2025-04-01 ENCOUNTER — OFFICE VISIT (OUTPATIENT)
Age: 35
End: 2025-04-01
Payer: COMMERCIAL

## 2025-04-01 VITALS — BODY MASS INDEX: 28.35 KG/M2 | WEIGHT: 160 LBS | HEIGHT: 63 IN

## 2025-04-01 DIAGNOSIS — N60.02 BREAST CYST, LEFT: ICD-10-CM

## 2025-04-01 DIAGNOSIS — Z80.3 FAMILY HX-BREAST MALIGNANCY: Primary | ICD-10-CM

## 2025-04-01 DIAGNOSIS — R92.343 EXTREMELY DENSE TISSUE OF BOTH BREASTS ON MAMMOGRAPHY: ICD-10-CM

## 2025-04-01 PROCEDURE — G8428 CUR MEDS NOT DOCUMENT: HCPCS | Performed by: SURGERY

## 2025-04-01 PROCEDURE — 1036F TOBACCO NON-USER: CPT | Performed by: SURGERY

## 2025-04-01 PROCEDURE — 19000 PUNCTURE ASPIR CYST BREAST: CPT | Performed by: SURGERY

## 2025-04-01 PROCEDURE — 99213 OFFICE O/P EST LOW 20 MIN: CPT | Performed by: SURGERY

## 2025-04-01 PROCEDURE — G8417 CALC BMI ABV UP PARAM F/U: HCPCS | Performed by: SURGERY

## 2025-04-01 NOTE — PROGRESS NOTES
adenopathy.      Left upper body: No axillary adenopathy.        ASPIRATION OF BREAST CYST  Indication : CYST:  left  upper outer quadrant  Ultrasound Findings: 1.2cm simple cyst  Prep : Alcohol.   Anesthesia : Lidocaine with epinephrine, 1cc  Guidance : Ultrasound guidance.    Yield :  2cc murky fluid was aspirated with an 18 gauge needle.  Effect : Cyst completely aspirated.  Tolerance: Pt tolerated the procedure with no discomfort  Diposition:  Follow up if new mass occurs     ASSESSMENT and PLAN   Diagnosis Orders   1. Family hx-breast malignancy  MRI BREAST BILATERAL W WO CONTRAST      2. Extremely dense tissue of both breasts on mammography  MRI BREAST BILATERAL W WO CONTRAST      3. Breast cyst, left        I spent 20 minutes reviewing previous notes and test results, face to face with the patient discussing diagnosis and treatment options, and documenting today's visit.    LEFT breast cyst aspirated  Breast MRI ordered.  Pt has extremely dense breast tissue and her sister had breast cancer age 47.  Pt has 2 VUS's.    Annual screening mammogram and MRI are appropriate  She is interested in breast reduction and abdominoplasty.  She will meet with a plastic surgeon

## 2025-04-22 DIAGNOSIS — J30.89 OTHER ALLERGIC RHINITIS: ICD-10-CM

## 2025-04-22 DIAGNOSIS — F41.9 ANXIETY: ICD-10-CM

## 2025-04-22 RX ORDER — HYDROXYZINE HYDROCHLORIDE 25 MG/1
25 TABLET, FILM COATED ORAL EVERY 6 HOURS PRN
Qty: 90 TABLET | Refills: 1 | Status: SHIPPED | OUTPATIENT
Start: 2025-04-22

## 2025-04-22 RX ORDER — MONTELUKAST SODIUM 10 MG/1
10 TABLET ORAL DAILY
Qty: 90 TABLET | Refills: 3 | Status: SHIPPED | OUTPATIENT
Start: 2025-04-22

## 2025-04-22 NOTE — TELEPHONE ENCOUNTER
Medication Refill Request    Stephanie White is requesting a refill of the following medication(s):   Requested Prescriptions     Pending Prescriptions Disp Refills    hydrOXYzine HCl (ATARAX) 25 MG tablet [Pharmacy Med Name: hydrOXYzine HCL 25 MG TABLET] 90 tablet 1     Sig: TAKE 1 TABLET BY MOUTH EVERY 6 HOURS AS NEEDED FOR ANXIETY        Listed PCP is Sasha Rdz MD   Last provider to prescribe medication: Sasha Rdz MD  Last Date of Medication Prescribed: 02.18.25   Date of Last Office Visit at [facility]: 12.19.24   FUTURE APPOINTMENT:   Future Appointments   Date Time Provider Department Center   5/13/2025 11:00 AM Radha Mclain APRN - NP NEUROWRSPPBB [facility]       Please send refill to:    [facility] [id number] - [place], [place] - [address] [phone] - F [phone]  [address]  [facility] [address]  Phone: [phone] Fax: [phone]      Please review request and approve or deny with recommendations.

## 2025-04-22 NOTE — TELEPHONE ENCOUNTER
Medication Refill Request    Stephanie White is requesting a refill of the following medication(s):   Requested Prescriptions     Pending Prescriptions Disp Refills    montelukast (SINGULAIR) 10 MG tablet [Pharmacy Med Name: MONTELUKAST SOD 10 MG TABLET] 30 tablet      Sig: TAKE 1 TABLET BY MOUTH DAILY        Listed PCP is Sasha Rdz MD   Last provider to prescribe medication: Kendell  Last Date of Medication Prescribed:  04/18/2024  Date of Last Office Visit at [facility]:  12/19/2024  FUTURE APPOINTMENT:   Future Appointments   Date Time Provider Department Center   5/13/2025 11:00 AM Radha Mclain APRN - NP NEUROWRSPPBB [facility]       Please send refill to:    [facility] [id number] - [place], [place] - [address] [phone] - F [phone]  [address]  [facility] [address]  Phone: [phone] Fax: [phone]      Please review request and approve or deny with recommendations.

## 2025-05-13 ENCOUNTER — OFFICE VISIT (OUTPATIENT)
Age: 35
End: 2025-05-13
Payer: COMMERCIAL

## 2025-05-13 VITALS
SYSTOLIC BLOOD PRESSURE: 160 MMHG | DIASTOLIC BLOOD PRESSURE: 84 MMHG | OXYGEN SATURATION: 97 % | RESPIRATION RATE: 20 BRPM | HEART RATE: 78 BPM

## 2025-05-13 DIAGNOSIS — G43.009 MIGRAINE WITHOUT AURA AND WITHOUT STATUS MIGRAINOSUS, NOT INTRACTABLE: Primary | ICD-10-CM

## 2025-05-13 PROCEDURE — 99213 OFFICE O/P EST LOW 20 MIN: CPT

## 2025-05-13 RX ORDER — UBROGEPANT 100 MG/1
100 TABLET ORAL PRN
Qty: 16 TABLET | Refills: 5 | Status: SHIPPED | OUTPATIENT
Start: 2025-05-13

## 2025-05-13 ASSESSMENT — ENCOUNTER SYMPTOMS
VOMITING: 1
PHOTOPHOBIA: 1
NAUSEA: 1

## 2025-05-13 NOTE — PROGRESS NOTES
NO preventative just rescue   Fiorcet and sumatriptan     Frequency- not as frequent as before   November- 1 December- 1 January- 1 February- 0 March- 2 (had an ER visit) BP was elevated and was having chest pain)   April- 1   May- nothing so far for this month     Usually lasting several hours   Uses the sumatriptan first and at the onset even then it still lasts several hours   After an hour and no relief she is usually taking something different advil cold and sinus     
(!) 160/84 (BP Site: Left Upper Arm, Patient Position: Sitting, BP Cuff Size: Medium Adult)   Pulse 78   Resp 20   SpO2 97%     General: Well defined, nourished, and groomed individual in no acute distress.    Neck: Supple, nontender, no bruits, no pain with resistance to active range of motion.    Musculoskeletal: Extremities revealed no edema and had full range of motion of joints.    Psych: Good mood and bright affect    NEUROLOGICAL EXAMINATION:    Mental Status: Alert and oriented to person, place, and time    Cranial Nerves:    II, III, IV, VI: Visual acuity grossly intact. Visual fields are normal.    Pupils are equal, round, and reactive to light and accommodation.    Extra-ocular movements are full and fluid. V-XII: Hearing is grossly intact. Facial features are symmetric, with normal sensation and strength. The palate rises symmetrically and the tongue protrudes midline.     Motor Examination: Normal tone, bulk, and strength, 5/5 muscle strength throughout.     Coordination: No resting or intention tremor    Gait and Station: Steady while walking. Normal arm swing. No pronator drift. No muscle wasting or fasiculations noted.     Orders Placed This Encounter    Ubrogepant (UBRELVY) 100 MG TABS     Sig: Take 100 mg by mouth as needed (Take 1 pill at migraine onset. May repeat dose x 1 after 2 hours if still needed.)     Dispense:  16 tablet     Refill:  5        1. Migraine without aura and without status migrainosus, not intractable      Patient will continue to monitor her migraine days and she is doing well with mainly just 1 migraine a month.  We will see if we can get her a better rescue at this time.    Start Ubrelvy 100 mg as needed for rescue therapy.  We will get a PA on this medication.  Did advise the patient to look into a co-pay card online if needed.    As long as the patient continues to do well, she may follow-up in 6 months or longer if she is doing very well at the 6-month luis.  Return

## 2025-05-28 DIAGNOSIS — K21.9 GASTRO-ESOPHAGEAL REFLUX DISEASE WITHOUT ESOPHAGITIS: ICD-10-CM

## 2025-05-28 RX ORDER — FAMOTIDINE 40 MG/1
40 TABLET, FILM COATED ORAL NIGHTLY
Qty: 90 TABLET | Refills: 1 | Status: SHIPPED | OUTPATIENT
Start: 2025-05-28

## 2025-05-28 NOTE — TELEPHONE ENCOUNTER
Medication Refill Request    Stephanie White is requesting a refill of the following medication(s):   Requested Prescriptions      No prescriptions requested or ordered in this encounter        Listed PCP is Sasha Rdz MD   Last provider to prescribe medication: Sasha Rdz MD  Last Date of Medication Prescribed: 02.12.25   Date of Last Office Visit at [facility]: 12.19.24   FUTURE APPOINTMENT:   Future Appointments   Date Time Provider Department Center   11/10/2025  1:00 PM Radha Mclain APRN - NP NEUROWRSPPBB [facility]       Please send refill to:    [facility] [id number] - [place], [place] - [address] [phone] - F [phone]  [address]  [facility] [address]  Phone: [phone] Fax: [phone]      Please review request and approve or deny with recommendations.

## 2025-08-19 DIAGNOSIS — J30.89 OTHER ALLERGIC RHINITIS: ICD-10-CM

## 2025-08-19 DIAGNOSIS — F41.9 ANXIETY: ICD-10-CM

## 2025-08-19 DIAGNOSIS — K21.9 GASTRO-ESOPHAGEAL REFLUX DISEASE WITHOUT ESOPHAGITIS: ICD-10-CM

## 2025-08-19 RX ORDER — MONTELUKAST SODIUM 10 MG/1
10 TABLET ORAL DAILY
Qty: 90 TABLET | Refills: 3 | Status: SHIPPED | OUTPATIENT
Start: 2025-08-19

## 2025-08-21 RX ORDER — FAMOTIDINE 40 MG/1
40 TABLET, FILM COATED ORAL NIGHTLY
Qty: 90 TABLET | Refills: 1 | Status: SHIPPED | OUTPATIENT
Start: 2025-08-21

## 2025-08-21 RX ORDER — HYDROXYZINE HYDROCHLORIDE 25 MG/1
25 TABLET, FILM COATED ORAL EVERY 6 HOURS PRN
Qty: 90 TABLET | Refills: 1 | Status: SHIPPED | OUTPATIENT
Start: 2025-08-21